# Patient Record
Sex: FEMALE | Race: WHITE | NOT HISPANIC OR LATINO | Employment: OTHER | URBAN - METROPOLITAN AREA
[De-identification: names, ages, dates, MRNs, and addresses within clinical notes are randomized per-mention and may not be internally consistent; named-entity substitution may affect disease eponyms.]

---

## 2017-01-01 ENCOUNTER — HOSPITAL ENCOUNTER (EMERGENCY)
Facility: HOSPITAL | Age: 57
Discharge: HOME | End: 2017-01-01
Attending: EMERGENCY MEDICINE
Payer: MEDICAID

## 2017-01-01 VITALS
DIASTOLIC BLOOD PRESSURE: 88 MMHG | BODY MASS INDEX: 38.32 KG/M2 | HEART RATE: 97 BPM | RESPIRATION RATE: 20 BRPM | HEIGHT: 65 IN | TEMPERATURE: 98 F | WEIGHT: 230 LBS | SYSTOLIC BLOOD PRESSURE: 156 MMHG | OXYGEN SATURATION: 94 %

## 2017-01-01 DIAGNOSIS — F41.9 ANXIETY: Primary | ICD-10-CM

## 2017-01-01 PROCEDURE — 96372 THER/PROPH/DIAG INJ SC/IM: CPT

## 2017-01-01 PROCEDURE — 99283 EMERGENCY DEPT VISIT LOW MDM: CPT | Mod: 25

## 2017-01-01 PROCEDURE — 63600175 PHARM REV CODE 636 W HCPCS: Performed by: EMERGENCY MEDICINE

## 2017-01-01 RX ORDER — LORAZEPAM 2 MG/ML
2 INJECTION INTRAMUSCULAR
Status: COMPLETED | OUTPATIENT
Start: 2017-01-01 | End: 2017-01-01

## 2017-01-01 RX ADMIN — LORAZEPAM 2 MG: 2 INJECTION, SOLUTION INTRAMUSCULAR; INTRAVENOUS at 03:01

## 2017-01-01 NOTE — ED AVS SNAPSHOT
[facility]  [address] [facility] [id number]               Esperanza Aquino   2017  3:00 PM   ED    Description:  Female : 1960   Department:  [facility]            Your Care was Coordinated By:     Provider Role From To    Steve Solis MD Attending Provider 17 [phone] --      Reason for Visit     Anxiety           Diagnoses this Visit        Comments    Anxiety    -  Primary       ED Disposition     ED Disposition Condition Comment    Discharge             To Do List           Follow-up Information     Follow up with PCP In 2 days.    Contact information:    [phone]      [facility]     [facility] -  Assistance  Registered nurses in the [facility] provide clinical advisement, health education, appointment booking, and other advisory services.  Call for this free service at [phone].             Medications           Message regarding Medications     Verify the changes and/or additions to your medication regime listed below are the same as discussed with your clinician today.  If any of these changes or additions are incorrect, please notify your healthcare provider.        These medications were administered today        Dose Freq    lorazepam injection 2 mg 2 mg ED 1 Time    Sig: Inject 1 mL (2 mg total) into the muscle ED 1 Time.    Class: Normal    Route: Intramuscular           Verify that the below list of medications is an accurate representation of the medications you are currently taking.  If none reported, the list may be blank. If incorrect, please contact your healthcare provider. Carry this list with you in case of emergency.           Current Medications     ferrous gluconate (FERGON) 324 MG tablet Take 1 tablet (324 mg total) by mouth 2 (two) times daily with meals.    lorazepam (ATIVAN) 1 MG tablet Take 1 mg by mouth every 6 (six) hours as needed for Anxiety.    lorazepam  "injection 2 mg Inject 1 mL (2 mg total) into the muscle ED 1 Time.    lurasidone (LATUDA) 40 mg Tab tablet Take 80 mg by mouth once daily.    pantoprazole (PROTONIX) 40 MG tablet Take 1 tablet (40 mg total) by mouth 2 (two) times daily. BID for 14 days through June 21, 2015 then once a day.    valproic acid (DEPAKENE) 250 mg capsule Take 250 mg by mouth 4 (four) times daily.           Clinical Reference Information           Your Vitals Were     BP Pulse Temp Resp Height Weight    156/88 (BP Location: Right arm, Patient Position: Sitting) 97 98.2 °F (36.8 °C) (Oral) 20 5' 5" (1.651 m) 104.3 kg (230 lb)    SpO2 BMI             94% 38.27 kg/m2         Allergies as of 1/1/2017        Reactions    Lithobid [Lithium Carbonate] Other (See Comments)    Severe depression    Sudafed [Pseudoephedrine Hcl] Other (See Comments)    Pt states she flat lined    Prednisone Other (See Comments)    Elevated BP    Prozac [Fluoxetine] Swelling    Thorazine [Chlorpromazine] Swelling      Immunizations Administered on Date of Encounter - 1/1/2017     None      ED Micro, Lab, POCT     None      ED Imaging Orders     None        Discharge Instructions         Understanding Anxiety Disorders  Almost everyone gets nervous now and then. Its normal to have knots in your stomach before a test, or for your heart to race on a first date. But an anxiety disorder is much more than a case of nerves. In fact, its symptoms may be overwhelming. But treatment can relieve many of these symptoms. Talking to your doctor is the first step.    What are anxiety disorders?  An anxiety disorder causes intense feelings of panic and fear. These feelings may arise for no apparent reason. And they tend to recur again and again. They may prevent you from coping with life and cause you great distress. As a result, you may avoid anything that triggers your fear. In extreme cases, you may never leave the house. Anxiety disorders may cause other symptoms, such " as:  · Obsessive thoughts you cant control  · Constant nightmares or painful thoughts of the past  · Nausea, sweating, and muscle tension  · Difficulty sleeping or concentrating  What causes anxiety disorders?  Anxiety disorders tend to run in families. For some people, childhood abuse or neglect may play a role. For others, stressful life events or trauma may trigger anxiety disorders. Anxiety can trigger low self-esteem and poor coping skills.  Common anxiety disorders  · Panic disorder: This causes an intense fear of being in danger.  · Phobias: These are extreme fears of certain objects, places, or events.  · Obsessive-compulsive disorder: This causes you to have unwanted thoughts. You also may perform certain actions over and over.  · Posttraumatic stress disorder: This occurs in people who have survived a terrible ordeal. It can cause nightmares and flashbacks about the event.  · Generalized anxiety disorder: This causes constant worry that can greatly disrupt your life.   Getting better  You may believe that nothing can help you. Or, you might fear what others may think. But most anxiety symptoms can be eased. Having an anxiety disorder is nothing to be ashamed of. Most people do best with treatment that combines medication and therapy. Although these arent cures, they can help you live a healthier life.  © 5419-6451 [employer]. [address] [place] [address], [place] [address]. All rights reserved. This information is not intended as a substitute for professional medical care. Always follow your healthcare professional's instructions.          [facility] Sign-Up     Activating your [facility] account is as easy as 1-2-3!     1) Visit [email or web address], select Sign Up Now, enter this activation code and your date of birth, then select Next.  [email or web address]  Expires: 2/15/2017  3:05 PM      2) Create a username and password to use when you visit [facility] in the future and select a security question in  case you lose your password and select Next.    3) Enter your e-mail address and click Sign Up!    Additional Information  If you have questions, please e-mail [email or web address] or call [phone] to talk to our [facility] staff. Remember, [facility] is NOT to be used for urgent needs. For medical emergencies, dial 911.         Smoking Cessation     If you would like to quit smoking:   You may be eligible for free services if you are a [place] resident and started smoking cigarettes before September 1, 1988.  Call the Smoking Cessation Trust (SCT) toll free at (376) 216-0251 or (481) 821-2012.   Call 0-907-QUIT-NOW if you do not meet the above criteria.             [facility] complies with applicable Federal civil rights laws and does not discriminate on the basis of race, color, national origin, age, disability, or sex.        Language Assistance Services     ATTENTION: Language assistance services are available, free of charge. Please call [phone].      ATENCIÓN: Si habla español, tiene a bobo disposición servicios gratuitos de asistencia lingüística. Llame al [phone].     CHÚ Ý: N?u b?n nói Ti?ng Vi?t, có các d?ch v? h? tr? ngôn ng? mi?n phí dành cho b?n. G?i s? [phone].

## 2017-01-01 NOTE — DISCHARGE INSTRUCTIONS
Understanding Anxiety Disorders  Almost everyone gets nervous now and then. Its normal to have knots in your stomach before a test, or for your heart to race on a first date. But an anxiety disorder is much more than a case of nerves. In fact, its symptoms may be overwhelming. But treatment can relieve many of these symptoms. Talking to your doctor is the first step.    What are anxiety disorders?  An anxiety disorder causes intense feelings of panic and fear. These feelings may arise for no apparent reason. And they tend to recur again and again. They may prevent you from coping with life and cause you great distress. As a result, you may avoid anything that triggers your fear. In extreme cases, you may never leave the house. Anxiety disorders may cause other symptoms, such as:  · Obsessive thoughts you cant control  · Constant nightmares or painful thoughts of the past  · Nausea, sweating, and muscle tension  · Difficulty sleeping or concentrating  What causes anxiety disorders?  Anxiety disorders tend to run in families. For some people, childhood abuse or neglect may play a role. For others, stressful life events or trauma may trigger anxiety disorders. Anxiety can trigger low self-esteem and poor coping skills.  Common anxiety disorders  · Panic disorder: This causes an intense fear of being in danger.  · Phobias: These are extreme fears of certain objects, places, or events.  · Obsessive-compulsive disorder: This causes you to have unwanted thoughts. You also may perform certain actions over and over.  · Posttraumatic stress disorder: This occurs in people who have survived a terrible ordeal. It can cause nightmares and flashbacks about the event.  · Generalized anxiety disorder: This causes constant worry that can greatly disrupt your life.   Getting better  You may believe that nothing can help you. Or, you might fear what others may think. But most anxiety symptoms can be eased. Having an anxiety  disorder is nothing to be ashamed of. Most people do best with treatment that combines medication and therapy. Although these arent cures, they can help you live a healthier life.  © 4813-3899 [employer]. [address] [place] [address], [place] [address]. All rights reserved. This information is not intended as a substitute for professional medical care. Always follow your healthcare professional's instructions.

## 2017-01-01 NOTE — ED PROVIDER NOTES
"SCRIBE #1 NOTE: I, Manjula Paul, am scribing for, and in the presence of, Steve Solis MD. I have scribed the entire note.      History      Chief Complaint   Patient presents with    Anxiety     Pt states, "I am having bad anxiety".       Review of patient's allergies indicates:   Allergen Reactions    Lithobid [lithium carbonate] Other (See Comments)     Severe depression    Sudafed [pseudoephedrine hcl] Other (See Comments)     Pt states she flat lined    Prednisone Other (See Comments)     Elevated BP    Prozac [fluoxetine] Swelling    Thorazine [chlorpromazine] Swelling        HPI   HPI    2017, 3:00 PM   History obtained from the patient      History of Present Illness: Esperanza Aquino is a 56 y.o. female patient with PMHx of anxiety who presents to the Emergency Department for anxiety exacerbation which onset gradually 2 days ago. Pt reports that she is prescribed Ativan 1 mg for anxiety but that it is not helping. She reports that she cannot stop crying and talking when she is nervous and requests something else for anxiety. Symptoms are constant and mild in severity. No modifying factors reported. Patient is currently crying. Patient denies SI, HI, self-injury, CP, SOB, HA, and all other sxs at this time. No further complaints or concerns at this time.       Arrival mode: Personal vehicle    PCP: Primary Doctor No       Past Medical History:  Past Medical History   Diagnosis Date    Bipolar 1 disorder      not sure what type    Cancer      Uterine Cancer    COPD (chronic obstructive pulmonary disease)     Depression     Encounter for blood transfusion     Hypertension     Personal history of peptic ulcer disease        Past Surgical History:  Past Surgical History   Procedure Laterality Date    Cholecystectomy      Hernia repair       section      Hysterectomy      Appendectomy           Family History:  Family History   Problem Relation Age of Onset    Lung cancer Father "     Brain cancer Brother        Social History:  Social History     Social History Main Topics    Smoking status: Former Smoker     Packs/day: 1.00     Years: 25.00     Types: Cigarettes     Quit date: 1/7/2015    Smokeless tobacco: Never Used      Comment: Patient now using E-Cigarrettes    Alcohol use No    Drug use: No    Sexual activity: Not Currently       ROS   Review of Systems   Constitutional: Negative for fever.   HENT: Negative for sore throat.    Respiratory: Negative for shortness of breath.    Cardiovascular: Negative for chest pain.   Gastrointestinal: Negative for nausea.   Genitourinary: Negative for dysuria.   Musculoskeletal: Negative for back pain.   Skin: Negative for rash.   Neurological: Negative for weakness and headaches.   Hematological: Does not bruise/bleed easily.   Psychiatric/Behavioral: Negative for self-injury and suicidal ideas. The patient is nervous/anxious.         (-) HI   All other systems reviewed and are negative.      Physical Exam    Initial Vitals   BP Pulse Resp Temp SpO2   01/01/17 1458 01/01/17 1458 01/01/17 1458 01/01/17 1458 01/01/17 1458   156/88 97 20 98.2 °F (36.8 °C) 94 %      Physical Exam  Nursing Notes and Vital Signs Reviewed.  Constitutional: Patient is in no acute distress. Awake and alert. Well-developed and well-nourished.  Head: Atraumatic. Normocephalic.  Eyes: PERRL. EOM intact. Conjunctivae are not pale. No scleral icterus.  ENT: Mucous membranes are moist. Oropharynx is clear and symmetric.    Neck: Supple. Full ROM. .  Cardiovascular: Regular rate. Regular rhythm. No murmurs, rubs, or gallops. Distal pulses are 2+ and symmetric.  Pulmonary/Chest: No respiratory distress. Clear to auscultation bilaterally. No wheezing, rales, or rhonchi.  Abdominal: Soft and non-distended.  There is no tenderness.    Genitourinary: No CVA tenderness  Musculoskeletal: Moves all extremities. No obvious deformities. No edema.   Skin: Warm and dry.  Neurological:   "Alert, awake, and appropriate.  Normal speech.  No acute focal neurological deficits are appreciated.  Psychiatric: Anxious affect with pressure speech. Denies HI and SI. Good eye contact.     ED Course    Procedures  ED Vital Signs:  Vitals:    01/01/17 1458   BP: (!) 156/88   Pulse: 97   Resp: 20   Temp: 98.2 °F (36.8 °C)   TempSrc: Oral   SpO2: (!) 94%   Weight: 104.3 kg (230 lb)   Height: 5' 5" (1.651 m)     The Emergency Provider reviewed the vital signs and test results, which are outlined above.    ED Discussion     3:00 PM: Reassessed pt at this time. Discussed pt dx and plan of tx. Informed pt to follow up with PCP. All questions and concerns were addressed at this time. Pt expresses understanding of information and instructions, and is comfortable with plan to discharge. Pt is stable for discharge.    I discussed with patient that evaluation in the ED does not suggest any emergent or life threatening medical conditions requiring immediate intervention beyond what was provided in the ED, and I believe patient is safe for discharge.  Regardless, an unremarkable evaluation in the ED does not preclude the development or presence of a serious of life threatening condition. As such, patient was instructed to return immediately for any worsening or change in current symptoms.    Pre-hypertension/Hypertension: The pt has been informed that they may have pre-hypertension or hypertension based on a blood pressure reading in the ED. I recommend that the pt call the PCP listed on their discharge instructions or a physician of their choice this week to arrange f/u for further evaluation of possible pre-hypertension or hypertension.     ED Medication(s):  Medications   lorazepam injection 2 mg (2 mg Intramuscular Given 1/1/17 7952)       Discharge Medication List as of 1/1/2017  3:05 PM          Follow-up Information     Follow up with PCP In 2 days.    Contact information:    [phone]           Medical Decision Making  "             Scribe Attestation:   Scribe #1: I performed the above scribed service and the documentation accurately describes the services I performed. I attest to the accuracy of the note.    Attending:   Physician Attestation Statement for Scribe #1: I, Steve Solis MD, personally performed the services described in this documentation, as scribed by Manjula Miller, in my presence, and it is both accurate and complete.          Clinical Impression       ICD-10-CM ICD-9-CM   1. Anxiety F41.9 300.00       Disposition:   Disposition: Discharged  Condition: Stable         Steve Solis MD  01/01/17 153

## 2017-05-30 ENCOUNTER — HOSPITAL ENCOUNTER (EMERGENCY)
Facility: HOSPITAL | Age: 57
Discharge: HOME | End: 2017-05-30
Attending: EMERGENCY MEDICINE
Payer: MEDICAID

## 2017-05-30 VITALS
HEART RATE: 91 BPM | DIASTOLIC BLOOD PRESSURE: 72 MMHG | OXYGEN SATURATION: 94 % | RESPIRATION RATE: 18 BRPM | SYSTOLIC BLOOD PRESSURE: 154 MMHG | BODY MASS INDEX: 39.82 KG/M2 | TEMPERATURE: 98 F | HEIGHT: 65 IN | WEIGHT: 239 LBS

## 2017-05-30 DIAGNOSIS — M47.817 DJD (DEGENERATIVE JOINT DISEASE), LUMBOSACRAL: ICD-10-CM

## 2017-05-30 DIAGNOSIS — S39.012A LUMBAR STRAIN, INITIAL ENCOUNTER: Primary | ICD-10-CM

## 2017-05-30 PROCEDURE — 25000003 PHARM REV CODE 250: Performed by: EMERGENCY MEDICINE

## 2017-05-30 PROCEDURE — 99283 EMERGENCY DEPT VISIT LOW MDM: CPT

## 2017-05-30 RX ORDER — TRAMADOL HYDROCHLORIDE 50 MG/1
50 TABLET ORAL EVERY 6 HOURS PRN
Qty: 15 TABLET | Refills: 0 | Status: SHIPPED | OUTPATIENT
Start: 2017-05-30 | End: 2017-06-09

## 2017-05-30 RX ORDER — CYCLOBENZAPRINE HCL 5 MG
5 TABLET ORAL 3 TIMES DAILY PRN
Qty: 20 TABLET | Refills: 0 | Status: SHIPPED | OUTPATIENT
Start: 2017-05-30 | End: 2017-06-04

## 2017-05-30 RX ORDER — DIAZEPAM 5 MG/1
5 TABLET ORAL
Status: COMPLETED | OUTPATIENT
Start: 2017-05-30 | End: 2017-05-30

## 2017-05-30 RX ORDER — HYDROCODONE BITARTRATE AND ACETAMINOPHEN 5; 325 MG/1; MG/1
1 TABLET ORAL
Status: COMPLETED | OUTPATIENT
Start: 2017-05-30 | End: 2017-05-30

## 2017-05-30 RX ADMIN — DIAZEPAM 5 MG: 5 TABLET ORAL at 06:05

## 2017-05-30 RX ADMIN — HYDROCODONE BITARTRATE AND ACETAMINOPHEN 1 TABLET: 5; 325 TABLET ORAL at 06:05

## 2017-05-30 NOTE — ED PROVIDER NOTES
SCRIBE #1 NOTE: I, Arelydenice Sarabia, am scribing for, and in the presence of, John Dobson MD. I have scribed the entire note.      History      Chief Complaint   Patient presents with    Back Pain     injured back friday at work       Review of patient's allergies indicates:   Allergen Reactions    Lithobid [lithium carbonate] Other (See Comments)     Severe depression    Sudafed [pseudoephedrine hcl] Other (See Comments)     Pt states she flat lined    Prednisone Other (See Comments)     Elevated BP    Prozac [fluoxetine] Swelling    Thorazine [chlorpromazine] Swelling        HPI   HPI    2017, 5:45 PM   History obtained from the patient      History of Present Illness: Esperanza Aquino is a 56 y.o. female patient who presents to the Emergency Department for lower back pain which onset suddenly today after injuring her back at work when she sat on something that hit her back .  Symptoms are constant and moderate in severity. No mitigating or exacerbating factors reported. No associated sxs reported. Patient denies any abd pain, CP, SOB, fever, chills, n/v/d, HA, LOC, head injury/trauma, neck pain, knee pain, hip pain, and all other sxs at this time. Prior Tx includes nothing. No further complaints or concerns at this time.         Arrival mode: Personal vehicle     PCP: Primary Doctor No       Past Medical History:  Past Medical History:   Diagnosis Date    Bipolar 1 disorder     not sure what type    Cancer     Uterine Cancer    COPD (chronic obstructive pulmonary disease)     Depression     Encounter for blood transfusion     Hypertension     Personal history of peptic ulcer disease        Past Surgical History:  Past Surgical History:   Procedure Laterality Date    APPENDECTOMY       SECTION      CHOLECYSTECTOMY      HERNIA REPAIR      HYSTERECTOMY           Family History:  Family History   Problem Relation Age of Onset    Lung cancer Father     Brain cancer Brother        Social  History:  Social History     Social History Main Topics    Smoking status: Former Smoker     Packs/day: 1.00     Years: 25.00     Types: Cigarettes     Quit date: 1/7/2015    Smokeless tobacco: Never Used      Comment: Patient now using E-Cigarrettes    Alcohol use No    Drug use: No    Sexual activity: Not Currently       ROS   Review of Systems   Constitutional: Negative for chills and fever.   HENT: Negative for sore throat.    Respiratory: Negative for shortness of breath.    Cardiovascular: Negative for chest pain.   Gastrointestinal: Negative for diarrhea, nausea and vomiting.   Genitourinary: Negative for dysuria.   Musculoskeletal: Positive for back pain (lower). Negative for neck pain.        (-) hip pain  (-) knee pain     Skin: Negative for rash.   Neurological: Negative for dizziness, syncope, weakness and headaches.   Hematological: Does not bruise/bleed easily.   All other systems reviewed and are negative.      Physical Exam      Initial Vitals [05/30/17 1601]   BP Pulse Resp Temp SpO2   (!) 169/88 96 18 97.5 °F (36.4 °C) (!) 93 %      Physical Exam  Nursing Notes and Vital Signs Reviewed.  Constitutional: Patient is in no apparent distress. Well-developed and well-nourished.  Head: Atraumatic. Normocephalic.  Eyes: PERRL. EOM intact. Conjunctivae are not pale. No scleral icterus.  ENT: Mucous membranes are moist. Oropharynx is clear and symmetric.    Neck: Supple. Full ROM. No lymphadenopathy.  Cardiovascular: Regular rate. Regular rhythm. No murmurs, rubs, or gallops. Distal pulses are 2+ and symmetric.  Pulmonary/Chest: No respiratory distress. Clear to auscultation bilaterally. No wheezing, rales, or rhonchi.  Abdominal: Soft and non-distended.  There is no tenderness.  No rebound, guarding, or rigidity. Good bowel sounds.  Musculoskeletal: Moves all extremities. No obvious deformities. No edema. No calf tenderness.  Neck: Supple.  No cervical midline bony tenderness, deformities, or  "step-offs.   Back: Tenderness to lower back with extrnsion of knee on R and against resistance. Slightly decreased ROM of lumbar spine. Skin appears normal without abrasions or bruising. No erythema, induration, or fluctuance.   Skin: Warm and dry.  Neurological:  Alert, awake, and appropriate.  Normal speech.  No acute focal neurological deficits are appreciated.  Psychiatric: Normal affect. Good eye contact. Appropriate in content.          ED Course    Procedures  ED Vital Signs:  Vitals:    05/30/17 1601   BP: (!) 169/88   Pulse: 96   Resp: 18   Temp: 97.5 °F (36.4 °C)   TempSrc: Oral   SpO2: (!) 93%   Weight: 108.4 kg (239 lb)   Height: 5' 5" (1.651 m)       Abnormal Lab Results:  Labs Reviewed - No data to display     All Lab Results:      Imaging Results:  Imaging Results          X-Ray Lumbar Spine Ap And Lateral (Final result)  Result time 05/30/17 19:18:30    Final result by Jet Solorio MD (05/30/17 19:18:30)                 Impression:         No acute fracture or subluxation.  Multilevel spondylosis.       Electronically signed by: JET SOLORIO MD  Date:     05/30/17  Time:    19:18              Narrative:    Exam: XR LUMBAR SPINE AP AND LATERAL    Clinical History:   Acute low back pain.  Initial encounter.      Findings:      The vertebral body heights and alignment are within normal limits. No acute fracture or subluxation.Moderate degenerative disc disease at L1-L2 and L4-L5. Moderate bilateral facet DJD L4-L5 L5-S1.                                      The Emergency Provider reviewed the vital signs and test results, which are outlined above.    ED Discussion      7:15 PM patient's pain is improved after medications.  X-rays do not reveal any evidence of an acute fracture.  There is moderate degenerative disc disease at L1-L2 and L4-L5.  There is also moderate bilateral facet degenerative joint disease L4-5 L5 S1    ED Medication(s):  Medications   diazePAM tablet 5 mg (5 mg Oral Given 5/30/17 " 1808)   hydrocodone-acetaminophen 5-325mg per tablet 1 tablet (1 tablet Oral Given 5/30/17 1808)       New Prescriptions    CYCLOBENZAPRINE (FLEXERIL) 5 MG TABLET    Take 1 tablet (5 mg total) by mouth 3 (three) times daily as needed for Muscle spasms.    TRAMADOL (ULTRAM) 50 MG TABLET    Take 1 tablet (50 mg total) by mouth every 6 (six) hours as needed for Pain.             Medical Decision Making              Scribe Attestation:   Scribe #1: I performed the above scribed service and the documentation accurately describes the services I performed. I attest to the accuracy of the note.    Attending:   Physician Attestation Statement for Scribe #1: I, John Dobson MD, personally performed the services described in this documentation, as scribed by Arely Sarabia, in my presence, and it is both accurate and complete.          Clinical Impression       ICD-10-CM ICD-9-CM   1. Lumbar strain, initial encounter S39.012A 847.2   2. DJD (degenerative joint disease), lumbosacral M51.37 722.52       Disposition:   Disposition: Discharged  Condition: Stable         John Dobson MD  05/30/17 1934

## 2017-11-13 ENCOUNTER — HOSPITAL ENCOUNTER (EMERGENCY)
Facility: HOSPITAL | Age: 57
Discharge: HOME | End: 2017-11-13
Payer: MEDICAID

## 2017-11-13 VITALS
OXYGEN SATURATION: 95 % | SYSTOLIC BLOOD PRESSURE: 169 MMHG | TEMPERATURE: 98 F | WEIGHT: 243 LBS | HEART RATE: 84 BPM | DIASTOLIC BLOOD PRESSURE: 75 MMHG | HEIGHT: 65 IN | BODY MASS INDEX: 40.48 KG/M2 | RESPIRATION RATE: 20 BRPM

## 2017-11-13 DIAGNOSIS — M25.569 KNEE PAIN: Primary | ICD-10-CM

## 2017-11-13 DIAGNOSIS — G89.29 OTHER CHRONIC PAIN: ICD-10-CM

## 2017-11-13 PROCEDURE — 25000003 PHARM REV CODE 250: Performed by: REGISTERED NURSE

## 2017-11-13 PROCEDURE — 99283 EMERGENCY DEPT VISIT LOW MDM: CPT

## 2017-11-13 PROCEDURE — 29505 APPLICATION LONG LEG SPLINT: CPT | Mod: LT

## 2017-11-13 RX ORDER — OXYCODONE AND ACETAMINOPHEN 10; 325 MG/1; MG/1
1 TABLET ORAL
Status: COMPLETED | OUTPATIENT
Start: 2017-11-13 | End: 2017-11-13

## 2017-11-13 RX ADMIN — OXYCODONE HYDROCHLORIDE AND ACETAMINOPHEN 1 TABLET: 10; 325 TABLET ORAL at 12:11

## 2017-11-13 NOTE — ED PROVIDER NOTES
Encounter Date: 2017       History     Chief Complaint   Patient presents with    Knee Pain     pt states her left knee pain     Pt presents to ED with L knee pain which onset gradually 2-3 days ago after getting out of a chair. Pt has a hx of arthritis and bone spurs to both knees. Pt states that she was getting injections in both knees but hasn't had one since . Pt states that she needs something for the pain. Symptoms are constant and moderate in severity. No mitigating or exacerbating factors reported. Associated sxs include knee pain and difficulty walking. Patient denies any fever, CP or SOB, and all other sxs at this time. Prior Tx includes tylenol 3 with minimal effect. No further complaints or concerns at this time.             Review of patient's allergies indicates:   Allergen Reactions    Lithobid [lithium carbonate] Other (See Comments)     Severe depression    Sudafed [pseudoephedrine hcl] Other (See Comments)     Pt states she flat lined    Prednisone Other (See Comments)     Elevated BP    Prozac [fluoxetine] Swelling    Thorazine [chlorpromazine] Swelling     Past Medical History:   Diagnosis Date    Bipolar 1 disorder     not sure what type    Cancer     Uterine Cancer    COPD (chronic obstructive pulmonary disease)     Depression     Encounter for blood transfusion     Hypertension     Personal history of peptic ulcer disease      Past Surgical History:   Procedure Laterality Date    APPENDECTOMY       SECTION      CHOLECYSTECTOMY      HERNIA REPAIR      HYSTERECTOMY       Family History   Problem Relation Age of Onset    Lung cancer Father     Brain cancer Brother      Social History   Substance Use Topics    Smoking status: Former Smoker     Packs/day: 1.00     Years: 25.00     Types: Cigarettes     Quit date: 2015    Smokeless tobacco: Never Used      Comment: Patient now using E-Cigarrettes    Alcohol use No     Review of Systems   Constitutional:  Negative for fever.   HENT: Negative for sore throat.    Respiratory: Negative for shortness of breath.    Cardiovascular: Negative for chest pain.   Gastrointestinal: Negative for nausea.   Genitourinary: Negative for dysuria.   Musculoskeletal: Positive for arthralgias and gait problem. Negative for back pain.   Skin: Negative for rash.   Neurological: Negative for weakness.   Hematological: Does not bruise/bleed easily.   All other systems reviewed and are negative.      Physical Exam     Initial Vitals [11/13/17 1206]   BP Pulse Resp Temp SpO2   (!) 170/84 98 20 98.6 °F (37 °C) (!) 93 %      MAP       112.67         Physical Exam  Vital signs and nursing notes reviewed.  Constitutional: Patient is in no acute distress. Awake and alert. Well-developed and well-nourished.  Head: Atraumatic. Normocephalic.  Eyes: PERRL. EOM intact. Conjunctivae nl. No scleral icterus.  ENT: Mucous membranes are moist. Oropharynx is clear.  Neck: Supple. Full ROM. No lymphadenopathy.  Cardiovascular: Regular rate and rhythm. No murmurs, rubs, or gallops. Distal pulses are 2+ and symmetric.  Pulmonary/Chest: No respiratory distress. Clear to auscultation bilaterally. No wheezing, rales, or rhonchi.  Abdominal: Soft. Non-distended. No TTP. No rebound, guarding, or rigidity. Good bowel sounds.  Genitourinary: No CVA tenderness  Musculoskeletal: Moves all extremities. No edema. No calf tenderness.  LEFT Knee:  No obvious deformity. There is mild swelling to lateral aspect.  There is point tenderness to lateral and posterior knee.  No increased warmth, erythema, induration or fluctuance.  No ligament laxity. DP and PT pulses are 2+.  Normal capillary refill.  Distal sensation is intact.  Skin: Warm and dry.  Neurological: Awake and alert. No acute focal neurological deficits are appreciated.  Psychiatric: Normal affect. Good eye contact. Appropriate in content.    ED Course   Procedures  Labs Reviewed - No data to display            Imaging Results          X-Ray Knee Complete 4 or more Views Left (Final result)  Result time 11/13/17 12:45:19   Procedure changed from X-Ray Knee 3 View Left     Final result by Bryce Lopez MD (11/13/17 12:45:19)                 Impression:         No acute fracture of the left knee.        Electronically signed by: BRYCE LOPEZ MD  Date:     11/13/17  Time:    12:45              Narrative:    Exam: XR KNEE COMP 4 OR MORE VIEWS LEFT    Clinical History:    Pain.  Left knee injury.    Findings:      Small osteophytes in the medial and lateral compartment.No fracture or dislocation or joint effusion.                                                ED Course    1:20 PM: Reassessed pt at this time.  Pt states her condition has improved at this time. Discussed with pt all pertinent ED information and results. Discussed pt dx of knee pain and plan of tx. Gave pt all f/u and return to the ED instructions. All questions and concerns were addressed at this time. Pt expresses understanding of information and instructions, and is comfortable with plan to discharge. Pt is stable for discharge.      Clinical Impression:   The primary encounter diagnosis was Knee pain. A diagnosis of Other chronic pain was also pertinent to this visit.                           Randy Salmeron Jr., [facility]  11/13/17 1532       Randy Salmeron Jr., [facility]  11/13/17 1532

## 2018-01-24 ENCOUNTER — HOSPITAL ENCOUNTER (INPATIENT)
Facility: HOSPITAL | Age: 58
LOS: 3 days | Discharge: HOME HEALTH | DRG: 871 | End: 2018-01-27
Attending: EMERGENCY MEDICINE | Admitting: INTERNAL MEDICINE
Payer: MEDICAID

## 2018-01-24 DIAGNOSIS — E87.6 HYPOKALEMIA: ICD-10-CM

## 2018-01-24 DIAGNOSIS — J18.9 PNEUMONIA DUE TO INFECTIOUS ORGANISM, UNSPECIFIED LATERALITY, UNSPECIFIED PART OF LUNG: ICD-10-CM

## 2018-01-24 DIAGNOSIS — J18.9 PNEUMONIA OF RIGHT LOWER LOBE DUE TO INFECTIOUS ORGANISM: Primary | ICD-10-CM

## 2018-01-24 DIAGNOSIS — J96.01 ACUTE RESPIRATORY FAILURE WITH HYPOXIA: ICD-10-CM

## 2018-01-24 DIAGNOSIS — R07.9 CHEST PAIN: ICD-10-CM

## 2018-01-24 DIAGNOSIS — J44.0 CHRONIC OBSTRUCTIVE PULMONARY DISEASE WITH ACUTE LOWER RESPIRATORY INFECTION: ICD-10-CM

## 2018-01-24 DIAGNOSIS — J44.1 CHRONIC OBSTRUCTIVE PULMONARY DISEASE WITH ACUTE EXACERBATION: ICD-10-CM

## 2018-01-24 PROBLEM — E78.5 HYPERLIPIDEMIA: Status: ACTIVE | Noted: 2018-01-24

## 2018-01-24 PROBLEM — I10 ESSENTIAL HYPERTENSION: Status: ACTIVE | Noted: 2018-01-24

## 2018-01-24 PROBLEM — F31.61 BIPOLAR DISORDER, CURRENT EPISODE MIXED, MILD: Status: ACTIVE | Noted: 2018-01-24

## 2018-01-24 PROBLEM — E03.9 HYPOTHYROID: Status: ACTIVE | Noted: 2018-01-24

## 2018-01-24 PROBLEM — Z72.0 TOBACCO ABUSE: Status: ACTIVE | Noted: 2018-01-24

## 2018-01-24 PROBLEM — J96.21 ACUTE ON CHRONIC RESPIRATORY FAILURE WITH HYPOXIA: Status: ACTIVE | Noted: 2018-01-24

## 2018-01-24 LAB
ALBUMIN SERPL BCP-MCNC: 3.4 G/DL
ALLENS TEST: ABNORMAL
ALP SERPL-CCNC: 69 U/L
ALT SERPL W/O P-5'-P-CCNC: 9 U/L
ANION GAP SERPL CALC-SCNC: 15 MMOL/L
AST SERPL-CCNC: 12 U/L
BASOPHILS # BLD AUTO: 0.03 K/UL
BASOPHILS NFR BLD: 0.3 %
BILIRUB SERPL-MCNC: 0.3 MG/DL
BNP SERPL-MCNC: 41 PG/ML
BUN SERPL-MCNC: 8 MG/DL
CALCIUM SERPL-MCNC: 9.5 MG/DL
CHLORIDE SERPL-SCNC: 90 MMOL/L
CO2 SERPL-SCNC: 32 MMOL/L
CREAT SERPL-MCNC: 0.8 MG/DL
DELSYS: ABNORMAL
DIFFERENTIAL METHOD: ABNORMAL
EOSINOPHIL # BLD AUTO: 0.1 K/UL
EOSINOPHIL NFR BLD: 1 %
ERYTHROCYTE [DISTWIDTH] IN BLOOD BY AUTOMATED COUNT: 14.2 %
EST. GFR  (AFRICAN AMERICAN): >60 ML/MIN/1.73 M^2
EST. GFR  (NON AFRICAN AMERICAN): >60 ML/MIN/1.73 M^2
GLUCOSE SERPL-MCNC: 162 MG/DL
HCO3 UR-SCNC: 33.8 MMOL/L (ref 24–28)
HCT VFR BLD AUTO: 44.5 %
HGB BLD-MCNC: 14.9 G/DL
LACTATE SERPL-SCNC: 2.9 MMOL/L
LYMPHOCYTES # BLD AUTO: 3.3 K/UL
LYMPHOCYTES NFR BLD: 31.4 %
MAGNESIUM SERPL-MCNC: 1.4 MG/DL
MCH RBC QN AUTO: 30.4 PG
MCHC RBC AUTO-ENTMCNC: 33.5 G/DL
MCV RBC AUTO: 91 FL
MONOCYTES # BLD AUTO: 0.6 K/UL
MONOCYTES NFR BLD: 5.8 %
NEUTROPHILS # BLD AUTO: 6.4 K/UL
NEUTROPHILS NFR BLD: 61.5 %
PCO2 BLDA: 48.5 MMHG (ref 35–45)
PH SMN: 7.45 [PH] (ref 7.35–7.45)
PLATELET # BLD AUTO: 361 K/UL
PMV BLD AUTO: 9 FL
PO2 BLDA: 50 MMHG (ref 80–100)
POC BE: 10 MMOL/L
POC SATURATED O2: 86 % (ref 95–100)
POTASSIUM SERPL-SCNC: 2.7 MMOL/L
PROT SERPL-MCNC: 8 G/DL
RBC # BLD AUTO: 4.9 M/UL
SAMPLE: ABNORMAL
SITE: ABNORMAL
SODIUM SERPL-SCNC: 137 MMOL/L
TROPONIN I SERPL DL<=0.01 NG/ML-MCNC: <0.006 NG/ML
WBC # BLD AUTO: 10.45 K/UL

## 2018-01-24 PROCEDURE — 96375 TX/PRO/DX INJ NEW DRUG ADDON: CPT

## 2018-01-24 PROCEDURE — 82803 BLOOD GASES ANY COMBINATION: CPT

## 2018-01-24 PROCEDURE — 36415 COLL VENOUS BLD VENIPUNCTURE: CPT

## 2018-01-24 PROCEDURE — 99285 EMERGENCY DEPT VISIT HI MDM: CPT | Mod: 25

## 2018-01-24 PROCEDURE — 99900035 HC TECH TIME PER 15 MIN (STAT)

## 2018-01-24 PROCEDURE — 93010 ELECTROCARDIOGRAM REPORT: CPT | Mod: ,,, | Performed by: INTERNAL MEDICINE

## 2018-01-24 PROCEDURE — 83605 ASSAY OF LACTIC ACID: CPT

## 2018-01-24 PROCEDURE — 63600175 PHARM REV CODE 636 W HCPCS: Performed by: EMERGENCY MEDICINE

## 2018-01-24 PROCEDURE — 25000242 PHARM REV CODE 250 ALT 637 W/ HCPCS: Performed by: EMERGENCY MEDICINE

## 2018-01-24 PROCEDURE — 21400001 HC TELEMETRY ROOM

## 2018-01-24 PROCEDURE — 84484 ASSAY OF TROPONIN QUANT: CPT

## 2018-01-24 PROCEDURE — 83735 ASSAY OF MAGNESIUM: CPT

## 2018-01-24 PROCEDURE — 93005 ELECTROCARDIOGRAM TRACING: CPT

## 2018-01-24 PROCEDURE — 94760 N-INVAS EAR/PLS OXIMETRY 1: CPT

## 2018-01-24 PROCEDURE — 83880 ASSAY OF NATRIURETIC PEPTIDE: CPT

## 2018-01-24 PROCEDURE — 85025 COMPLETE CBC W/AUTO DIFF WBC: CPT

## 2018-01-24 PROCEDURE — 94640 AIRWAY INHALATION TREATMENT: CPT

## 2018-01-24 PROCEDURE — 80053 COMPREHEN METABOLIC PANEL: CPT

## 2018-01-24 PROCEDURE — 63600175 PHARM REV CODE 636 W HCPCS: Performed by: NURSE PRACTITIONER

## 2018-01-24 PROCEDURE — 87040 BLOOD CULTURE FOR BACTERIA: CPT

## 2018-01-24 PROCEDURE — 96361 HYDRATE IV INFUSION ADD-ON: CPT

## 2018-01-24 PROCEDURE — 25000003 PHARM REV CODE 250: Performed by: EMERGENCY MEDICINE

## 2018-01-24 PROCEDURE — 25000003 PHARM REV CODE 250: Performed by: NURSE PRACTITIONER

## 2018-01-24 PROCEDURE — 36600 WITHDRAWAL OF ARTERIAL BLOOD: CPT

## 2018-01-24 PROCEDURE — 96365 THER/PROPH/DIAG IV INF INIT: CPT

## 2018-01-24 RX ORDER — GABAPENTIN 300 MG/1
300 CAPSULE ORAL 3 TIMES DAILY
COMMUNITY
End: 2019-04-02

## 2018-01-24 RX ORDER — IPRATROPIUM BROMIDE AND ALBUTEROL SULFATE 2.5; .5 MG/3ML; MG/3ML
3 SOLUTION RESPIRATORY (INHALATION)
Status: COMPLETED | OUTPATIENT
Start: 2018-01-24 | End: 2018-01-24

## 2018-01-24 RX ORDER — FLUTICASONE PROPIONATE 50 MCG
1 SPRAY, SUSPENSION (ML) NASAL DAILY
COMMUNITY
End: 2019-04-02 | Stop reason: SDUPTHER

## 2018-01-24 RX ORDER — IPRATROPIUM BROMIDE AND ALBUTEROL SULFATE 2.5; .5 MG/3ML; MG/3ML
3 SOLUTION RESPIRATORY (INHALATION) EVERY 4 HOURS
Status: DISCONTINUED | OUTPATIENT
Start: 2018-01-25 | End: 2018-01-27 | Stop reason: HOSPADM

## 2018-01-24 RX ORDER — ATORVASTATIN CALCIUM 20 MG/1
20 TABLET, FILM COATED ORAL DAILY
COMMUNITY
End: 2019-04-02 | Stop reason: SDUPTHER

## 2018-01-24 RX ORDER — LEVOTHYROXINE SODIUM 112 UG/1
112 TABLET ORAL DAILY
Status: DISCONTINUED | OUTPATIENT
Start: 2018-01-25 | End: 2018-01-27 | Stop reason: HOSPADM

## 2018-01-24 RX ORDER — LEVOTHYROXINE SODIUM 112 UG/1
112 TABLET ORAL DAILY
COMMUNITY
End: 2019-04-02 | Stop reason: SDUPTHER

## 2018-01-24 RX ORDER — BUPROPION HYDROCHLORIDE 150 MG/1
300 TABLET ORAL DAILY
Status: DISCONTINUED | OUTPATIENT
Start: 2018-01-25 | End: 2018-01-27 | Stop reason: HOSPADM

## 2018-01-24 RX ORDER — POLYETHYLENE GLYCOL 3350 17 G/17G
17 POWDER, FOR SOLUTION ORAL DAILY
Status: DISCONTINUED | OUTPATIENT
Start: 2018-01-25 | End: 2018-01-27 | Stop reason: HOSPADM

## 2018-01-24 RX ORDER — LORAZEPAM 1 MG/1
1 TABLET ORAL EVERY 6 HOURS PRN
Status: DISCONTINUED | OUTPATIENT
Start: 2018-01-24 | End: 2018-01-27 | Stop reason: HOSPADM

## 2018-01-24 RX ORDER — PROMETHAZINE HYDROCHLORIDE 25 MG/1
25 TABLET ORAL EVERY 6 HOURS PRN
Status: DISCONTINUED | OUTPATIENT
Start: 2018-01-24 | End: 2018-01-27 | Stop reason: HOSPADM

## 2018-01-24 RX ORDER — VALPROIC ACID 250 MG/1
500 CAPSULE, LIQUID FILLED ORAL 2 TIMES DAILY
Status: DISCONTINUED | OUTPATIENT
Start: 2018-01-24 | End: 2018-01-27 | Stop reason: HOSPADM

## 2018-01-24 RX ORDER — PANTOPRAZOLE SODIUM 40 MG/1
40 TABLET, DELAYED RELEASE ORAL DAILY
Status: DISCONTINUED | OUTPATIENT
Start: 2018-01-25 | End: 2018-01-27 | Stop reason: HOSPADM

## 2018-01-24 RX ORDER — MAGNESIUM SULFATE HEPTAHYDRATE 40 MG/ML
2 INJECTION, SOLUTION INTRAVENOUS
Status: COMPLETED | OUTPATIENT
Start: 2018-01-24 | End: 2018-01-25

## 2018-01-24 RX ORDER — HYDROXYZINE HYDROCHLORIDE 25 MG/1
25 TABLET, FILM COATED ORAL 4 TIMES DAILY
COMMUNITY
End: 2019-04-02 | Stop reason: SDUPTHER

## 2018-01-24 RX ORDER — PROMETHAZINE HYDROCHLORIDE 25 MG/1
25 TABLET ORAL EVERY 4 HOURS
COMMUNITY
End: 2019-04-02 | Stop reason: ALTCHOICE

## 2018-01-24 RX ORDER — POTASSIUM CHLORIDE 20 MEQ/1
40 TABLET, EXTENDED RELEASE ORAL
Status: COMPLETED | OUTPATIENT
Start: 2018-01-24 | End: 2018-01-24

## 2018-01-24 RX ORDER — CYCLOBENZAPRINE HCL 10 MG
10 TABLET ORAL 2 TIMES DAILY PRN
Status: DISCONTINUED | OUTPATIENT
Start: 2018-01-24 | End: 2018-01-27 | Stop reason: HOSPADM

## 2018-01-24 RX ORDER — OLANZAPINE 5 MG/1
20 TABLET ORAL NIGHTLY
Status: DISCONTINUED | OUTPATIENT
Start: 2018-01-24 | End: 2018-01-27 | Stop reason: HOSPADM

## 2018-01-24 RX ORDER — CYCLOBENZAPRINE HCL 10 MG
10 TABLET ORAL 2 TIMES DAILY PRN
COMMUNITY
End: 2019-04-02 | Stop reason: SDUPTHER

## 2018-01-24 RX ORDER — METOPROLOL TARTRATE 50 MG/1
50 TABLET ORAL 2 TIMES DAILY
Status: DISCONTINUED | OUTPATIENT
Start: 2018-01-24 | End: 2018-01-27 | Stop reason: HOSPADM

## 2018-01-24 RX ORDER — FLUTICASONE PROPIONATE 50 MCG
1 SPRAY, SUSPENSION (ML) NASAL DAILY
Status: DISCONTINUED | OUTPATIENT
Start: 2018-01-25 | End: 2018-01-27 | Stop reason: HOSPADM

## 2018-01-24 RX ORDER — OXYBUTYNIN CHLORIDE 5 MG/1
5 TABLET ORAL 2 TIMES DAILY
COMMUNITY
End: 2019-04-02 | Stop reason: SDUPTHER

## 2018-01-24 RX ORDER — METOPROLOL TARTRATE 50 MG/1
50 TABLET ORAL 2 TIMES DAILY
COMMUNITY
End: 2019-04-02 | Stop reason: SDUPTHER

## 2018-01-24 RX ORDER — SODIUM CHLORIDE 9 MG/ML
1000 INJECTION, SOLUTION INTRAVENOUS
Status: COMPLETED | OUTPATIENT
Start: 2018-01-24 | End: 2018-01-24

## 2018-01-24 RX ORDER — CEFTRIAXONE 1 G/1
1 INJECTION, POWDER, FOR SOLUTION INTRAMUSCULAR; INTRAVENOUS
Status: DISCONTINUED | OUTPATIENT
Start: 2018-01-25 | End: 2018-01-24

## 2018-01-24 RX ORDER — ATORVASTATIN CALCIUM 10 MG/1
20 TABLET, FILM COATED ORAL DAILY
Status: DISCONTINUED | OUTPATIENT
Start: 2018-01-25 | End: 2018-01-27 | Stop reason: HOSPADM

## 2018-01-24 RX ORDER — MOXIFLOXACIN HYDROCHLORIDE 400 MG/1
400 TABLET ORAL DAILY
Status: DISCONTINUED | OUTPATIENT
Start: 2018-01-24 | End: 2018-01-24

## 2018-01-24 RX ORDER — OMEPRAZOLE 40 MG/1
40 CAPSULE, DELAYED RELEASE ORAL DAILY
COMMUNITY
End: 2019-04-02 | Stop reason: SDUPTHER

## 2018-01-24 RX ORDER — FLUTICASONE FUROATE AND VILANTEROL 200; 25 UG/1; UG/1
1 POWDER RESPIRATORY (INHALATION) DAILY
COMMUNITY
End: 2019-04-02 | Stop reason: SDUPTHER

## 2018-01-24 RX ORDER — BUPROPION HYDROCHLORIDE 300 MG/1
300 TABLET ORAL DAILY
COMMUNITY
End: 2019-04-02 | Stop reason: SDUPTHER

## 2018-01-24 RX ORDER — METHYLPREDNISOLONE SOD SUCC 125 MG
125 VIAL (EA) INJECTION
Status: COMPLETED | OUTPATIENT
Start: 2018-01-24 | End: 2018-01-24

## 2018-01-24 RX ORDER — HYDROCHLOROTHIAZIDE 25 MG/1
25 TABLET ORAL DAILY
Status: DISCONTINUED | OUTPATIENT
Start: 2018-01-25 | End: 2018-01-27 | Stop reason: HOSPADM

## 2018-01-24 RX ORDER — ENOXAPARIN SODIUM 100 MG/ML
40 INJECTION SUBCUTANEOUS EVERY 24 HOURS
Status: DISCONTINUED | OUTPATIENT
Start: 2018-01-24 | End: 2018-01-27 | Stop reason: HOSPADM

## 2018-01-24 RX ORDER — CEFTRIAXONE 1 G/1
1 INJECTION, POWDER, FOR SOLUTION INTRAMUSCULAR; INTRAVENOUS
Status: COMPLETED | OUTPATIENT
Start: 2018-01-24 | End: 2018-01-24

## 2018-01-24 RX ORDER — MONTELUKAST SODIUM 10 MG/1
10 TABLET ORAL NIGHTLY
Status: DISCONTINUED | OUTPATIENT
Start: 2018-01-24 | End: 2018-01-27 | Stop reason: HOSPADM

## 2018-01-24 RX ORDER — GABAPENTIN 300 MG/1
300 CAPSULE ORAL 3 TIMES DAILY
Status: DISCONTINUED | OUTPATIENT
Start: 2018-01-24 | End: 2018-01-27 | Stop reason: HOSPADM

## 2018-01-24 RX ORDER — OXYBUTYNIN CHLORIDE 5 MG/1
5 TABLET ORAL 2 TIMES DAILY
Status: DISCONTINUED | OUTPATIENT
Start: 2018-01-24 | End: 2018-01-27 | Stop reason: HOSPADM

## 2018-01-24 RX ORDER — HYDROCHLOROTHIAZIDE 25 MG/1
25 TABLET ORAL DAILY
COMMUNITY
End: 2019-04-02 | Stop reason: SDUPTHER

## 2018-01-24 RX ORDER — HYDROXYZINE HYDROCHLORIDE 25 MG/1
25 TABLET, FILM COATED ORAL 4 TIMES DAILY
Status: DISCONTINUED | OUTPATIENT
Start: 2018-01-25 | End: 2018-01-25

## 2018-01-24 RX ORDER — SODIUM CHLORIDE 0.9 % (FLUSH) 0.9 %
3 SYRINGE (ML) INJECTION
Status: DISCONTINUED | OUTPATIENT
Start: 2018-01-24 | End: 2018-01-27 | Stop reason: HOSPADM

## 2018-01-24 RX ORDER — OLANZAPINE 20 MG/1
20 TABLET ORAL NIGHTLY
COMMUNITY
End: 2019-04-02 | Stop reason: SDUPTHER

## 2018-01-24 RX ORDER — MONTELUKAST SODIUM 10 MG/1
10 TABLET ORAL DAILY
COMMUNITY
End: 2018-02-01

## 2018-01-24 RX ORDER — ARFORMOTEROL TARTRATE 15 UG/2ML
15 SOLUTION RESPIRATORY (INHALATION) 2 TIMES DAILY
Status: DISCONTINUED | OUTPATIENT
Start: 2018-01-24 | End: 2018-01-27 | Stop reason: HOSPADM

## 2018-01-24 RX ADMIN — IPRATROPIUM BROMIDE AND ALBUTEROL SULFATE 3 ML: .5; 3 SOLUTION RESPIRATORY (INHALATION) at 04:01

## 2018-01-24 RX ADMIN — MAGNESIUM SULFATE IN WATER 2 G: 40 INJECTION, SOLUTION INTRAVENOUS at 10:01

## 2018-01-24 RX ADMIN — IPRATROPIUM BROMIDE AND ALBUTEROL SULFATE 3 ML: .5; 3 SOLUTION RESPIRATORY (INHALATION) at 05:01

## 2018-01-24 RX ADMIN — SODIUM CHLORIDE 1000 ML: 0.9 INJECTION, SOLUTION INTRAVENOUS at 06:01

## 2018-01-24 RX ADMIN — METHYLPREDNISOLONE SODIUM SUCCINATE 125 MG: 125 INJECTION, POWDER, FOR SOLUTION INTRAMUSCULAR; INTRAVENOUS at 06:01

## 2018-01-24 RX ADMIN — OXYBUTYNIN CHLORIDE 5 MG: 5 TABLET ORAL at 10:01

## 2018-01-24 RX ADMIN — OLANZAPINE 20 MG: 5 TABLET, FILM COATED ORAL at 10:01

## 2018-01-24 RX ADMIN — ENOXAPARIN SODIUM 40 MG: 100 INJECTION SUBCUTANEOUS at 10:01

## 2018-01-24 RX ADMIN — AZITHROMYCIN MONOHYDRATE 500 MG: 500 INJECTION, POWDER, LYOPHILIZED, FOR SOLUTION INTRAVENOUS at 08:01

## 2018-01-24 RX ADMIN — GABAPENTIN 300 MG: 300 CAPSULE ORAL at 10:01

## 2018-01-24 RX ADMIN — VALPROIC ACID 500 MG: 250 CAPSULE, LIQUID FILLED ORAL at 10:01

## 2018-01-24 RX ADMIN — METOPROLOL TARTRATE 50 MG: 50 TABLET ORAL at 10:01

## 2018-01-24 RX ADMIN — CEFTRIAXONE SODIUM 1 G: 1 INJECTION, POWDER, FOR SOLUTION INTRAMUSCULAR; INTRAVENOUS at 06:01

## 2018-01-24 RX ADMIN — MONTELUKAST SODIUM 10 MG: 10 TABLET, COATED ORAL at 10:01

## 2018-01-24 RX ADMIN — POTASSIUM CHLORIDE 40 MEQ: 1500 TABLET, EXTENDED RELEASE ORAL at 06:01

## 2018-01-24 NOTE — ED PROVIDER NOTES
SCRIBE #1 NOTE: I, Jana Puri, am scribing for, and in the presence of, Angela Lopez Do, MD. I have scribed the entire note.      History      Chief Complaint   Patient presents with    Shortness of Breath     pt reports some SOB and cough that has been gradually increasing over the past few weeks, pt reports pain on Lt side       Review of patient's allergies indicates:   Allergen Reactions    Lithobid [lithium carbonate] Other (See Comments)     Severe depression    Sudafed [pseudoephedrine hcl] Other (See Comments)     Pt states she flat lined    Prednisone Other (See Comments)     Elevated BP    Prozac [fluoxetine] Swelling    Thorazine [chlorpromazine] Swelling        HPI   HPI    2018, 4:58 PM   History obtained from the patient      History of Present Illness: Esperanza Aquino is a 57 y.o. female patient who presents to the Emergency Department for increasing SOB which onset gradually over the last few days. Symptoms are intermittent and moderate in severity. Pt reports that her sxs have been worsening. She is not on oxygen at home and but she does smoke. No mitigating or exacerbating factors reported. Associated sxs reported are generalized L sided pain. Patient denies any cough, wheezing, CP, leg pain/swelling, abd pain, HA, numbness/tingling, HA, dizziness, and all other sxs at this time. No further complaints or concerns at this time.         Arrival mode: Personal vehicle    PCP: JOVANNI Figueroa MD       Past Medical History:  Past Medical History:   Diagnosis Date    Bipolar 1 disorder     not sure what type    Cancer     Uterine Cancer    COPD (chronic obstructive pulmonary disease)     Depression     Encounter for blood transfusion     Hypertension     Personal history of peptic ulcer disease        Past Surgical History:  Past Surgical History:   Procedure Laterality Date    APPENDECTOMY       SECTION      CHOLECYSTECTOMY      HERNIA REPAIR      HYSTERECTOMY            Family History:  Family History   Problem Relation Age of Onset    Lung cancer Father     Cancer Father     Brain cancer Brother     Cancer Brother        Social History:  Social History     Social History Main Topics    Smoking status: Current Some Day Smoker     Packs/day: 1.00     Years: 25.00     Types: Cigarettes     Last attempt to quit: 1/7/2015    Smokeless tobacco: Never Used      Comment: Patient now using E-Cigarrettes    Alcohol use No    Drug use: No    Sexual activity: Yes     Partners: Male       ROS   Review of Systems   Constitutional: Negative for fever.   HENT: Negative for sore throat.    Respiratory: Positive for shortness of breath. Negative for cough and wheezing.    Cardiovascular: Negative for chest pain and leg swelling.   Gastrointestinal: Negative for nausea.   Genitourinary: Negative for dysuria.   Musculoskeletal: Negative for back pain.        +  L sided pain   Skin: Negative for rash.   Neurological: Negative for dizziness, weakness, numbness and headaches.   Hematological: Does not bruise/bleed easily.   All other systems reviewed and are negative.    Physical Exam      Initial Vitals [01/24/18 1617]   BP Pulse Resp Temp SpO2   (!) 160/96 100 20 98 °F (36.7 °C) (!) 88 %      MAP       117.33          Physical Exam  Nursing Notes and Vital Signs Reviewed.  Constitutional: Patient is in no apparent distress. Well-developed and well-nourished.  Head: Atraumatic. Normocephalic.  Eyes: PERRL. EOM intact. Conjunctivae are not pale. No scleral icterus.  ENT: Mucous membranes are moist. Oropharynx is clear and symmetric.    Neck: Supple. Full ROM. No lymphadenopathy.  Cardiovascular: Regular rate. Regular rhythm. No murmurs, rubs, or gallops. Distal pulses are 2+ and symmetric.  Pulmonary/Chest: Pt's lungs are tight, but she can talk in full sentences. No respiratory distress. Clear to auscultation bilaterally. No wheezing or rales.  Abdominal: Soft and non-distended.  There is  no tenderness.  No rebound, guarding, or rigidity. Good bowel sounds.  Genitourinary: No CVA tenderness  Musculoskeletal: Moves all extremities. No obvious deformities. No edema. No calf tenderness.  Skin: Warm and dry.  Neurological:  Alert, awake, and appropriate.  Normal speech.  No acute focal neurological deficits are appreciated.  Psychiatric: Normal affect. Good eye contact. Appropriate in content.    ED Course    Critical Care  Date/Time: 1/24/2018 6:40 PM  Performed by: KWAN MUNOZ  Authorized by: KWAN MUNOZ   Direct patient critical care time: 10 minutes  Additional history critical care time: 5 minutes  Ordering / reviewing critical care time: 5 minutes  Documentation critical care time: 5 minutes  Consulting other physicians critical care time: 5 minutes  Consult with family critical care time: 5 minutes  Total critical care time (exclusive of procedural time) : 35 minutes  Critical care was necessary to treat or prevent imminent or life-threatening deterioration of the following conditions: respiratory failure.  Critical care was time spent personally by me on the following activities: blood draw for specimens, development of treatment plan with patient or surrogate, discussions with consultants, interpretation of cardiac output measurements, evaluation of patient's response to treatment, examination of patient, obtaining history from patient or surrogate, ordering and performing treatments and interventions, ordering and review of laboratory studies, ordering and review of radiographic studies, re-evaluation of patient's condition, review of old charts and pulse oximetry.  Comments: Pt is 86-88 percent on two liters. Pt was rechecked 35 minutes later. She is still wheezing.        ED Vital Signs:  Vitals:    01/24/18 1617 01/24/18 1653 01/24/18 1657 01/24/18 1701   BP: (!) 160/96      Pulse: 100 98 95 96   Resp: 20 (!) 24 (!) 22 20   Temp: 98 °F (36.7 °C)      TempSrc: Oral      SpO2:  "(!) 88% (!) 92%     Weight: 109.4 kg (241 lb 1.2 oz)      Height: 5' 5" (1.651 m)       01/24/18 1715 01/24/18 1730 01/24/18 1745 01/24/18 1800   BP:  (!) 144/69 (!) 157/73 (!) 150/63   Pulse: 97 100 98 97   Resp:  (!) 22 19 (!) 26   Temp:       TempSrc:       SpO2: (!) 87% (!) 85% (!) 86% (!) 84%   Weight:       Height:        01/24/18 1815 01/24/18 1830 01/24/18 1900 01/24/18 2005   BP: (!) 148/62 (!) 153/80 (!) 169/78 129/62   Pulse: 94 96 94 93   Resp: (!) 33 (!) 43 (!) 26    Temp:       TempSrc:       SpO2: (!) 84% (!) 89% (!) 93% (!) 92%   Weight:       Height:        01/24/18 2030   BP: (!) 136/96   Pulse: 91   Resp: 20   Temp: 98 °F (36.7 °C)   TempSrc: Oral   SpO2: (!) 89%   Weight: 110.5 kg (243 lb 9.7 oz)   Height: 5' 5" (1.651 m)       Abnormal Lab Results:  Labs Reviewed   CBC W/ AUTO DIFFERENTIAL - Abnormal; Notable for the following:        Result Value    Platelets 361 (*)     MPV 9.0 (*)     All other components within normal limits   COMPREHENSIVE METABOLIC PANEL - Abnormal; Notable for the following:     Potassium 2.7 (*)     Chloride 90 (*)     CO2 32 (*)     Glucose 162 (*)     Albumin 3.4 (*)     ALT 9 (*)     All other components within normal limits    Narrative:     K critical result(s) called and verbal readback obtained from   DOMINGA Edward, 01/24/2018 17:52   LACTIC ACID, PLASMA - Abnormal; Notable for the following:     Lactate (Lactic Acid) 2.9 (*)     All other components within normal limits   MAGNESIUM - Abnormal; Notable for the following:     Magnesium 1.4 (*)     All other components within normal limits   ISTAT PROCEDURE - Abnormal; Notable for the following:     POC PCO2 48.5 (*)     POC PO2 50 (*)     POC HCO3 33.8 (*)     POC SATURATED O2 86 (*)     All other components within normal limits   CULTURE, BLOOD   CULTURE, BLOOD   TROPONIN I   B-TYPE NATRIURETIC PEPTIDE   MAGNESIUM   B-TYPE NATRIURETIC PEPTIDE        All Lab Results:  Results for orders placed or performed during the " hospital encounter of 01/24/18   CBC auto differential   Result Value Ref Range    WBC 10.45 3.90 - 12.70 K/uL    RBC 4.90 4.00 - 5.40 M/uL    Hemoglobin 14.9 12.0 - 16.0 g/dL    Hematocrit 44.5 37.0 - 48.5 %    MCV 91 82 - 98 fL    MCH 30.4 27.0 - 31.0 pg    MCHC 33.5 32.0 - 36.0 g/dL    RDW 14.2 11.5 - 14.5 %    Platelets 361 (H) 150 - 350 K/uL    MPV 9.0 (L) 9.2 - 12.9 fL    Gran # 6.4 1.8 - 7.7 K/uL    Lymph # 3.3 1.0 - 4.8 K/uL    Mono # 0.6 0.3 - 1.0 K/uL    Eos # 0.1 0.0 - 0.5 K/uL    Baso # 0.03 0.00 - 0.20 K/uL    Gran% 61.5 38.0 - 73.0 %    Lymph% 31.4 18.0 - 48.0 %    Mono% 5.8 4.0 - 15.0 %    Eosinophil% 1.0 0.0 - 8.0 %    Basophil% 0.3 0.0 - 1.9 %    Differential Method Automated    Comprehensive metabolic panel   Result Value Ref Range    Sodium 137 136 - 145 mmol/L    Potassium 2.7 (LL) 3.5 - 5.1 mmol/L    Chloride 90 (L) 95 - 110 mmol/L    CO2 32 (H) 23 - 29 mmol/L    Glucose 162 (H) 70 - 110 mg/dL    BUN, Bld 8 6 - 20 mg/dL    Creatinine 0.8 0.5 - 1.4 mg/dL    Calcium 9.5 8.7 - 10.5 mg/dL    Total Protein 8.0 6.0 - 8.4 g/dL    Albumin 3.4 (L) 3.5 - 5.2 g/dL    Total Bilirubin 0.3 0.1 - 1.0 mg/dL    Alkaline Phosphatase 69 55 - 135 U/L    AST 12 10 - 40 U/L    ALT 9 (L) 10 - 44 U/L    Anion Gap 15 8 - 16 mmol/L    eGFR if African American >60 >60 mL/min/1.73 m^2    eGFR if non African American >60 >60 mL/min/1.73 m^2   Troponin I #1   Result Value Ref Range    Troponin I <0.006 0.000 - 0.026 ng/mL   Lactic acid, plasma   Result Value Ref Range    Lactate (Lactic Acid) 2.9 (H) 0.5 - 2.2 mmol/L   Brain natriuretic peptide   Result Value Ref Range    BNP 41 0 - 99 pg/mL   Magnesium   Result Value Ref Range    Magnesium 1.4 (L) 1.6 - 2.6 mg/dL   ISTAT PROCEDURE   Result Value Ref Range    POC PH 7.450 7.35 - 7.45    POC PCO2 48.5 (H) 35 - 45 mmHg    POC PO2 50 (LL) 80 - 100 mmHg    POC HCO3 33.8 (H) 24 - 28 mmol/L    POC BE 10 -2 to 2 mmol/L    POC SATURATED O2 86 (L) 95 - 100 %    Sample ARTERIAL      Site LR     Allens Test Pass     DelSys Room Air          Imaging Results:  Imaging Results          X-Ray Chest PA And Lateral (Final result)  Result time 01/24/18 17:44:56    Final result by Robert Flanagan Jr., MD (01/24/18 17:44:56)                 Impression:          Vascular congestion.  Left lower lobe infiltrate      Electronically signed by: ROBERT FLANAGAN MD  Date:     01/24/18  Time:    17:44              Narrative:    EXAM:   XR CHEST PA AND LATERAL    CLINICAL HISTORY:  Chest Pain      COMPARISON:  08/02/2015    FINDINGS:   Heart size is normal.  Central pulmonary vascular congestion.  Hiatal hernia.. No large effusion.  Possible developing left lower lobe/retrocardiac alveolar density/infiltrate..                             The EKG was ordered, reviewed, and independently interpreted by the ED provider.  Interpretation time: 1727  Rate: 102 BPM  Rhythm: sinus tachycardia  Interpretation: Cannot rule out anterior infarct. Abonrmal ECG. No STEMI.           The Emergency Provider reviewed the vital signs and test results, which are outlined above.    ED Discussion   7:07 PM: Discussed case with Dr. Santos ([facility]). Dr. Santos agrees with current care and management of pt and accepts admission.   Admitting Service: [facility] medicine   Admitting Physician: Dr. Santos  Admit to: Tele    7:07 PM: Re-evaluated pt. Pt will be admitted for IV abx, fluids, and a breathing tx. I have discussed test results, shared treatment plan, and the need for admission with patient and family at bedside. Pt and family express understanding at this time and agree with all information. All questions answered. Pt and family have no further questions or concerns at this time. Pt is ready for admit.      ED Medication(s):  Medications   magnesium sulfate 2g in water 50mL IVPB (premix) (2 g Intravenous New Bag 1/24/18 2200)   atorvastatin tablet 20 mg (not administered)   buPROPion TB24 tablet 300 mg (not  administered)   cyclobenzaprine tablet 10 mg (not administered)   fluticasone 50 mcg/actuation nasal spray 50 mcg (not administered)   gabapentin capsule 300 mg (300 mg Oral Given 1/24/18 2215)   hydroCHLOROthiazide tablet 25 mg (not administered)   levothyroxine tablet 112 mcg (not administered)   metoprolol tartrate (LOPRESSOR) tablet 50 mg (50 mg Oral Given 1/24/18 2216)   montelukast tablet 10 mg (10 mg Oral Given 1/24/18 2216)   OLANZapine tablet 20 mg (20 mg Oral Given 1/24/18 2215)   pantoprazole EC tablet 40 mg (not administered)   oxybutynin tablet 5 mg (5 mg Oral Given 1/24/18 2216)   LORazepam tablet 1 mg (not administered)   valproic acid capsule 500 mg (500 mg Oral Given 1/24/18 2224)   hydrOXYzine HCl tablet 25 mg (not administered)   promethazine tablet 25 mg (not administered)   sodium chloride 0.9% flush 3 mL (not administered)   enoxaparin injection 40 mg (40 mg Subcutaneous Given 1/24/18 2217)   polyethylene glycol packet 17 g (not administered)   albuterol-ipratropium 2.5mg-0.5mg/3mL nebulizer solution 3 mL (not administered)   arformoterol nebulizer solution 15 mcg (not administered)   azithromycin (ZITHROMAX) 500 mg in sodium chloride 0.9% 250 mL IVPB (not administered)   cefTRIAXone (ROCEPHIN) 1 g in dextrose 5 % 50 mL IVPB (not administered)   albuterol-ipratropium 2.5mg-0.5mg/3mL nebulizer solution 3 mL (3 mLs Nebulization Given 1/24/18 1701)   methylPREDNISolone sodium succinate injection 125 mg (125 mg Intravenous Given 1/24/18 1836)   potassium chloride SA CR tablet 40 mEq (40 mEq Oral Given 1/24/18 1840)   cefTRIAXone injection 1 g (1 g Intravenous Given 1/24/18 1842)   azithromycin (ZITHROMAX) 500 mg in sodium chloride 0.9% 250 mL IVPB (500 mg Intravenous New Bag 1/24/18 2014)   sodium chloride 0.9% bolus 1,000 mL (0 mLs Intravenous Stopped 1/24/18 2010)   0.9%  NaCl infusion (1,000 mLs Intravenous New Bag 1/24/18 8055)       Current Discharge Medication List                Medical  Decision Making    Medical Decision Making:   Clinical Tests:   Lab Tests: Reviewed and Ordered  Radiological Study: Reviewed and Ordered  Medical Tests: Reviewed and Ordered           Scribe Attestation:   Scribe #1: I performed the above scribed service and the documentation accurately describes the services I performed. I attest to the accuracy of the note.    Attending:   Physician Attestation Statement for Scribe #1: I, Angela Lopez Do, MD, personally performed the services described in this documentation, as scribed by Jana Puri, in my presence, and it is both accurate and complete.          Clinical Impression       ICD-10-CM ICD-9-CM   1. Pneumonia of right lower lobe due to infectious organism J18.1 486   2. Chest pain R07.9 786.50   3. Acute respiratory failure with hypoxia J96.01 518.81   4. Hypokalemia E87.6 276.8       Disposition:   Disposition: Admitted  Condition: Fair         Angela Lopez Do, MD  01/24/18 0238

## 2018-01-25 PROBLEM — R65.20 SEVERE SEPSIS: Status: ACTIVE | Noted: 2018-01-25

## 2018-01-25 PROBLEM — A41.9 SEPSIS: Status: ACTIVE | Noted: 2018-01-25

## 2018-01-25 PROBLEM — A41.9 SEVERE SEPSIS: Status: ACTIVE | Noted: 2018-01-25

## 2018-01-25 PROBLEM — A41.9 SEVERE SEPSIS: Status: RESOLVED | Noted: 2018-01-25 | Resolved: 2018-01-25

## 2018-01-25 PROBLEM — R65.20 SEVERE SEPSIS: Status: RESOLVED | Noted: 2018-01-25 | Resolved: 2018-01-25

## 2018-01-25 LAB
ANION GAP SERPL CALC-SCNC: 13 MMOL/L
BASOPHILS # BLD AUTO: 0.01 K/UL
BASOPHILS NFR BLD: 0.1 %
BUN SERPL-MCNC: 11 MG/DL
CALCIUM SERPL-MCNC: 9.1 MG/DL
CHLORIDE SERPL-SCNC: 97 MMOL/L
CO2 SERPL-SCNC: 25 MMOL/L
CREAT SERPL-MCNC: 0.8 MG/DL
DIFFERENTIAL METHOD: ABNORMAL
EOSINOPHIL # BLD AUTO: 0 K/UL
EOSINOPHIL NFR BLD: 0 %
ERYTHROCYTE [DISTWIDTH] IN BLOOD BY AUTOMATED COUNT: 14.3 %
EST. GFR  (AFRICAN AMERICAN): >60 ML/MIN/1.73 M^2
EST. GFR  (NON AFRICAN AMERICAN): >60 ML/MIN/1.73 M^2
GLUCOSE SERPL-MCNC: 230 MG/DL
HCT VFR BLD AUTO: 41 %
HGB BLD-MCNC: 13.3 G/DL
LACTATE SERPL-SCNC: 2.4 MMOL/L
LACTATE SERPL-SCNC: 2.6 MMOL/L
LACTATE SERPL-SCNC: 4 MMOL/L
LACTATE SERPL-SCNC: 4.9 MMOL/L
LYMPHOCYTES # BLD AUTO: 1.4 K/UL
LYMPHOCYTES NFR BLD: 13.4 %
MCH RBC QN AUTO: 29.6 PG
MCHC RBC AUTO-ENTMCNC: 32.4 G/DL
MCV RBC AUTO: 91 FL
MONOCYTES # BLD AUTO: 0.2 K/UL
MONOCYTES NFR BLD: 2 %
NEUTROPHILS # BLD AUTO: 9 K/UL
NEUTROPHILS NFR BLD: 84.5 %
PLATELET # BLD AUTO: 337 K/UL
PMV BLD AUTO: 9.1 FL
POTASSIUM SERPL-SCNC: 3.5 MMOL/L
RBC # BLD AUTO: 4.5 M/UL
SODIUM SERPL-SCNC: 135 MMOL/L
WBC # BLD AUTO: 10.61 K/UL

## 2018-01-25 PROCEDURE — 83605 ASSAY OF LACTIC ACID: CPT | Mod: 91

## 2018-01-25 PROCEDURE — 63600175 PHARM REV CODE 636 W HCPCS: Performed by: NURSE PRACTITIONER

## 2018-01-25 PROCEDURE — 63600175 PHARM REV CODE 636 W HCPCS: Performed by: INTERNAL MEDICINE

## 2018-01-25 PROCEDURE — 94640 AIRWAY INHALATION TREATMENT: CPT

## 2018-01-25 PROCEDURE — 36415 COLL VENOUS BLD VENIPUNCTURE: CPT

## 2018-01-25 PROCEDURE — 21400001 HC TELEMETRY ROOM

## 2018-01-25 PROCEDURE — 25000003 PHARM REV CODE 250: Performed by: NURSE PRACTITIONER

## 2018-01-25 PROCEDURE — 25000242 PHARM REV CODE 250 ALT 637 W/ HCPCS: Performed by: NURSE PRACTITIONER

## 2018-01-25 PROCEDURE — 87205 SMEAR GRAM STAIN: CPT

## 2018-01-25 PROCEDURE — 85025 COMPLETE CBC W/AUTO DIFF WBC: CPT

## 2018-01-25 PROCEDURE — 94761 N-INVAS EAR/PLS OXIMETRY MLT: CPT

## 2018-01-25 PROCEDURE — 80048 BASIC METABOLIC PNL TOTAL CA: CPT

## 2018-01-25 PROCEDURE — 27000221 HC OXYGEN, UP TO 24 HOURS

## 2018-01-25 PROCEDURE — 94799 UNLISTED PULMONARY SVC/PX: CPT

## 2018-01-25 PROCEDURE — 87070 CULTURE OTHR SPECIMN AEROBIC: CPT

## 2018-01-25 PROCEDURE — 25000003 PHARM REV CODE 250: Performed by: INTERNAL MEDICINE

## 2018-01-25 RX ORDER — SODIUM CHLORIDE 9 MG/ML
INJECTION, SOLUTION INTRAVENOUS CONTINUOUS
Status: DISCONTINUED | OUTPATIENT
Start: 2018-01-25 | End: 2018-01-27 | Stop reason: HOSPADM

## 2018-01-25 RX ORDER — HYDROXYZINE HYDROCHLORIDE 25 MG/1
25 TABLET, FILM COATED ORAL 4 TIMES DAILY
Status: DISCONTINUED | OUTPATIENT
Start: 2018-01-25 | End: 2018-01-27 | Stop reason: HOSPADM

## 2018-01-25 RX ADMIN — IPRATROPIUM BROMIDE AND ALBUTEROL SULFATE 3 ML: .5; 3 SOLUTION RESPIRATORY (INHALATION) at 01:01

## 2018-01-25 RX ADMIN — PANTOPRAZOLE SODIUM 40 MG: 40 TABLET, DELAYED RELEASE ORAL at 09:01

## 2018-01-25 RX ADMIN — HYDROCHLOROTHIAZIDE 25 MG: 25 TABLET ORAL at 09:01

## 2018-01-25 RX ADMIN — LORAZEPAM 1 MG: 1 TABLET ORAL at 05:01

## 2018-01-25 RX ADMIN — IPRATROPIUM BROMIDE AND ALBUTEROL SULFATE 3 ML: .5; 3 SOLUTION RESPIRATORY (INHALATION) at 12:01

## 2018-01-25 RX ADMIN — HYDROXYZINE HYDROCHLORIDE 25 MG: 25 TABLET, FILM COATED ORAL at 12:01

## 2018-01-25 RX ADMIN — GABAPENTIN 300 MG: 300 CAPSULE ORAL at 02:01

## 2018-01-25 RX ADMIN — LEVOTHYROXINE SODIUM 112 MCG: 112 TABLET ORAL at 05:01

## 2018-01-25 RX ADMIN — GABAPENTIN 300 MG: 300 CAPSULE ORAL at 10:01

## 2018-01-25 RX ADMIN — ARFORMOTEROL TARTRATE 15 MCG: 15 SOLUTION RESPIRATORY (INHALATION) at 01:01

## 2018-01-25 RX ADMIN — OXYBUTYNIN CHLORIDE 5 MG: 5 TABLET ORAL at 08:01

## 2018-01-25 RX ADMIN — VALPROIC ACID 500 MG: 250 CAPSULE, LIQUID FILLED ORAL at 09:01

## 2018-01-25 RX ADMIN — AZITHROMYCIN MONOHYDRATE 500 MG: 500 INJECTION, POWDER, LYOPHILIZED, FOR SOLUTION INTRAVENOUS at 08:01

## 2018-01-25 RX ADMIN — VALPROIC ACID 500 MG: 250 CAPSULE, LIQUID FILLED ORAL at 08:01

## 2018-01-25 RX ADMIN — GABAPENTIN 300 MG: 300 CAPSULE ORAL at 05:01

## 2018-01-25 RX ADMIN — ARFORMOTEROL TARTRATE 15 MCG: 15 SOLUTION RESPIRATORY (INHALATION) at 09:01

## 2018-01-25 RX ADMIN — MONTELUKAST SODIUM 10 MG: 10 TABLET, COATED ORAL at 08:01

## 2018-01-25 RX ADMIN — HYDROXYZINE HYDROCHLORIDE 25 MG: 25 TABLET, FILM COATED ORAL at 05:01

## 2018-01-25 RX ADMIN — IPRATROPIUM BROMIDE AND ALBUTEROL SULFATE 3 ML: .5; 3 SOLUTION RESPIRATORY (INHALATION) at 03:01

## 2018-01-25 RX ADMIN — ATORVASTATIN CALCIUM 20 MG: 10 TABLET, FILM COATED ORAL at 09:01

## 2018-01-25 RX ADMIN — BUPROPION HYDROCHLORIDE 300 MG: 150 TABLET, FILM COATED, EXTENDED RELEASE ORAL at 09:01

## 2018-01-25 RX ADMIN — IPRATROPIUM BROMIDE AND ALBUTEROL SULFATE 3 ML: .5; 3 SOLUTION RESPIRATORY (INHALATION) at 07:01

## 2018-01-25 RX ADMIN — SODIUM CHLORIDE 1000 ML: 0.9 INJECTION, SOLUTION INTRAVENOUS at 09:01

## 2018-01-25 RX ADMIN — FLUTICASONE PROPIONATE 50 MCG: 50 SPRAY, METERED NASAL at 09:01

## 2018-01-25 RX ADMIN — HYDROXYZINE HYDROCHLORIDE 25 MG: 25 TABLET, FILM COATED ORAL at 08:01

## 2018-01-25 RX ADMIN — POLYETHYLENE GLYCOL 3350 17 G: 17 POWDER, FOR SOLUTION ORAL at 09:01

## 2018-01-25 RX ADMIN — METOPROLOL TARTRATE 50 MG: 50 TABLET ORAL at 08:01

## 2018-01-25 RX ADMIN — OLANZAPINE 20 MG: 5 TABLET, FILM COATED ORAL at 08:01

## 2018-01-25 RX ADMIN — IPRATROPIUM BROMIDE AND ALBUTEROL SULFATE 3 ML: .5; 3 SOLUTION RESPIRATORY (INHALATION) at 05:01

## 2018-01-25 RX ADMIN — CEFTRIAXONE 1 G: 1 INJECTION, SOLUTION INTRAVENOUS at 07:01

## 2018-01-25 RX ADMIN — SODIUM CHLORIDE: 0.9 INJECTION, SOLUTION INTRAVENOUS at 09:01

## 2018-01-25 RX ADMIN — ENOXAPARIN SODIUM 40 MG: 100 INJECTION SUBCUTANEOUS at 05:01

## 2018-01-25 RX ADMIN — METOPROLOL TARTRATE 50 MG: 50 TABLET ORAL at 09:01

## 2018-01-25 RX ADMIN — HYDROXYZINE HYDROCHLORIDE 25 MG: 25 TABLET, FILM COATED ORAL at 02:01

## 2018-01-25 RX ADMIN — ARFORMOTEROL TARTRATE 15 MCG: 15 SOLUTION RESPIRATORY (INHALATION) at 08:01

## 2018-01-25 RX ADMIN — IPRATROPIUM BROMIDE AND ALBUTEROL SULFATE 3 ML: .5; 3 SOLUTION RESPIRATORY (INHALATION) at 09:01

## 2018-01-25 RX ADMIN — OXYBUTYNIN CHLORIDE 5 MG: 5 TABLET ORAL at 09:01

## 2018-01-25 NOTE — PLAN OF CARE
Problem: Patient Care Overview  Goal: Plan of Care Review  Outcome: Ongoing (interventions implemented as appropriate)  No problems or issues this shift. NSR noted on tele monitor. IVF infusing and medications given as ordered. No falls or injury. Bed low, friend present at bedside. Report given to oncoming nurse.

## 2018-01-25 NOTE — PLAN OF CARE
Problem: Fall Risk (Adult)  Goal: Absence of Falls  Patient will demonstrate the desired outcomes by discharge/transition of care.   Outcome: Ongoing (interventions implemented as appropriate)  To call for assistance when needed.

## 2018-01-25 NOTE — H&P
[facility] Hospital Medicine  History & Physical    Patient Name: Esperanza Aquino  MRN: [id number]  Admission Date: 2018  Attending Physician: Ignacio Santos MD   Primary Care Provider: JOVANNI Figueroa MD         Patient information was obtained from patient, past medical records and ER records.     Subjective:     Principal Problem:Pneumonia due to infectious organism    Chief Complaint:   Chief Complaint   Patient presents with    Shortness of Breath     pt reports some SOB and cough that has been gradually increasing over the past few weeks, pt reports pain on Lt side        HPI: 56 y/o female presented to ED with c/o SOB that onset gradually 2-3 weeks ago. Associated symptoms include productive cough, left side pain, and chills. No mitigating or relieving factors. Pt denies fever, Cp, wheezes, ankle edema, n/v, and all other symptoms at present. RR 56, PO2 50 on Ra, PCO2 48.5. Pt with prod cough with thick, green sputum. Lactate 2.9. CXR revealed LLL infiltrate. Pt will be admitted to telemetry for Pneumonia and respiratory failure with hypoxia under the care of Hospital Medicine.     Past Medical History:   Diagnosis Date    Bipolar 1 disorder     not sure what type    Cancer     Uterine Cancer    COPD (chronic obstructive pulmonary disease)     Depression     Encounter for blood transfusion     Hypertension     Personal history of peptic ulcer disease        Past Surgical History:   Procedure Laterality Date    APPENDECTOMY       SECTION      CHOLECYSTECTOMY      HERNIA REPAIR      HYSTERECTOMY         Review of patient's allergies indicates:   Allergen Reactions    Lithobid [lithium carbonate] Other (See Comments)     Severe depression    Sudafed [pseudoephedrine hcl] Other (See Comments)     Pt states she flat lined    Prednisone Other (See Comments)     Elevated BP    Prozac [fluoxetine] Swelling    Thorazine [chlorpromazine] Swelling       No current  facility-administered medications on file prior to encounter.      Current Outpatient Prescriptions on File Prior to Encounter   Medication Sig    ferrous gluconate (FERGON) 324 MG tablet Take 1 tablet (324 mg total) by mouth 2 (two) times daily with meals.    lorazepam (ATIVAN) 1 MG tablet Take 1 mg by mouth every 6 (six) hours as needed for Anxiety.    lurasidone (LATUDA) 40 mg Tab tablet Take 80 mg by mouth once daily.    valproic acid (DEPAKENE) 250 mg capsule Take 500 mg by mouth 2 (two) times daily.     pantoprazole (PROTONIX) 40 MG tablet Take 1 tablet (40 mg total) by mouth 2 (two) times daily. BID for 14 days through June 21, 2015 then once a day.     Family History     Problem Relation (Age of Onset)    Brain cancer Brother    Cancer Father, Brother    Lung cancer Father        Social History Main Topics    Smoking status: Current Some Day Smoker     Packs/day: 1.00     Years: 25.00     Types: Cigarettes     Last attempt to quit: 1/7/2015    Smokeless tobacco: Never Used      Comment: Patient now using E-Cigarrettes    Alcohol use No    Drug use: No    Sexual activity: Not Currently     Review of Systems   Constitutional: Positive for chills and fatigue. Negative for activity change, appetite change and fever.   HENT: Positive for congestion, postnasal drip, sinus pain and sinus pressure.    Respiratory: Positive for cough and shortness of breath. Negative for wheezing.    Cardiovascular: Negative for chest pain, palpitations and leg swelling.   Gastrointestinal: Positive for constipation, nausea and vomiting. Negative for abdominal distention, abdominal pain and diarrhea.   Endocrine: Negative for polydipsia, polyphagia and polyuria.   Genitourinary: Negative for dysuria, hematuria and urgency.   Musculoskeletal: Positive for arthralgias (to left side that is generalized). Negative for joint swelling.   Skin: Negative for color change and wound.   Allergic/Immunologic: Positive for environmental  allergies.   Neurological: Positive for dizziness and weakness. Negative for speech difficulty.   Psychiatric/Behavioral: Negative for agitation, behavioral problems and confusion.     Objective:     Vital Signs (Most Recent):  Temp: 98 °F (36.7 °C) (01/24/18 1617)  Pulse: 96 (01/24/18 1830)  Resp: (!) 43 (01/24/18 1830)  BP: (!) 153/80 (01/24/18 1830)  SpO2: (!) 93 % (01/24/18 1900) Vital Signs (24h Range):  Temp:  [98 °F (36.7 °C)] 98 °F (36.7 °C)  Pulse:  [] 96  Resp:  [19-43] 43  SpO2:  [84 %-93 %] 93 %  BP: (144-160)/(62-96) 153/80     Weight: 109.4 kg (241 lb 1.2 oz)  Body mass index is 40.12 kg/m².    Physical Exam   Constitutional: She is oriented to person, place, and time. She appears well-developed and well-nourished.   HENT:   Head: Normocephalic and atraumatic.   Mouth/Throat: Oropharynx is clear and moist.   Eyes: EOM are normal. Pupils are equal, round, and reactive to light.   Neck: Normal range of motion. Neck supple. No JVD present.   Cardiovascular: Normal rate, regular rhythm, normal heart sounds and intact distal pulses.  Exam reveals no gallop and no friction rub.    No murmur heard.  Pulmonary/Chest: Breath sounds normal. She is in respiratory distress. She has no wheezes.   SOB with talking   Abdominal: Soft. Bowel sounds are normal. She exhibits no distension. There is no tenderness.   Genitourinary:   Genitourinary Comments: deferred   Musculoskeletal: Normal range of motion. She exhibits no edema or tenderness.   Neurological: She is alert and oriented to person, place, and time. No cranial nerve deficit.   Skin: Skin is warm and dry. Capillary refill takes 2 to 3 seconds. No erythema.   Psychiatric: She has a normal mood and affect. Her behavior is normal. Judgment and thought content normal.   Nursing note and vitals reviewed.        CRANIAL NERVES     CN III, IV, VI   Pupils are equal, round, and reactive to light.  Extraocular motions are normal.     Assistance with smoking  cessation was offered, including:  [x]  Medications  [x]  Counseling  [x]  Printed Information on Smoking Cessation  []  Referral to a Smoking Cessation Program    Patient was counseled regarding smoking for >10 minutes.       Significant Labs:   Recent Lab Results       01/24/18  1857 01/24/18  1825 01/24/18  1711      Albumin   3.4(L)     Alkaline Phosphatase   69     Allens Test Pass       ALT   9(L)     Anion Gap   15     AST   12     Baso #   0.03     Basophil%   0.3     Total Bilirubin   0.3  Comment:  For infants and newborns, interpretation of results should be based  on gestational age, weight and in agreement with clinical  observations.  Premature Infant recommended reference ranges:  Up to 24 hours.............<8.0 mg/dL  Up to 48 hours............<12.0 mg/dL  3-5 days..................<15.0 mg/dL  6-29 days.................<15.0 mg/dL       BNP   41  Comment:  Values of less than 100 pg/ml are consistent with non-CHF populations.     Site LR       BUN, Bld   8     Calcium   9.5     Chloride   90(L)     CO2   32(H)     Creatinine   0.8     DelSys Room Air       Differential Method   Automated     eGFR if    >60     eGFR if non    >60  Comment:  Calculation used to obtain the estimated glomerular filtration  rate (eGFR) is the CKD-EPI equation.        Eos #   0.1     Eosinophil%   1.0     Glucose   162(H)     Gran #   6.4     Gran%   61.5     Hematocrit   44.5     Hemoglobin   14.9     Lactate, Cesar  2.9  Comment:  Falsely low lactic acid results can be found in samples   containing >=13.0 mg/dL total bilirubin and/or >=3.5 mg/dL   direct bilirubin.  (H)      Lymph #   3.3     Lymph%   31.4     Magnesium   1.4(L)     MCH   30.4     MCHC   33.5     MCV   91     Mono #   0.6     Mono%   5.8     MPV   9.0(L)     Platelets   361(H)     POC BE 10       POC HCO3 33.8(H)       POC PCO2 48.5(H)       POC PH 7.450       POC PO2 50(LL)       POC SATURATED O2 86(L)       Potassium    2.7  Comment:  K critical result(s) called and verbal readback obtained from   DOMINGA Edward, 01/24/2018 17:52  (LL)     Total Protein   8.0     RBC   4.90     RDW   14.2     Sample ARTERIAL       Sodium   137     Troponin I   <0.006  Comment:  The reference interval for Troponin I represents the 99th percentile   cutoff   for our facility and is consistent with 3rd generation assay   performance.       WBC   10.45         All pertinent labs within the past 24 hours have been reviewed.    Significant Imaging: I have reviewed all pertinent imaging results/findings within the past 24 hours.     Imaging Results          X-Ray Chest PA And Lateral (Final result)  Result time 01/24/18 17:44:56    Final result by Ann Flanagan Jr., MD (01/24/18 17:44:56)                 Impression:          Vascular congestion.  Left lower lobe infiltrate      Electronically signed by: ANN FLANAGAN MD  Date:     01/24/18  Time:    17:44              Narrative:    EXAM:   XR CHEST PA AND LATERAL    CLINICAL HISTORY:  Chest Pain      COMPARISON:  08/02/2015    FINDINGS:   Heart size is normal.  Central pulmonary vascular congestion.  Hiatal hernia.. No large effusion.  Possible developing left lower lobe/retrocardiac alveolar density/infiltrate..                                Assessment/Plan:     * Pneumonia due to infectious organism    --CXR shows Vascular congestion.  Left lower lobe infiltrate  --trend out lactate  --BC pending  --Sputum culture ordered  --rocephin and azithromycin given in ED  --IV rocephin and IV azithromycin  --lelandonebs and brovana  --supplemental oxygen to maintain sats  --incentive spirometry                Acute on chronic respiratory failure with hypoxia    --RR >40  --PO2 50 on RA  --supplemental oxygen to maintain sats  --duo helga and brovana            Chronic obstructive pulmonary disease with acute lower respiratory infection    --IV steroids given in ED  --duonebs & brovana  --supplemental oxygen to  maintain sats  --IV rocephin and IV azithromycin            Bipolar disorder, current episode mixed, mild    --continue home bupropion, hydroxyzine, Zyprexa, valproic acid, and lorazempam.  --pt reports she does not take latuda          Essential hypertension    --continue home Hctz & lopressor  --cardiac diet          Hyperlipidemia    --continue home atorvastatin  --cardiac diet          Hypothyroid    --continue home levothyroxine        Tobacco abuse    --cessation counseling  --refuses nicotine patch            VTE Risk Mitigation         Ordered     Medium Risk of VTE  Once      01/24/18 1908     Place sequential compression device  Until discontinued    enoxaparin injection 40 mg daily    01/24/18 1908             ROSS Trejo-JOVANNI  Department of Hospital Medicine   [facility]

## 2018-01-25 NOTE — CONSULTS
Patient's nebulizer approved to be delivered through the [facility], approved by Michelle

## 2018-01-25 NOTE — ASSESSMENT & PLAN NOTE
--continue home bupropion, hydroxyzine, Zyprexa, valproic acid, and lorazempam.  --pt reports she does not take latuda

## 2018-01-25 NOTE — SUBJECTIVE & OBJECTIVE
Past Medical History:   Diagnosis Date    Bipolar 1 disorder     not sure what type    Cancer     Uterine Cancer    COPD (chronic obstructive pulmonary disease)     Depression     Encounter for blood transfusion     Hypertension     Personal history of peptic ulcer disease        Past Surgical History:   Procedure Laterality Date    APPENDECTOMY       SECTION      CHOLECYSTECTOMY      HERNIA REPAIR      HYSTERECTOMY         Review of patient's allergies indicates:   Allergen Reactions    Lithobid [lithium carbonate] Other (See Comments)     Severe depression    Sudafed [pseudoephedrine hcl] Other (See Comments)     Pt states she flat lined    Prednisone Other (See Comments)     Elevated BP    Prozac [fluoxetine] Swelling    Thorazine [chlorpromazine] Swelling       No current facility-administered medications on file prior to encounter.      Current Outpatient Prescriptions on File Prior to Encounter   Medication Sig    ferrous gluconate (FERGON) 324 MG tablet Take 1 tablet (324 mg total) by mouth 2 (two) times daily with meals.    lorazepam (ATIVAN) 1 MG tablet Take 1 mg by mouth every 6 (six) hours as needed for Anxiety.    lurasidone (LATUDA) 40 mg Tab tablet Take 80 mg by mouth once daily.    valproic acid (DEPAKENE) 250 mg capsule Take 500 mg by mouth 2 (two) times daily.     pantoprazole (PROTONIX) 40 MG tablet Take 1 tablet (40 mg total) by mouth 2 (two) times daily. BID for 14 days through 2015 then once a day.     Family History     Problem Relation (Age of Onset)    Brain cancer Brother    Cancer Father, Brother    Lung cancer Father        Social History Main Topics    Smoking status: Current Some Day Smoker     Packs/day: 1.00     Years: 25.00     Types: Cigarettes     Last attempt to quit: 2015    Smokeless tobacco: Never Used      Comment: Patient now using E-Cigarrettes    Alcohol use No    Drug use: No    Sexual activity: Not Currently     Review of  Systems   Constitutional: Positive for chills and fatigue. Negative for activity change, appetite change and fever.   HENT: Positive for congestion, postnasal drip, sinus pain and sinus pressure.    Respiratory: Positive for cough and shortness of breath. Negative for wheezing.    Cardiovascular: Negative for chest pain, palpitations and leg swelling.   Gastrointestinal: Positive for constipation, nausea and vomiting. Negative for abdominal distention, abdominal pain and diarrhea.   Endocrine: Negative for polydipsia, polyphagia and polyuria.   Genitourinary: Negative for dysuria, hematuria and urgency.   Musculoskeletal: Positive for arthralgias (to left side that is generalized). Negative for joint swelling.   Skin: Negative for color change and wound.   Allergic/Immunologic: Positive for environmental allergies.   Neurological: Positive for dizziness and weakness. Negative for speech difficulty.   Psychiatric/Behavioral: Negative for agitation, behavioral problems and confusion.     Objective:     Vital Signs (Most Recent):  Temp: 98 °F (36.7 °C) (01/24/18 1617)  Pulse: 96 (01/24/18 1830)  Resp: (!) 43 (01/24/18 1830)  BP: (!) 153/80 (01/24/18 1830)  SpO2: (!) 93 % (01/24/18 1900) Vital Signs (24h Range):  Temp:  [98 °F (36.7 °C)] 98 °F (36.7 °C)  Pulse:  [] 96  Resp:  [19-43] 43  SpO2:  [84 %-93 %] 93 %  BP: (144-160)/(62-96) 153/80     Weight: 109.4 kg (241 lb 1.2 oz)  Body mass index is 40.12 kg/m².    Physical Exam   Constitutional: She is oriented to person, place, and time. She appears well-developed and well-nourished.   HENT:   Head: Normocephalic and atraumatic.   Mouth/Throat: Oropharynx is clear and moist.   Eyes: EOM are normal. Pupils are equal, round, and reactive to light.   Neck: Normal range of motion. Neck supple. No JVD present.   Cardiovascular: Normal rate, regular rhythm, normal heart sounds and intact distal pulses.  Exam reveals no gallop and no friction rub.    No murmur  heard.  Pulmonary/Chest: Breath sounds normal. She is in respiratory distress. She has no wheezes.   SOB with talking   Abdominal: Soft. Bowel sounds are normal. She exhibits no distension. There is no tenderness.   Genitourinary:   Genitourinary Comments: deferred   Musculoskeletal: Normal range of motion. She exhibits no edema or tenderness.   Neurological: She is alert and oriented to person, place, and time. No cranial nerve deficit.   Skin: Skin is warm and dry. Capillary refill takes 2 to 3 seconds. No erythema.   Psychiatric: She has a normal mood and affect. Her behavior is normal. Judgment and thought content normal.   Nursing note and vitals reviewed.        CRANIAL NERVES     CN III, IV, VI   Pupils are equal, round, and reactive to light.  Extraocular motions are normal.     Assistance with smoking cessation was offered, including:  [x]  Medications  [x]  Counseling  [x]  Printed Information on Smoking Cessation  []  Referral to a Smoking Cessation Program    Patient was counseled regarding smoking for >10 minutes.       Significant Labs:   Recent Lab Results       01/24/18  1857 01/24/18  1825 01/24/18  1711      Albumin   3.4(L)     Alkaline Phosphatase   69     Allens Test Pass       ALT   9(L)     Anion Gap   15     AST   12     Baso #   0.03     Basophil%   0.3     Total Bilirubin   0.3  Comment:  For infants and newborns, interpretation of results should be based  on gestational age, weight and in agreement with clinical  observations.  Premature Infant recommended reference ranges:  Up to 24 hours.............<8.0 mg/dL  Up to 48 hours............<12.0 mg/dL  3-5 days..................<15.0 mg/dL  6-29 days.................<15.0 mg/dL       BNP   41  Comment:  Values of less than 100 pg/ml are consistent with non-CHF populations.     Site LR       BUN, Bld   8     Calcium   9.5     Chloride   90(L)     CO2   32(H)     Creatinine   0.8     [employer] Room Air       Differential Method   Automated      eGFR if    >60     eGFR if non    >60  Comment:  Calculation used to obtain the estimated glomerular filtration  rate (eGFR) is the CKD-EPI equation.        Eos #   0.1     Eosinophil%   1.0     Glucose   162(H)     Gran #   6.4     Gran%   61.5     Hematocrit   44.5     Hemoglobin   14.9     Lactate, Cesar  2.9  Comment:  Falsely low lactic acid results can be found in samples   containing >=13.0 mg/dL total bilirubin and/or >=3.5 mg/dL   direct bilirubin.  (H)      Lymph #   3.3     Lymph%   31.4     Magnesium   1.4(L)     MCH   30.4     MCHC   33.5     MCV   91     Mono #   0.6     Mono%   5.8     MPV   9.0(L)     Platelets   361(H)     POC BE 10       POC HCO3 33.8(H)       POC PCO2 48.5(H)       POC PH 7.450       POC PO2 50(LL)       POC SATURATED O2 86(L)       Potassium   2.7  Comment:  K critical result(s) called and verbal readback obtained from   DOMINGA Edward, 01/24/2018 17:52  (LL)     Total Protein   8.0     RBC   4.90     RDW   14.2     Sample ARTERIAL       Sodium   137     Troponin I   <0.006  Comment:  The reference interval for Troponin I represents the 99th percentile   cutoff   for our facility and is consistent with 3rd generation assay   performance.       WBC   10.45         All pertinent labs within the past 24 hours have been reviewed.    Significant Imaging: I have reviewed all pertinent imaging results/findings within the past 24 hours.     Imaging Results          X-Ray Chest PA And Lateral (Final result)  Result time 01/24/18 17:44:56    Final result by Robert Flanagan Jr., MD (01/24/18 17:44:56)                 Impression:          Vascular congestion.  Left lower lobe infiltrate      Electronically signed by: ROBERT FLANAGAN MD  Date:     01/24/18  Time:    17:44              Narrative:    EXAM:   XR CHEST PA AND LATERAL    CLINICAL HISTORY:  Chest Pain      COMPARISON:  08/02/2015    FINDINGS:   Heart size is normal.  Central pulmonary vascular  congestion.  Hiatal hernia.. No large effusion.  Possible developing left lower lobe/retrocardiac alveolar density/infiltrate..

## 2018-01-25 NOTE — HPI
58 y/o female presented to ED with c/o SOB that onset gradually 2-3 weeks ago. Associated symptoms include productive cough, left side pain, and chills. No mitigating or relieving factors. Pt denies fever, Cp, wheezes, ankle edema, n/v, and all other symptoms at present. RR 56, PO2 50 on Ra, PCO2 48.5. Pt with prod cough with thick, green sputum. Lactate 2.9. CXR revealed LLL infiltrate. Pt will be admitted to telemetry for Pneumonia and respiratory failure with hypoxia under the care of Hospital Medicine.

## 2018-01-25 NOTE — PROGRESS NOTES
[facility] Hospital Medicine  Progress Note    Patient Name: Esperanza Aquino  MRN: [id number]  Patient Class: IP- Inpatient   Admission Date: 1/24/2018  Length of Stay: 1 days  Attending Physician: Kip Olsne MD  Primary Care Provider: JOVANNI Figueroa MD        Subjective:     Principal Problem:Pneumonia due to infectious organism    HPI:  56 y/o female presented to ED with c/o SOB that onset gradually 2-3 weeks ago. Associated symptoms include productive cough, left side pain, and chills. No mitigating or relieving factors. Pt denies fever, Cp, wheezes, ankle edema, n/v, and all other symptoms at present. RR 56, PO2 50 on Ra, PCO2 48.5. Pt with prod cough with thick, green sputum. Lactate 2.9. CXR revealed LLL infiltrate. Pt will be admitted to telemetry for Pneumonia and respiratory failure with hypoxia under the care of [facility].     Hospital Course:  1/25/18 The patient reports that symptoms have somewhat improved overnight. Her lactic acid increased to 4.9. Aggressive fluid resuscitation started. Lactic acid trended down. Continue current management.     Interval History: The patient reports that symptoms have somewhat improved overnight. Her lactic acid increased to 4.9. Aggressive fluid resuscitation started. Lactic acid trended down. Continue current management.       Review of Systems   Constitutional: Positive for chills and fatigue. Negative for activity change, appetite change, diaphoresis, fever and unexpected weight change.   HENT: Positive for congestion, postnasal drip, sinus pain and sinus pressure. Negative for drooling, facial swelling, rhinorrhea, sneezing and sore throat.    Eyes: Negative for discharge, redness, itching and visual disturbance.   Respiratory: Positive for cough and shortness of breath. Negative for apnea, choking, chest tightness, wheezing and stridor.    Cardiovascular: Negative for chest pain, palpitations and leg swelling.   Gastrointestinal:  Positive for constipation, nausea and vomiting. Negative for abdominal distention, abdominal pain, anal bleeding, blood in stool and diarrhea.   Endocrine: Negative for polydipsia, polyphagia and polyuria.   Genitourinary: Negative for decreased urine volume, difficulty urinating, dysuria, frequency, hematuria, pelvic pain, urgency, vaginal bleeding and vaginal discharge.   Musculoskeletal: Positive for arthralgias (to left side that is generalized). Negative for back pain, gait problem, joint swelling, myalgias, neck pain and neck stiffness.   Skin: Negative for color change, pallor, rash and wound.   Allergic/Immunologic: Positive for environmental allergies.   Neurological: Positive for dizziness and weakness. Negative for seizures, facial asymmetry, speech difficulty, light-headedness, numbness and headaches.   Psychiatric/Behavioral: Negative for agitation, behavioral problems, confusion, hallucinations and suicidal ideas.   All other systems reviewed and are negative.    Objective:     Vital Signs (Most Recent):  Temp: 97.6 °F (36.4 °C) (01/25/18 1612)  Pulse: 82 (01/25/18 1612)  Resp: 18 (01/25/18 1612)  BP: 108/65 (01/25/18 1612)  SpO2: (!) 94 % (01/25/18 1612) Vital Signs (24h Range):  Temp:  [97.3 °F (36.3 °C)-98.5 °F (36.9 °C)] 97.6 °F (36.4 °C)  Pulse:  [] 82  Resp:  [14-43] 18  SpO2:  [84 %-94 %] 94 %  BP: ()/(55-96) 108/65     Weight: 112 kg (246 lb 14.6 oz)  Body mass index is 41.09 kg/m².    Intake/Output Summary (Last 24 hours) at 01/25/18 1702  Last data filed at 01/25/18 0945   Gross per 24 hour   Intake             2290 ml   Output                0 ml   Net             2290 ml      Physical Exam   Constitutional: She is oriented to person, place, and time. She appears well-developed and well-nourished.   HENT:   Head: Normocephalic and atraumatic.   Mouth/Throat: Oropharynx is clear and moist.   Eyes: Conjunctivae and EOM are normal. Pupils are equal, round, and reactive to light.    Neck: Normal range of motion. Neck supple. No JVD present.   Cardiovascular: Normal rate, regular rhythm, normal heart sounds and intact distal pulses.  Exam reveals no gallop and no friction rub.    No murmur heard.  Pulmonary/Chest: Breath sounds normal. She is in respiratory distress. She has no wheezes.   SOB with talking   Abdominal: Soft. Bowel sounds are normal. She exhibits no distension. There is no tenderness.   Genitourinary:   Genitourinary Comments: deferred   Musculoskeletal: Normal range of motion. She exhibits no edema, tenderness or deformity.   Neurological: She is alert and oriented to person, place, and time. She has normal reflexes. No cranial nerve deficit.   Skin: Skin is warm and dry. Capillary refill takes 2 to 3 seconds. No erythema.   Psychiatric: She has a normal mood and affect. Her behavior is normal. Judgment and thought content normal.   Nursing note and vitals reviewed.      Significant Labs: All pertinent labs within the past 24 hours have been reviewed.    Significant Imaging:   Imaging Results          X-Ray Chest PA And Lateral (Final result)  Result time 01/24/18 17:44:56    Final result by Robert Flanagan Jr., MD (01/24/18 17:44:56)                 Impression:          Vascular congestion.  Left lower lobe infiltrate      Electronically signed by: ROBERT FLANAGAN MD  Date:     01/24/18  Time:    17:44              Narrative:    EXAM:   XR CHEST PA AND LATERAL    CLINICAL HISTORY:  Chest Pain      COMPARISON:  08/02/2015    FINDINGS:   Heart size is normal.  Central pulmonary vascular congestion.  Hiatal hernia.. No large effusion.  Possible developing left lower lobe/retrocardiac alveolar density/infiltrate..                                 Assessment/Plan:      * Pneumonia due to infectious organism    --CXR shows Vascular congestion.  Left lower lobe infiltrate  --trend out lactate  --BC pending  --Sputum culture ordered  --rocephin and azithromycin given in ED  --IV  rocephin and IV azithromycin  --duonebs and brovana  --supplemental oxygen to maintain sats  --incentive spirometry                Sepsis    Blood cx pending   Continue IV ABX  Trend lactic acid   Fluid resuscitation           Tobacco abuse    --cessation counseling  --refuses nicotine patch          Essential hypertension    --continue home Hctz & lopressor  --cardiac diet          Hyperlipidemia    --continue home atorvastatin  --cardiac diet          Bipolar disorder, current episode mixed, mild    --continue home bupropion, hydroxyzine, Zyprexa, valproic acid, and lorazempam.  --pt reports she does not take latuda          Hypothyroid    --continue home levothyroxine        Chronic obstructive pulmonary disease with acute lower respiratory infection    --IV steroids given in ED  --durob & brovana  --supplemental oxygen to maintain sats  --IV rocephin and IV azithromycin            Acute on chronic respiratory failure with hypoxia    --RR >40  --PO2 50 on RA  --supplemental oxygen to maintain sats  --duo nebs and brovana              VTE Risk Mitigation         Ordered     enoxaparin injection 40 mg  Daily     Route:  Subcutaneous        01/24/18 2118     Medium Risk of VTE  Once      01/24/18 2118     Place sequential compression device  Until discontinued      01/24/18 1908              Chicho Etienne NP  Department of Hospital Medicine   [facility]

## 2018-01-25 NOTE — SUBJECTIVE & OBJECTIVE
Interval History: The patient reports that symptoms have somewhat improved overnight. Her lactic acid increased to 4.9. Aggressive fluid resuscitation started. Lactic acid trended down. Continue current management.       Review of Systems   Constitutional: Positive for chills and fatigue. Negative for activity change, appetite change, diaphoresis, fever and unexpected weight change.   HENT: Positive for congestion, postnasal drip, sinus pain and sinus pressure. Negative for drooling, facial swelling, rhinorrhea, sneezing and sore throat.    Eyes: Negative for discharge, redness, itching and visual disturbance.   Respiratory: Positive for cough and shortness of breath. Negative for apnea, choking, chest tightness, wheezing and stridor.    Cardiovascular: Negative for chest pain, palpitations and leg swelling.   Gastrointestinal: Positive for constipation, nausea and vomiting. Negative for abdominal distention, abdominal pain, anal bleeding, blood in stool and diarrhea.   Endocrine: Negative for polydipsia, polyphagia and polyuria.   Genitourinary: Negative for decreased urine volume, difficulty urinating, dysuria, frequency, hematuria, pelvic pain, urgency, vaginal bleeding and vaginal discharge.   Musculoskeletal: Positive for arthralgias (to left side that is generalized). Negative for back pain, gait problem, joint swelling, myalgias, neck pain and neck stiffness.   Skin: Negative for color change, pallor, rash and wound.   Allergic/Immunologic: Positive for environmental allergies.   Neurological: Positive for dizziness and weakness. Negative for seizures, facial asymmetry, speech difficulty, light-headedness, numbness and headaches.   Psychiatric/Behavioral: Negative for agitation, behavioral problems, confusion, hallucinations and suicidal ideas.   All other systems reviewed and are negative.    Objective:     Vital Signs (Most Recent):  Temp: 97.6 °F (36.4 °C) (01/25/18 1612)  Pulse: 82 (01/25/18 1612)  Resp:  18 (01/25/18 1612)  BP: 108/65 (01/25/18 1612)  SpO2: (!) 94 % (01/25/18 1612) Vital Signs (24h Range):  Temp:  [97.3 °F (36.3 °C)-98.5 °F (36.9 °C)] 97.6 °F (36.4 °C)  Pulse:  [] 82  Resp:  [14-43] 18  SpO2:  [84 %-94 %] 94 %  BP: ()/(55-96) 108/65     Weight: 112 kg (246 lb 14.6 oz)  Body mass index is 41.09 kg/m².    Intake/Output Summary (Last 24 hours) at 01/25/18 1702  Last data filed at 01/25/18 0945   Gross per 24 hour   Intake             2290 ml   Output                0 ml   Net             2290 ml      Physical Exam   Constitutional: She is oriented to person, place, and time. She appears well-developed and well-nourished.   HENT:   Head: Normocephalic and atraumatic.   Mouth/Throat: Oropharynx is clear and moist.   Eyes: Conjunctivae and EOM are normal. Pupils are equal, round, and reactive to light.   Neck: Normal range of motion. Neck supple. No JVD present.   Cardiovascular: Normal rate, regular rhythm, normal heart sounds and intact distal pulses.  Exam reveals no gallop and no friction rub.    No murmur heard.  Pulmonary/Chest: Breath sounds normal. She is in respiratory distress. She has no wheezes.   SOB with talking   Abdominal: Soft. Bowel sounds are normal. She exhibits no distension. There is no tenderness.   Genitourinary:   Genitourinary Comments: deferred   Musculoskeletal: Normal range of motion. She exhibits no edema, tenderness or deformity.   Neurological: She is alert and oriented to person, place, and time. She has normal reflexes. No cranial nerve deficit.   Skin: Skin is warm and dry. Capillary refill takes 2 to 3 seconds. No erythema.   Psychiatric: She has a normal mood and affect. Her behavior is normal. Judgment and thought content normal.   Nursing note and vitals reviewed.      Significant Labs: All pertinent labs within the past 24 hours have been reviewed.    Significant Imaging:   Imaging Results          X-Ray Chest PA And Lateral (Final result)  Result time  01/24/18 17:44:56    Final result by Robert Flanagan Jr., MD (01/24/18 17:44:56)                 Impression:          Vascular congestion.  Left lower lobe infiltrate      Electronically signed by: ROBERT FLANAGAN MD  Date:     01/24/18  Time:    17:44              Narrative:    EXAM:   XR CHEST PA AND LATERAL    CLINICAL HISTORY:  Chest Pain      COMPARISON:  08/02/2015    FINDINGS:   Heart size is normal.  Central pulmonary vascular congestion.  Hiatal hernia.. No large effusion.  Possible developing left lower lobe/retrocardiac alveolar density/infiltrate..

## 2018-01-25 NOTE — HOSPITAL COURSE
57 year old female admitted for acute respiratory failure with hypoxia and pneumonia. Patient being treated with MARYAM,LABA, IV Rocephin and po Azithromycin. Her lactic acid increased to 4.9. Aggressive fluid resuscitation started. Continue current medical management. Lactic acid has normalized. Blood cultures show NGTD. Patient qualifies for home oxygen, 87% at rest on RA. Social work consult for DME (nebulizer and oxygen). Symptoms improved. Case discussed with Dr. Salazar. Home meds reconciled. Home oxygen and neb machine at bedside. New prescriptions for duonebs and Levaquin given. Patient to follow-up with PCP in 3-5 days for hospital follow-up. Patient seen and examined on the date of discharge and found to be suitable for discharge.

## 2018-01-25 NOTE — ASSESSMENT & PLAN NOTE
--CXR shows Vascular congestion.  Left lower lobe infiltrate  --trend out lactate  --BC pending  --Sputum culture ordered  --rocephin and azithromycin given in ED  --IV rocephin and IV azithromycin  --duonebs and brovana  --supplemental oxygen to maintain sats  --incentive spirometry

## 2018-01-25 NOTE — ASSESSMENT & PLAN NOTE
--IV steroids given in ED  --ruel & brovana  --supplemental oxygen to maintain sats  --IV rocephin and IV azithromycin

## 2018-01-25 NOTE — PLAN OF CARE
Met with patient. Discussed need for a nebulizer. She stated that she has a used nebulizer provided through an MD office in 2015. Noted that she can get a nebulizer through her insurance and will request an order for a nebulizer.      01/25/18 1134   Discharge Assessment   Assessment Type Discharge Planning Assessment   Confirmed/corrected address and phone number on facesheet? Yes   Assessment information obtained from? Patient;Medical Record   Expected Length of Stay (days) (TBD)   Communicated expected length of stay with patient/caregiver yes   Prior to hospitilization cognitive status: Alert/Oriented   Prior to hospitalization functional status: Needs Assistance   Current cognitive status: Alert/Oriented   Current Functional Status: Needs Assistance   Facility Arrived From: (home)   Lives With other (see comments)  (Lives in rooming house, denied that it is a group home)   Able to Return to Prior Arrangements yes   Is patient able to care for self after discharge? Yes   Who are your caregiver(s) and their phone number(s)? (Received home visits through [facility] for counseling, she stated that they do not provide her medicine. She stated that she uses [facility] for her prescriptions. )   Patient's perception of discharge disposition home or selfcare   Readmission Within The Last 30 Days no previous admission in last 30 days   Equipment Currently Used at Home none   Do you have any problems affording any of your prescribed medications? No   Does the patient have transportation home? Yes   Transportation Available Medicaid transportation;family or friend will provide   Discharge Plan A Boarding home   Patient/Family In Agreement With Plan yes

## 2018-01-25 NOTE — ED NOTES
Patient c/o SOB and cough producing thick green sputum at this time. Patient reports chills, but denies fever over the past few days.    Patient moved to ED room 10, patient assisted onto stretcher and verbalized that she did not want to change into a gown. Patient placed on cardiac monitor, continuous pulse oximetry and automatic blood pressure cuff. Bed placed in low locked position, side rails up x 2, call light is within reach of patient or family, orientation to room and explanation of wait provided to family and patient, alarms set and turned on for monitor and pulse ox, awaiting MD evaluation and orders, will continue to monitor.    Patient identifies self as Esperanza Miles.      LOC: The patient is awake, alert and aware of environment with an appropriate affect, the patient is oriented x 3 and speaking appropriately.  APPEARANCE: Patient resting comfortably and in slight distress, patient is clean and well groomed, patient's clothing is properly fastened.  SKIN: The skin is warm and dry, color consistent with ethnicity, patient has normal skin turgor and moist mucus membranes, skin intact, no breakdown or bruising noted.  MUSCULOSKELETAL: Patient moving all extremities well, no obvious swelling or deformities noted.  RESPIRATORY: Airway is open and patent, respirations are spontaneous, patient has an increased effort and rate, no accessory muscle use noted. Breath sounds are diminished with expiratory wheezing noted to bilateral lung fields.  CARDIAC: Patient has a normal rate and rhythm, no periphreal edema noted, capillary refill < 3 seconds.  ABDOMEN: Soft and non tender to palpation, no distention noted.  NEUROLOGIC: PERRL, 3 mm bilaterally, eyes open spontaneously, behavior appropriate to situation, follows commands, facial expression symmetrical, bilateral hand grasp equal and even, purposeful motor response noted, normal sensation in all extremities when touched with a finger.

## 2018-01-26 LAB
ANION GAP SERPL CALC-SCNC: 9 MMOL/L
BASOPHILS # BLD AUTO: 0.03 K/UL
BASOPHILS NFR BLD: 0.3 %
BUN SERPL-MCNC: 6 MG/DL
CALCIUM SERPL-MCNC: 8.3 MG/DL
CHLORIDE SERPL-SCNC: 102 MMOL/L
CO2 SERPL-SCNC: 31 MMOL/L
CREAT SERPL-MCNC: 0.7 MG/DL
DIFFERENTIAL METHOD: ABNORMAL
EOSINOPHIL # BLD AUTO: 0 K/UL
EOSINOPHIL NFR BLD: 0.4 %
ERYTHROCYTE [DISTWIDTH] IN BLOOD BY AUTOMATED COUNT: 14.6 %
EST. GFR  (AFRICAN AMERICAN): >60 ML/MIN/1.73 M^2
EST. GFR  (NON AFRICAN AMERICAN): >60 ML/MIN/1.73 M^2
GLUCOSE SERPL-MCNC: 135 MG/DL
HCT VFR BLD AUTO: 38.2 %
HGB BLD-MCNC: 12 G/DL
LACTATE SERPL-SCNC: 2.2 MMOL/L
LYMPHOCYTES # BLD AUTO: 3.8 K/UL
LYMPHOCYTES NFR BLD: 33.4 %
MCH RBC QN AUTO: 29.3 PG
MCHC RBC AUTO-ENTMCNC: 31.4 G/DL
MCV RBC AUTO: 93 FL
MONOCYTES # BLD AUTO: 0.4 K/UL
MONOCYTES NFR BLD: 3.9 %
NEUTROPHILS # BLD AUTO: 7 K/UL
NEUTROPHILS NFR BLD: 62 %
PLATELET # BLD AUTO: 294 K/UL
PMV BLD AUTO: 8.7 FL
POTASSIUM SERPL-SCNC: 3.1 MMOL/L
RBC # BLD AUTO: 4.1 M/UL
SODIUM SERPL-SCNC: 142 MMOL/L
WBC # BLD AUTO: 11.22 K/UL

## 2018-01-26 PROCEDURE — 94761 N-INVAS EAR/PLS OXIMETRY MLT: CPT

## 2018-01-26 PROCEDURE — 25000242 PHARM REV CODE 250 ALT 637 W/ HCPCS: Performed by: NURSE PRACTITIONER

## 2018-01-26 PROCEDURE — 27000221 HC OXYGEN, UP TO 24 HOURS

## 2018-01-26 PROCEDURE — 94640 AIRWAY INHALATION TREATMENT: CPT

## 2018-01-26 PROCEDURE — 63600175 PHARM REV CODE 636 W HCPCS: Performed by: INTERNAL MEDICINE

## 2018-01-26 PROCEDURE — 99900035 HC TECH TIME PER 15 MIN (STAT)

## 2018-01-26 PROCEDURE — 85025 COMPLETE CBC W/AUTO DIFF WBC: CPT

## 2018-01-26 PROCEDURE — 25000003 PHARM REV CODE 250: Performed by: INTERNAL MEDICINE

## 2018-01-26 PROCEDURE — 83605 ASSAY OF LACTIC ACID: CPT

## 2018-01-26 PROCEDURE — 36415 COLL VENOUS BLD VENIPUNCTURE: CPT

## 2018-01-26 PROCEDURE — 94799 UNLISTED PULMONARY SVC/PX: CPT

## 2018-01-26 PROCEDURE — 80048 BASIC METABOLIC PNL TOTAL CA: CPT

## 2018-01-26 PROCEDURE — 63600175 PHARM REV CODE 636 W HCPCS: Performed by: NURSE PRACTITIONER

## 2018-01-26 PROCEDURE — 25000003 PHARM REV CODE 250: Performed by: NURSE PRACTITIONER

## 2018-01-26 PROCEDURE — 21400001 HC TELEMETRY ROOM

## 2018-01-26 PROCEDURE — 25000003 PHARM REV CODE 250

## 2018-01-26 PROCEDURE — 63600175 PHARM REV CODE 636 W HCPCS

## 2018-01-26 RX ORDER — POTASSIUM CHLORIDE 20 MEQ/1
40 TABLET, EXTENDED RELEASE ORAL ONCE
Status: COMPLETED | OUTPATIENT
Start: 2018-01-26 | End: 2018-01-26

## 2018-01-26 RX ADMIN — LORAZEPAM 1 MG: 1 TABLET ORAL at 09:01

## 2018-01-26 RX ADMIN — OLANZAPINE 20 MG: 5 TABLET, FILM COATED ORAL at 08:01

## 2018-01-26 RX ADMIN — ATORVASTATIN CALCIUM 20 MG: 10 TABLET, FILM COATED ORAL at 09:01

## 2018-01-26 RX ADMIN — ARFORMOTEROL TARTRATE 15 MCG: 15 SOLUTION RESPIRATORY (INHALATION) at 08:01

## 2018-01-26 RX ADMIN — HYDROXYZINE HYDROCHLORIDE 25 MG: 25 TABLET, FILM COATED ORAL at 03:01

## 2018-01-26 RX ADMIN — CYCLOBENZAPRINE HYDROCHLORIDE 10 MG: 10 TABLET, FILM COATED ORAL at 10:01

## 2018-01-26 RX ADMIN — IPRATROPIUM BROMIDE AND ALBUTEROL SULFATE 3 ML: .5; 3 SOLUTION RESPIRATORY (INHALATION) at 03:01

## 2018-01-26 RX ADMIN — AZITHROMYCIN MONOHYDRATE 500 MG: 500 INJECTION, POWDER, LYOPHILIZED, FOR SOLUTION INTRAVENOUS at 08:01

## 2018-01-26 RX ADMIN — SODIUM CHLORIDE: 0.9 INJECTION, SOLUTION INTRAVENOUS at 05:01

## 2018-01-26 RX ADMIN — HYDROXYZINE HYDROCHLORIDE 25 MG: 25 TABLET, FILM COATED ORAL at 10:01

## 2018-01-26 RX ADMIN — METOPROLOL TARTRATE 50 MG: 50 TABLET ORAL at 09:01

## 2018-01-26 RX ADMIN — MONTELUKAST SODIUM 10 MG: 10 TABLET, COATED ORAL at 08:01

## 2018-01-26 RX ADMIN — GABAPENTIN 300 MG: 300 CAPSULE ORAL at 09:01

## 2018-01-26 RX ADMIN — METOPROLOL TARTRATE 50 MG: 50 TABLET ORAL at 08:01

## 2018-01-26 RX ADMIN — ENOXAPARIN SODIUM 40 MG: 100 INJECTION SUBCUTANEOUS at 06:01

## 2018-01-26 RX ADMIN — SODIUM CHLORIDE: 0.9 INJECTION, SOLUTION INTRAVENOUS at 01:01

## 2018-01-26 RX ADMIN — LEVOTHYROXINE SODIUM 112 MCG: 112 TABLET ORAL at 06:01

## 2018-01-26 RX ADMIN — ARFORMOTEROL TARTRATE 15 MCG: 15 SOLUTION RESPIRATORY (INHALATION) at 07:01

## 2018-01-26 RX ADMIN — FLUTICASONE PROPIONATE 50 MCG: 50 SPRAY, METERED NASAL at 09:01

## 2018-01-26 RX ADMIN — GABAPENTIN 300 MG: 300 CAPSULE ORAL at 01:01

## 2018-01-26 RX ADMIN — IPRATROPIUM BROMIDE AND ALBUTEROL SULFATE 3 ML: .5; 3 SOLUTION RESPIRATORY (INHALATION) at 11:01

## 2018-01-26 RX ADMIN — HYDROXYZINE HYDROCHLORIDE 25 MG: 25 TABLET, FILM COATED ORAL at 06:01

## 2018-01-26 RX ADMIN — IPRATROPIUM BROMIDE AND ALBUTEROL SULFATE 3 ML: .5; 3 SOLUTION RESPIRATORY (INHALATION) at 08:01

## 2018-01-26 RX ADMIN — HYDROCHLOROTHIAZIDE 25 MG: 25 TABLET ORAL at 09:01

## 2018-01-26 RX ADMIN — BUPROPION HYDROCHLORIDE 300 MG: 150 TABLET, FILM COATED, EXTENDED RELEASE ORAL at 09:01

## 2018-01-26 RX ADMIN — PANTOPRAZOLE SODIUM 40 MG: 40 TABLET, DELAYED RELEASE ORAL at 10:01

## 2018-01-26 RX ADMIN — GABAPENTIN 300 MG: 300 CAPSULE ORAL at 06:01

## 2018-01-26 RX ADMIN — IPRATROPIUM BROMIDE AND ALBUTEROL SULFATE 3 ML: .5; 3 SOLUTION RESPIRATORY (INHALATION) at 12:01

## 2018-01-26 RX ADMIN — VALPROIC ACID 500 MG: 250 CAPSULE, LIQUID FILLED ORAL at 09:01

## 2018-01-26 RX ADMIN — IPRATROPIUM BROMIDE AND ALBUTEROL SULFATE 3 ML: .5; 3 SOLUTION RESPIRATORY (INHALATION) at 07:01

## 2018-01-26 RX ADMIN — VALPROIC ACID 500 MG: 250 CAPSULE, LIQUID FILLED ORAL at 08:01

## 2018-01-26 RX ADMIN — POTASSIUM CHLORIDE 40 MEQ: 1500 TABLET, EXTENDED RELEASE ORAL at 01:01

## 2018-01-26 RX ADMIN — OXYBUTYNIN CHLORIDE 5 MG: 5 TABLET ORAL at 08:01

## 2018-01-26 RX ADMIN — CEFTRIAXONE 1 G: 1 INJECTION, SOLUTION INTRAVENOUS at 06:01

## 2018-01-26 RX ADMIN — OXYBUTYNIN CHLORIDE 5 MG: 5 TABLET ORAL at 09:01

## 2018-01-26 NOTE — ASSESSMENT & PLAN NOTE
--CXR shows Vascular congestion.  Left lower lobe infiltrate  --trend out lactate  --BC pending  --Sputum culture ordered  --rocephin and azithromycin given in ED  --IV rocephin and IV azithromycin  --duonebs and brovana  --supplemental oxygen to maintain sats  --incentive spirometry  - Improving, patient con likely discharge home tomorrow if she continunes to improve

## 2018-01-26 NOTE — PLAN OF CARE
Problem: Patient Care Overview  Goal: Plan of Care Review  Outcome: Ongoing (interventions implemented as appropriate)  Shift assessment completed. Patient updated on POC for the day, denies pain, sob. IVF infusing: NS at 125ml/hr. Neuro: alert & oriented x4. CVR: Continuous telemetry monitoring maintained, patient NSR, HR= 81-90. O2 @ 3L, Sats=93%. Reviewed fall precautions with patient and bed alarm on. Bed is low & locked. Call light is within reach. Will continue to monitor.

## 2018-01-26 NOTE — SUBJECTIVE & OBJECTIVE
Interval History: The patient reports that she is feeling better today. Lactic acid has trended down. K+ 3.1 this morning, will replete. The patient looks better today, may be able to discharge tomorrow if she continues to improve.       Review of Systems   Constitutional: Positive for chills and fatigue. Negative for activity change, appetite change, diaphoresis, fever and unexpected weight change.   HENT: Positive for congestion, postnasal drip, sinus pain and sinus pressure. Negative for drooling, facial swelling, rhinorrhea, sneezing and sore throat.    Eyes: Negative for discharge, redness, itching and visual disturbance.   Respiratory: Positive for cough and shortness of breath. Negative for apnea, choking, chest tightness, wheezing and stridor.    Cardiovascular: Negative for chest pain, palpitations and leg swelling.   Gastrointestinal: Positive for constipation, nausea and vomiting. Negative for abdominal distention, abdominal pain, anal bleeding, blood in stool and diarrhea.   Endocrine: Negative for polydipsia, polyphagia and polyuria.   Genitourinary: Negative for decreased urine volume, difficulty urinating, dysuria, frequency, hematuria, pelvic pain, urgency, vaginal bleeding and vaginal discharge.   Musculoskeletal: Positive for arthralgias (to left side that is generalized). Negative for back pain, gait problem, joint swelling, myalgias, neck pain and neck stiffness.   Skin: Negative for color change, pallor, rash and wound.   Allergic/Immunologic: Positive for environmental allergies.   Neurological: Positive for dizziness and weakness. Negative for seizures, facial asymmetry, speech difficulty, light-headedness, numbness and headaches.   Psychiatric/Behavioral: Negative for agitation, behavioral problems, confusion, hallucinations and suicidal ideas.   All other systems reviewed and are negative.    Objective:     Vital Signs (Most Recent):  Temp: 98.2 °F (36.8 °C) (01/26/18 1547)  Pulse: 73  (01/26/18 1547)  Resp: 20 (01/26/18 1547)  BP: (!) 163/66 (01/26/18 1547)  SpO2: (!) 91 % (01/26/18 1547) Vital Signs (24h Range):  Temp:  [97.4 °F (36.3 °C)-98.9 °F (37.2 °C)] 98.2 °F (36.8 °C)  Pulse:  [66-90] 73  Resp:  [16-20] 20  SpO2:  [91 %-96 %] 91 %  BP: (119-163)/(60-80) 163/66     Weight: 117 kg (257 lb 15 oz)  Body mass index is 42.92 kg/m².    Intake/Output Summary (Last 24 hours) at 01/26/18 1741  Last data filed at 01/26/18 0045   Gross per 24 hour   Intake             1000 ml   Output                0 ml   Net             1000 ml      Physical Exam   Constitutional: She is oriented to person, place, and time. She appears well-developed and well-nourished.   HENT:   Head: Normocephalic and atraumatic.   Mouth/Throat: Oropharynx is clear and moist.   Eyes: Conjunctivae and EOM are normal. Pupils are equal, round, and reactive to light.   Neck: Normal range of motion. Neck supple. No JVD present.   Cardiovascular: Normal rate, regular rhythm, normal heart sounds and intact distal pulses.  Exam reveals no gallop and no friction rub.    No murmur heard.  Pulmonary/Chest: Breath sounds normal. She has no wheezes.   SOB with talking   Abdominal: Soft. Bowel sounds are normal. She exhibits no distension. There is no tenderness.   Genitourinary:   Genitourinary Comments: deferred   Musculoskeletal: Normal range of motion. She exhibits no edema, tenderness or deformity.   Neurological: She is alert and oriented to person, place, and time. She has normal reflexes. No cranial nerve deficit.   Skin: Skin is warm and dry. Capillary refill takes 2 to 3 seconds. No erythema.   Psychiatric: She has a normal mood and affect. Her behavior is normal. Judgment and thought content normal.   Nursing note and vitals reviewed.      Significant Labs: All pertinent labs within the past 24 hours have been reviewed.    Significant Imaging:   Imaging Results          X-Ray Chest PA And Lateral (Final result)  Result time 01/24/18  17:44:56    Final result by Robert Flanagan Jr., MD (01/24/18 17:44:56)                 Impression:          Vascular congestion.  Left lower lobe infiltrate      Electronically signed by: ROBERT FLANAGAN MD  Date:     01/24/18  Time:    17:44              Narrative:    EXAM:   XR CHEST PA AND LATERAL    CLINICAL HISTORY:  Chest Pain      COMPARISON:  08/02/2015    FINDINGS:   Heart size is normal.  Central pulmonary vascular congestion.  Hiatal hernia.. No large effusion.  Possible developing left lower lobe/retrocardiac alveolar density/infiltrate..

## 2018-01-26 NOTE — ASSESSMENT & PLAN NOTE
--IV steroids given in ED  --duonebs & brovana  --supplemental oxygen to maintain sats  --IV rocephin and IV azithromycin  - Home nebulizer ordered

## 2018-01-26 NOTE — PROGRESS NOTES
[facility] Hospital Medicine  Progress Note    Patient Name: Esperanza Aquino  MRN: [id number]  Patient Class: IP- Inpatient   Admission Date: 1/24/2018  Length of Stay: 2 days  Attending Physician: Kip Olsen MD  Primary Care Provider: JOVANNI Figueroa MD        Subjective:     Principal Problem:Pneumonia due to infectious organism    HPI:  58 y/o female presented to ED with c/o SOB that onset gradually 2-3 weeks ago. Associated symptoms include productive cough, left side pain, and chills. No mitigating or relieving factors. Pt denies fever, Cp, wheezes, ankle edema, n/v, and all other symptoms at present. RR 56, PO2 50 on Ra, PCO2 48.5. Pt with prod cough with thick, green sputum. Lactate 2.9. CXR revealed LLL infiltrate. Pt will be admitted to telemetry for Pneumonia and respiratory failure with hypoxia under the care of [facility] Medicine.     Hospital Course:  1/25/18 The patient reports that symptoms have somewhat improved overnight. Her lactic acid increased to 4.9. Aggressive fluid resuscitation started. Lactic acid trended down. Continue current management.   1/26/18 The patient reports that she is feeling better today. Lactic acid has trended down. K+ 3.1 this morning, will replete. The patient looks better today, may be able to discharge tomorrow if she continues to improve.     Interval History: The patient reports that she is feeling better today. Lactic acid has trended down. K+ 3.1 this morning, will replete. The patient looks better today, may be able to discharge tomorrow if she continues to improve.       Review of Systems   Constitutional: Positive for chills and fatigue. Negative for activity change, appetite change, diaphoresis, fever and unexpected weight change.   HENT: Positive for congestion, postnasal drip, sinus pain and sinus pressure. Negative for drooling, facial swelling, rhinorrhea, sneezing and sore throat.    Eyes: Negative for discharge, redness, itching and  visual disturbance.   Respiratory: Positive for cough and shortness of breath. Negative for apnea, choking, chest tightness, wheezing and stridor.    Cardiovascular: Negative for chest pain, palpitations and leg swelling.   Gastrointestinal: Positive for constipation, nausea and vomiting. Negative for abdominal distention, abdominal pain, anal bleeding, blood in stool and diarrhea.   Endocrine: Negative for polydipsia, polyphagia and polyuria.   Genitourinary: Negative for decreased urine volume, difficulty urinating, dysuria, frequency, hematuria, pelvic pain, urgency, vaginal bleeding and vaginal discharge.   Musculoskeletal: Positive for arthralgias (to left side that is generalized). Negative for back pain, gait problem, joint swelling, myalgias, neck pain and neck stiffness.   Skin: Negative for color change, pallor, rash and wound.   Allergic/Immunologic: Positive for environmental allergies.   Neurological: Positive for dizziness and weakness. Negative for seizures, facial asymmetry, speech difficulty, light-headedness, numbness and headaches.   Psychiatric/Behavioral: Negative for agitation, behavioral problems, confusion, hallucinations and suicidal ideas.   All other systems reviewed and are negative.    Objective:     Vital Signs (Most Recent):  Temp: 98.2 °F (36.8 °C) (01/26/18 1547)  Pulse: 73 (01/26/18 1547)  Resp: 20 (01/26/18 1547)  BP: (!) 163/66 (01/26/18 1547)  SpO2: (!) 91 % (01/26/18 1547) Vital Signs (24h Range):  Temp:  [97.4 °F (36.3 °C)-98.9 °F (37.2 °C)] 98.2 °F (36.8 °C)  Pulse:  [66-90] 73  Resp:  [16-20] 20  SpO2:  [91 %-96 %] 91 %  BP: (119-163)/(60-80) 163/66     Weight: 117 kg (257 lb 15 oz)  Body mass index is 42.92 kg/m².    Intake/Output Summary (Last 24 hours) at 01/26/18 1741  Last data filed at 01/26/18 0045   Gross per 24 hour   Intake             1000 ml   Output                0 ml   Net             1000 ml      Physical Exam   Constitutional: She is oriented to person,  place, and time. She appears well-developed and well-nourished.   HENT:   Head: Normocephalic and atraumatic.   Mouth/Throat: Oropharynx is clear and moist.   Eyes: Conjunctivae and EOM are normal. Pupils are equal, round, and reactive to light.   Neck: Normal range of motion. Neck supple. No JVD present.   Cardiovascular: Normal rate, regular rhythm, normal heart sounds and intact distal pulses.  Exam reveals no gallop and no friction rub.    No murmur heard.  Pulmonary/Chest: Breath sounds normal. She has no wheezes.   SOB with talking   Abdominal: Soft. Bowel sounds are normal. She exhibits no distension. There is no tenderness.   Genitourinary:   Genitourinary Comments: deferred   Musculoskeletal: Normal range of motion. She exhibits no edema, tenderness or deformity.   Neurological: She is alert and oriented to person, place, and time. She has normal reflexes. No cranial nerve deficit.   Skin: Skin is warm and dry. Capillary refill takes 2 to 3 seconds. No erythema.   Psychiatric: She has a normal mood and affect. Her behavior is normal. Judgment and thought content normal.   Nursing note and vitals reviewed.      Significant Labs: All pertinent labs within the past 24 hours have been reviewed.    Significant Imaging:   Imaging Results          X-Ray Chest PA And Lateral (Final result)  Result time 01/24/18 17:44:56    Final result by Robert Flanagan Jr., MD (01/24/18 17:44:56)                 Impression:          Vascular congestion.  Left lower lobe infiltrate      Electronically signed by: ROBERT FLANAGAN MD  Date:     01/24/18  Time:    17:44              Narrative:    EXAM:   XR CHEST PA AND LATERAL    CLINICAL HISTORY:  Chest Pain      COMPARISON:  08/02/2015    FINDINGS:   Heart size is normal.  Central pulmonary vascular congestion.  Hiatal hernia.. No large effusion.  Possible developing left lower lobe/retrocardiac alveolar density/infiltrate..                                 Assessment/Plan:      *  Pneumonia due to infectious organism    --CXR shows Vascular congestion.  Left lower lobe infiltrate  --trend out lactate  --BC pending  --Sputum culture ordered  --rocephin and azithromycin given in ED  --IV rocephin and IV azithromycin  --ruel and brovana  --supplemental oxygen to maintain sats  --incentive spirometry  - Improving, patient con likely discharge home tomorrow if she continunes to improve                 Sepsis    Blood cx pending   Continue IV ABX  Trend lactic acid   Fluid resuscitation           Tobacco abuse    --cessation counseling  --refuses nicotine patch          Essential hypertension    --continue home Hctz & lopressor  --cardiac diet          Hyperlipidemia    --continue home atorvastatin  --cardiac diet          Bipolar disorder, current episode mixed, mild    --continue home bupropion, hydroxyzine, Zyprexa, valproic acid, and lorazempam.  --pt reports she does not take latuda          Hypothyroid    --continue home levothyroxine        Chronic obstructive pulmonary disease with acute lower respiratory infection    --IV steroids given in ED  --ruel Tracy brovana  --supplemental oxygen to maintain sats  --IV rocephin and IV azithromycin  - Home nebulizer ordered          Acute on chronic respiratory failure with hypoxia    --RR >40  --PO2 50 on RA  --supplemental oxygen to maintain sats  --duo helga and brovana              VTE Risk Mitigation         Ordered     enoxaparin injection 40 mg  Daily     Route:  Subcutaneous        01/24/18 2118     Medium Risk of VTE  Once      01/24/18 2118     Place sequential compression device  Until discontinued      01/24/18 1908              Chicho Etienne NP  Department of Hospital Medicine   [facility]

## 2018-01-27 VITALS
HEIGHT: 65 IN | OXYGEN SATURATION: 95 % | BODY MASS INDEX: 43.41 KG/M2 | SYSTOLIC BLOOD PRESSURE: 143 MMHG | HEART RATE: 78 BPM | DIASTOLIC BLOOD PRESSURE: 70 MMHG | RESPIRATION RATE: 20 BRPM | WEIGHT: 260.56 LBS | TEMPERATURE: 99 F

## 2018-01-27 LAB
ANION GAP SERPL CALC-SCNC: 13 MMOL/L
BASOPHILS # BLD AUTO: 0.04 K/UL
BASOPHILS NFR BLD: 0.4 %
BUN SERPL-MCNC: 7 MG/DL
CALCIUM SERPL-MCNC: 8.6 MG/DL
CHLORIDE SERPL-SCNC: 102 MMOL/L
CO2 SERPL-SCNC: 26 MMOL/L
CREAT SERPL-MCNC: 0.7 MG/DL
DIFFERENTIAL METHOD: ABNORMAL
EOSINOPHIL # BLD AUTO: 0.1 K/UL
EOSINOPHIL NFR BLD: 1.4 %
ERYTHROCYTE [DISTWIDTH] IN BLOOD BY AUTOMATED COUNT: 14.5 %
EST. GFR  (AFRICAN AMERICAN): >60 ML/MIN/1.73 M^2
EST. GFR  (NON AFRICAN AMERICAN): >60 ML/MIN/1.73 M^2
GLUCOSE SERPL-MCNC: 88 MG/DL
HCT VFR BLD AUTO: 39.8 %
HGB BLD-MCNC: 12.3 G/DL
LYMPHOCYTES # BLD AUTO: 3.3 K/UL
LYMPHOCYTES NFR BLD: 35.4 %
MCH RBC QN AUTO: 29.1 PG
MCHC RBC AUTO-ENTMCNC: 30.9 G/DL
MCV RBC AUTO: 94 FL
MONOCYTES # BLD AUTO: 0.5 K/UL
MONOCYTES NFR BLD: 5.5 %
NEUTROPHILS # BLD AUTO: 5.3 K/UL
NEUTROPHILS NFR BLD: 57.3 %
PLATELET # BLD AUTO: 253 K/UL
PMV BLD AUTO: 9.9 FL
POTASSIUM SERPL-SCNC: 4 MMOL/L
RBC # BLD AUTO: 4.23 M/UL
SODIUM SERPL-SCNC: 141 MMOL/L
WBC # BLD AUTO: 9.31 K/UL

## 2018-01-27 PROCEDURE — 36415 COLL VENOUS BLD VENIPUNCTURE: CPT

## 2018-01-27 PROCEDURE — 94640 AIRWAY INHALATION TREATMENT: CPT

## 2018-01-27 PROCEDURE — 27000221 HC OXYGEN, UP TO 24 HOURS

## 2018-01-27 PROCEDURE — 85025 COMPLETE CBC W/AUTO DIFF WBC: CPT

## 2018-01-27 PROCEDURE — 25000003 PHARM REV CODE 250: Performed by: NURSE PRACTITIONER

## 2018-01-27 PROCEDURE — 94761 N-INVAS EAR/PLS OXIMETRY MLT: CPT

## 2018-01-27 PROCEDURE — 99900035 HC TECH TIME PER 15 MIN (STAT)

## 2018-01-27 PROCEDURE — 94799 UNLISTED PULMONARY SVC/PX: CPT

## 2018-01-27 PROCEDURE — 25000003 PHARM REV CODE 250: Performed by: INTERNAL MEDICINE

## 2018-01-27 PROCEDURE — 80048 BASIC METABOLIC PNL TOTAL CA: CPT

## 2018-01-27 PROCEDURE — 25000242 PHARM REV CODE 250 ALT 637 W/ HCPCS: Performed by: NURSE PRACTITIONER

## 2018-01-27 RX ORDER — IPRATROPIUM BROMIDE AND ALBUTEROL SULFATE 2.5; .5 MG/3ML; MG/3ML
3 SOLUTION RESPIRATORY (INHALATION) EVERY 4 HOURS PRN
Qty: 1 BOX | Refills: 0 | Status: ON HOLD | OUTPATIENT
Start: 2018-01-27 | End: 2019-04-05 | Stop reason: SDUPTHER

## 2018-01-27 RX ORDER — AZITHROMYCIN 250 MG/1
500 TABLET, FILM COATED ORAL DAILY
Status: DISCONTINUED | OUTPATIENT
Start: 2018-01-27 | End: 2018-01-27 | Stop reason: HOSPADM

## 2018-01-27 RX ORDER — LEVOFLOXACIN 500 MG/1
500 TABLET, FILM COATED ORAL DAILY
Qty: 7 TABLET | Refills: 0 | Status: SHIPPED | OUTPATIENT
Start: 2018-01-27 | End: 2018-01-27

## 2018-01-27 RX ORDER — IPRATROPIUM BROMIDE AND ALBUTEROL SULFATE 2.5; .5 MG/3ML; MG/3ML
3 SOLUTION RESPIRATORY (INHALATION) EVERY 4 HOURS PRN
Qty: 1 BOX | Refills: 0 | Status: SHIPPED | OUTPATIENT
Start: 2018-01-27 | End: 2018-01-27

## 2018-01-27 RX ORDER — LEVOFLOXACIN 500 MG/1
500 TABLET, FILM COATED ORAL DAILY
Qty: 7 TABLET | Refills: 0 | Status: SHIPPED | OUTPATIENT
Start: 2018-01-27 | End: 2018-02-03

## 2018-01-27 RX ADMIN — CYCLOBENZAPRINE HYDROCHLORIDE 10 MG: 10 TABLET, FILM COATED ORAL at 09:01

## 2018-01-27 RX ADMIN — IPRATROPIUM BROMIDE AND ALBUTEROL SULFATE 3 ML: .5; 3 SOLUTION RESPIRATORY (INHALATION) at 04:01

## 2018-01-27 RX ADMIN — HYDROXYZINE HYDROCHLORIDE 25 MG: 25 TABLET, FILM COATED ORAL at 03:01

## 2018-01-27 RX ADMIN — LEVOTHYROXINE SODIUM 112 MCG: 112 TABLET ORAL at 05:01

## 2018-01-27 RX ADMIN — IPRATROPIUM BROMIDE AND ALBUTEROL SULFATE 3 ML: .5; 3 SOLUTION RESPIRATORY (INHALATION) at 12:01

## 2018-01-27 RX ADMIN — FLUTICASONE PROPIONATE 50 MCG: 50 SPRAY, METERED NASAL at 09:01

## 2018-01-27 RX ADMIN — IPRATROPIUM BROMIDE AND ALBUTEROL SULFATE 3 ML: .5; 3 SOLUTION RESPIRATORY (INHALATION) at 08:01

## 2018-01-27 RX ADMIN — METOPROLOL TARTRATE 50 MG: 50 TABLET ORAL at 09:01

## 2018-01-27 RX ADMIN — BUPROPION HYDROCHLORIDE 300 MG: 150 TABLET, FILM COATED, EXTENDED RELEASE ORAL at 09:01

## 2018-01-27 RX ADMIN — GABAPENTIN 300 MG: 300 CAPSULE ORAL at 05:01

## 2018-01-27 RX ADMIN — ATORVASTATIN CALCIUM 20 MG: 10 TABLET, FILM COATED ORAL at 09:01

## 2018-01-27 RX ADMIN — SODIUM CHLORIDE: 0.9 INJECTION, SOLUTION INTRAVENOUS at 05:01

## 2018-01-27 RX ADMIN — ARFORMOTEROL TARTRATE 15 MCG: 15 SOLUTION RESPIRATORY (INHALATION) at 08:01

## 2018-01-27 RX ADMIN — OXYBUTYNIN CHLORIDE 5 MG: 5 TABLET ORAL at 09:01

## 2018-01-27 RX ADMIN — HYDROCHLOROTHIAZIDE 25 MG: 25 TABLET ORAL at 09:01

## 2018-01-27 RX ADMIN — LORAZEPAM 1 MG: 1 TABLET ORAL at 09:01

## 2018-01-27 RX ADMIN — GABAPENTIN 300 MG: 300 CAPSULE ORAL at 03:01

## 2018-01-27 RX ADMIN — VALPROIC ACID 500 MG: 250 CAPSULE, LIQUID FILLED ORAL at 09:01

## 2018-01-27 RX ADMIN — HYDROXYZINE HYDROCHLORIDE 25 MG: 25 TABLET, FILM COATED ORAL at 09:01

## 2018-01-27 RX ADMIN — PANTOPRAZOLE SODIUM 40 MG: 40 TABLET, DELAYED RELEASE ORAL at 09:01

## 2018-01-27 NOTE — PROGRESS NOTES
Pharmacist Intervention IV to PO Note    Esperanza Aquino is a 57 y.o. female being treated with IV medication, Azithromycin 500 mg every 24 hours    Patient Data:    Vital Signs (Most Recent):  Temp: 98.4 °F (36.9 °C) (01/27/18 0343)  Pulse: 77 (01/27/18 0417)  Resp: 18 (01/27/18 0417)  BP: 136/84 (01/27/18 0343)  SpO2: (!) 94 % (01/27/18 0417)   Vital Signs (72h Range):  Temp:  [97.3 °F (36.3 °C)-98.9 °F (37.2 °C)]   Pulse:  []   Resp:  [14-43]   BP: ()/(55-96)   SpO2:  [84 %-96 %]      CBC:    Recent Labs     Lab 01/25/18  0619 01/26/18  0516 01/27/18  0509   WBC 10.61 11.22 9.31   RBC 4.50 4.10 4.23   HGB 13.3 12.0 12.3   HCT 41.0 38.2 39.8    294 253   MCV 91 93 94   MCH 29.6 29.3 29.1   MCHC 32.4 31.4* 30.9*     CMP:     Recent Labs     Lab 01/24/18  1711 01/25/18  0619 01/26/18  0516 01/27/18  0509   * 230* 135* 88   CALCIUM 9.5 9.1 8.3* 8.6*   ALBUMIN 3.4* --  --  --    PROT 8.0 --  --  --     135* 142 141   K 2.7* 3.5 3.1* 4.0   CO2 32* 25 31* 26   CL 90* 97 102 102   BUN 8 11 6 7   CREATININE 0.8 0.8 0.7 0.7   ALKPHOS 69 --  --  --    ALT 9* --  --  --    AST 12 --  --  --    BILITOT 0.3 --  --  --        Dietary Orders:  Diet Orders            Diet Cardiac: Cardiac starting at 01/24 2119            Based on the following criteria, this patient qualifies for intravenous to oral conversion:  [x] The patients gastrointestinal tract is functioning (tolerating medications via oral or enteral route for 24 hours and tolerating food or enteral feeds for 24 hours).  [x] The patient is hemodynamically stable for 24 hours (heart rate <100 beats per minute, systolic blood pressure >99 mm Hg, and respiratory rate <20 breaths per minute).  [x] The patient shows clinical improvement (afebrile for at least 24 hours and white blood cell count downtrending or normalized). Additionally, the patient must be non-neutropenic (absolute neutrophil count >500 cells/mm3).  [x] For antimicrobials, the  patient has received IV therapy for at least 24 hours.    IV medication Azithromycin 500 mg IVPB will be changed to oral medication Azithromycin 500 mg tablets    Pharmacist's Name: Cristo Foote  Pharmacist's Extension: [phone]

## 2018-01-27 NOTE — CONSULTS
Nebulizer and O2 delivered to patient's bedside.  Appropriate paperwork signed by patient.  Patient advised that [facility] technician would deliver a concentrator to her home.  Permission to pull DME from [facility] obtained from [facility] on-call technician, Santiago. Orders for both O2 and Nebulizer faxed to Santiago at [phone].  Heather Amaya LMSW, ELSIE-Tere, CCM  1/27/2018

## 2018-01-27 NOTE — PROGRESS NOTES
Discharge instructions given to pt. Verbalized understanding. Iv removed. Cath tip intact. Tolerated. Tele removed. Home o2 tank in room. Pt put on that. instructedd to call o2 company to notify of going home so they can bring condenser. Pt down via wheelchair to private vehicle. Home with friend.

## 2018-01-27 NOTE — PROGRESS NOTES
Nebulizer and O2 delivered to patient's bedside.  Appropriate paperwork signed by patient.  Patient advised that [facility] technician would deliver a concentrator to her home.  Permission to pull DME from [facility] obtained from [facility] on-call technician, Santiago. Orders for both O2 and Nebulizer faxed to Santiago at [phone].  Heather Amaya LMSW, ELSIE-Tere, CCM  1/27/2018

## 2018-01-27 NOTE — PLAN OF CARE
Problem: Patient Care Overview  Goal: Plan of Care Review  Outcome: Ongoing (interventions implemented as appropriate)  Pt tolerating o2 and tx's well. No distress noted

## 2018-01-27 NOTE — PLAN OF CARE
Problem: Patient Care Overview  Goal: Plan of Care Review  Outcome: Ongoing (interventions implemented as appropriate)  NSR in the 80's during shift. Productive cough. White, thick sputum. VSS.   Fall precautions in place. Side rails up. Bed locked and low in position. Call light and personal items within reach. 24 hour order chart check completed. Pt educated on medication's side effects. Pt verbalized understanding.   Will continue to monitor.

## 2018-01-27 NOTE — PROGRESS NOTES
Home Oxygen Evaluation    Date Performed: 1/27/2018    1) Patient's Home O2 Sat on room air, while at rest: 87        If O2 sats on room air at rest are 88% or below, patient qualifies. No additional testing needed. Document N/A in steps 2 and 3. If 89% or above, complete steps 2.      2) Patient's O2 Sat on room air while exercising: NA        If O2 sats on room air while exercising remain 89% or above patient does not qualify, no further testing needed Document N/A in step 3. If O2 sats on room air while exercising are 88% or below, continue to step 3.      3) Patient's O2 Sat while exercising on O2:  at  LPM         (Must show improvement from #2 for patients to qualify)    If O2 sats improve on oxygen, patient qualifies for portable oxygen. If not, the patient does not qualify.

## 2018-01-27 NOTE — DISCHARGE SUMMARY
[facility] Hospital Medicine  Discharge Summary      Patient Name: Esperanza Aquino  MRN: [id number]  Admission Date: 1/24/2018  Hospital Length of Stay: 3 days  Discharge Date and Time:  01/27/2018 2:09 PM  Attending Physician: Noah Salazar MD   Discharging Provider: Veronica Hendrix NP  Primary Care Provider: JOVANNI Figueroa MD      HPI:   56 y/o female presented to ED with c/o SOB that onset gradually 2-3 weeks ago. Associated symptoms include productive cough, left side pain, and chills. No mitigating or relieving factors. Pt denies fever, Cp, wheezes, ankle edema, n/v, and all other symptoms at present. RR 56, PO2 50 on Ra, PCO2 48.5. Pt with prod cough with thick, green sputum. Lactate 2.9. CXR revealed LLL infiltrate. Pt will be admitted to telemetry for Pneumonia and respiratory failure with hypoxia under the care of Hospital Medicine.     * No surgery found *      Hospital Course:   57 year old female admitted for acute respiratory failure with hypoxia and pneumonia. Patient being treated with MARYAM,LABA, IV Rocephin and po Azithromycin. Her lactic acid increased to 4.9. Aggressive fluid resuscitation started. Continue current medical management. Lactic acid has normalized. Blood cultures show NGTD. Patient qualifies for home oxygen, 87% at rest on RA. Social work consult for DME (nebulizer and oxygen). Symptoms improved. Case discussed with Dr. Salazar. Home meds reconciled. Home oxygen and neb machine at bedside. New prescriptions for duonebs and Levaquin given. Patient to follow-up with PCP in 3-5 days for hospital follow-up. Patient seen and examined on the date of discharge and found to be suitable for discharge.      Consults:   Consults         Status Ordering Provider     Inpatient consult to Social Work  Once     Provider:  (Not yet assigned)    Completed GEORGIA HARGROVE     Inpatient consult to Social Work  Once     Provider:  (Not yet assigned)    Completed JUNE  "VANNA Varela new Assessment & Plan notes have been filed under this hospital service since the last note was generated.  Service: Hospital Medicine    Final Active Diagnoses:    Diagnosis Date Noted POA    PRINCIPAL PROBLEM:  Pneumonia due to infectious organism [J18.9] 01/24/2018 Yes    Sepsis [A41.9] 01/25/2018 Unknown    Acute on chronic respiratory failure with hypoxia [J96.21] 01/24/2018 Yes    Chronic obstructive pulmonary disease with acute lower respiratory infection [J44.0] 01/24/2018 Yes    Hypothyroid [E03.9] 01/24/2018 Yes    Bipolar disorder, current episode mixed, mild [F31.61] 01/24/2018 Yes    Hyperlipidemia [E78.5] 01/24/2018 Yes    Essential hypertension [I10] 01/24/2018 Yes    Tobacco abuse [Z72.0] 01/24/2018 Yes      Problems Resolved During this Admission:    Diagnosis Date Noted Date Resolved POA    Severe sepsis [A41.9, R65.20] 01/25/2018 01/25/2018 Unknown       Discharged Condition: stable    Disposition:     Follow Up:  Follow-up Information     [facility].    Specialties:  Wound Care, Home Health Services  Why:  Home Health           C Inocencia Figueroa MD In 3 days.    Specialty:  Internal Medicine  Why:  hospital follow-up in 3 -5 days                Patient Instructions:     NEBULIZER FOR HOME USE   Order Specific Question Answer Comments   Height: 5' 5" (1.651 m)    Weight: 112 kg (246 lb 14.6 oz)    Does patient have medical equipment at home? none    Length of need (1-99 months): 99      NEBULIZER FOR HOME USE   Order Specific Question Answer Comments   Height: 5' 5" (1.651 m)    Weight: 118.2 kg (260 lb 9.3 oz)    Does patient have medical equipment at home? none    Length of need (1-99 months): 99      OXYGEN FOR HOME USE   Order Specific Question Answer Comments   Liter Flow 2    Duration Continuous    Qualifying SpO2: 87    Testing done at: Rest    Route nasal cannula    Portable mode: pulse dose acceptable    Device home concentrator with " "portable unit    Patient condition with qualifying saturation COPD    Height: 5' 5" (1.651 m)    Weight: 118.2 kg (260 lb 9.3 oz)    Does patient have medical equipment at home? none    Alternative treatment measures have been tried or considered and deemed clinically ineffective. Yes          Significant Diagnostic Studies: Labs:   BMP:   Recent Labs  Lab 01/26/18  0516 01/27/18  0509   * 88    141   K 3.1* 4.0    102   CO2 31* 26   BUN 6 7   CREATININE 0.7 0.7   CALCIUM 8.3* 8.6*   , CMP   Recent Labs  Lab 01/26/18  0516 01/27/18  0509    141   K 3.1* 4.0    102   CO2 31* 26   * 88   BUN 6 7   CREATININE 0.7 0.7   CALCIUM 8.3* 8.6*   ANIONGAP 9 13   ESTGFRAFRICA >60 >60   EGFRNONAA >60 >60   , CBC   Recent Labs  Lab 01/26/18  0516 01/27/18  0509   WBC 11.22 9.31   HGB 12.0 12.3   HCT 38.2 39.8    253    and All labs within the past 24 hours have been reviewed    Pending Diagnostic Studies:     None         Medications:  Reconciled Home Medications:   Current Discharge Medication List      START taking these medications    Details   albuterol-ipratropium 2.5mg-0.5mg/3mL (DUO-NEB) 0.5 mg-3 mg(2.5 mg base)/3 mL nebulizer solution Take 3 mLs by nebulization every 4 (four) hours as needed for Wheezing. Rescue  Qty: 1 Box, Refills: 0      levoFLOXacin (LEVAQUIN) 500 MG tablet Take 1 tablet (500 mg total) by mouth once daily.  Qty: 7 tablet, Refills: 0         CONTINUE these medications which have NOT CHANGED    Details   ALBUTEROL SULFATE (VENTOLIN HFA INHL) Inhale into the lungs.      atorvastatin (LIPITOR) 20 MG tablet Take 20 mg by mouth once daily.      buPROPion (WELLBUTRIN XL) 300 MG 24 hr tablet Take 300 mg by mouth once daily.      cyclobenzaprine (FLEXERIL) 10 MG tablet Take 10 mg by mouth 2 (two) times daily as needed for Muscle spasms.      ferrous gluconate (FERGON) 324 MG tablet Take 1 tablet (324 mg total) by mouth 2 (two) times daily with meals.    "   fluticasone (FLONASE) 50 mcg/actuation nasal spray 1 spray by Each Nare route once daily.      fluticasone-vilanterol (BREO ELLIPTA) 200-25 mcg/dose DsDv diskus inhaler Inhale 1 puff into the lungs once daily. Controller      gabapentin (NEURONTIN) 300 MG capsule Take 300 mg by mouth 3 (three) times daily.      hydroCHLOROthiazide (HYDRODIURIL) 25 MG tablet Take 25 mg by mouth once daily.      hydrOXYzine HCl (ATARAX) 25 MG tablet Take 25 mg by mouth 4 (four) times daily.      levothyroxine (SYNTHROID) 112 MCG tablet Take 112 mcg by mouth once daily.      lorazepam (ATIVAN) 1 MG tablet Take 1 mg by mouth every 6 (six) hours as needed for Anxiety.      lurasidone (LATUDA) 40 mg Tab tablet Take 80 mg by mouth once daily.      metoprolol tartrate (LOPRESSOR) 50 MG tablet Take 50 mg by mouth 2 (two) times daily.      montelukast (SINGULAIR) 10 mg tablet Take 10 mg by mouth once daily.      OLANZapine (ZYPREXA) 20 MG tablet Take 20 mg by mouth every evening.      omeprazole (PRILOSEC) 40 MG capsule Take 40 mg by mouth once daily.      oxybutynin (DITROPAN) 5 MG Tab Take 5 mg by mouth 2 (two) times daily.      promethazine (PHENERGAN) 25 MG tablet Take 25 mg by mouth every 4 (four) hours.      valproic acid (DEPAKENE) 250 mg capsule Take 500 mg by mouth 2 (two) times daily.          STOP taking these medications       pantoprazole (PROTONIX) 40 MG tablet Comments:   Reason for Stopping:               Indwelling Lines/Drains at time of discharge:   Lines/Drains/Airways          No matching active lines, drains, or airways          Time spent on the discharge of patient: 50 minutes  Patient was seen and examined on the date of discharge and determined to be suitable for discharge.         Veronica Hendrix NP  Department of Hospital Medicine  [facility]

## 2018-01-28 NOTE — PLAN OF CARE
01/27/18 1842   Final Note   Assessment Type Final Discharge Note   Discharge Disposition Home   Right Care Referral Info   Post Acute Recommendation No Care

## 2018-01-29 LAB
BACTERIA SPEC AEROBE CULT: NORMAL
GRAM STN SPEC: NORMAL

## 2018-01-30 LAB
BACTERIA BLD CULT: NORMAL
BACTERIA BLD CULT: NORMAL

## 2018-02-01 ENCOUNTER — HOSPITAL ENCOUNTER (EMERGENCY)
Facility: HOSPITAL | Age: 58
Discharge: HOME | End: 2018-02-01
Attending: EMERGENCY MEDICINE
Payer: MEDICAID

## 2018-02-01 VITALS
OXYGEN SATURATION: 94 % | DIASTOLIC BLOOD PRESSURE: 65 MMHG | TEMPERATURE: 99 F | SYSTOLIC BLOOD PRESSURE: 121 MMHG | RESPIRATION RATE: 18 BRPM | HEIGHT: 65 IN | HEART RATE: 86 BPM

## 2018-02-01 DIAGNOSIS — G89.29 CHRONIC PAIN OF BOTH KNEES: Primary | ICD-10-CM

## 2018-02-01 DIAGNOSIS — F31.61 BIPOLAR DISORDER, CURRENT EPISODE MIXED, MILD: ICD-10-CM

## 2018-02-01 DIAGNOSIS — M25.561 CHRONIC PAIN OF BOTH KNEES: Primary | ICD-10-CM

## 2018-02-01 DIAGNOSIS — M25.562 CHRONIC PAIN OF BOTH KNEES: Primary | ICD-10-CM

## 2018-02-01 PROCEDURE — 25000003 PHARM REV CODE 250: Performed by: EMERGENCY MEDICINE

## 2018-02-01 PROCEDURE — 99283 EMERGENCY DEPT VISIT LOW MDM: CPT

## 2018-02-01 RX ORDER — OXYCODONE AND ACETAMINOPHEN 5; 325 MG/1; MG/1
1 TABLET ORAL ONCE
Status: COMPLETED | OUTPATIENT
Start: 2018-02-01 | End: 2018-02-01

## 2018-02-01 RX ORDER — TRAMADOL HYDROCHLORIDE AND ACETAMINOPHEN 37.5; 325 MG/1; MG/1
1 TABLET, FILM COATED ORAL EVERY 4 HOURS PRN
Qty: 18 TABLET | Refills: 0 | Status: SHIPPED | OUTPATIENT
Start: 2018-02-01 | End: 2018-02-11

## 2018-02-01 RX ADMIN — OXYCODONE HYDROCHLORIDE AND ACETAMINOPHEN 1 TABLET: 5; 325 TABLET ORAL at 09:02

## 2018-02-02 NOTE — ED PROVIDER NOTES
"SCRIBE #1 NOTE: I, Corinne Mack, am scribing for, and in the presence of, John Dobson MD. I have scribed the entire note.      History      Chief Complaint   Patient presents with    Leg Pain     kenan leg pain and swelling onset x few days. recently d/c'd for PNE and resp failure       Review of patient's allergies indicates:   Allergen Reactions    Lithobid [lithium carbonate] Other (See Comments)     Severe depression    Sudafed [pseudoephedrine hcl] Other (See Comments)     Pt states she flat lined    Prednisone Other (See Comments)     Elevated BP    Prozac [fluoxetine] Swelling    Thorazine [chlorpromazine] Swelling        HPI   HPI    2/1/2018, 8:20 PM   History obtained from the patient      History of Present Illness: Espreanza Aquino is a 57 y.o. female patient who presents to the Emergency Department for BLE pain which onset gradually a few days ago. Pt states she "needs surgery" for her knees, but she is afraid to have it. Pt also states that she used to be on norco for her pain, but her PCP can no longer prescribe it. Pt has been "taking codeine", but states that "it doesn't work for her". Symptoms are constant and moderate in severity. Walking exacerbates the pt's sxs. No mitigating factors reported. Associated sxs include bilateral knee pain and bilateral knee swelling. Patient denies any fever, chills, CP, SOB, N/V/D, neck pain, HA, dizziness, and all other sxs at this time. No prior Tx reported outside of her daily medications. Pt has Hx of COPD, HTN, bipolar disorder, chronic back pain, and arthritis. Pt was d/c from this facility on 01/24/18 after tx of pneumonia and respiratory failure. No further complaints or concerns at this time.         Arrival mode: Personal vehicle      PCP: JOVANNI Figueroa MD       Past Medical History:  Past Medical History:   Diagnosis Date    Bipolar 1 disorder     not sure what type    Cancer     Uterine Cancer    COPD (chronic obstructive pulmonary " disease)     Depression     Encounter for blood transfusion     Hypertension     Personal history of peptic ulcer disease     Pneumonia        Past Surgical History:  Past Surgical History:   Procedure Laterality Date    APPENDECTOMY       SECTION      CHOLECYSTECTOMY      HERNIA REPAIR      HYSTERECTOMY           Family History:  Family History   Problem Relation Age of Onset    Lung cancer Father     Cancer Father     Brain cancer Brother     Cancer Brother        Social History:  Social History     Social History Main Topics    Smoking status: Current Every Day Smoker     Packs/day: 1.00     Years: 25.00     Types: Cigarettes     Last attempt to quit: 2015    Smokeless tobacco: Never Used      Comment: Patient now using E-Cigarrettes    Alcohol use No    Drug use: No    Sexual activity: Yes     Partners: Male       ROS   Review of Systems   Constitutional: Negative for chills and fever.   Respiratory: Negative for cough and shortness of breath.    Cardiovascular: Negative for chest pain and leg swelling.   Gastrointestinal: Negative for abdominal pain, diarrhea, nausea and vomiting.   Musculoskeletal: Positive for arthralgias (bilateral knees), back pain (chronic), joint swelling (bilateral knees) and myalgias (BLE). Negative for neck pain and neck stiffness.   Skin: Negative for rash and wound.   Neurological: Negative for dizziness, light-headedness, numbness and headaches.   All other systems reviewed and are negative.    Physical Exam      Initial Vitals [18]   BP Pulse Resp Temp SpO2   125/67 85 20 98.2 °F (36.8 °C) (!) 94 %      MAP       86.33          Physical Exam  Nursing Notes and Vital Signs Reviewed.  Constitutional: Patient is in no apparent distress. Well-developed and well-nourished.  Head: Atraumatic. Normocephalic.  Eyes: PERRL. EOM intact. Conjunctivae are not pale. No scleral icterus.  ENT: Mucous membranes are moist. Oropharynx is clear and symmetric.  "   Neck: Supple. Full ROM. No lymphadenopathy.  Cardiovascular: Regular rate. Regular rhythm. No murmurs, rubs, or gallops. Distal pulses are 2+ and symmetric.  Pulmonary/Chest: No respiratory distress. Clear to auscultation bilaterally. No wheezing or rales.  Abdominal: Soft and non-distended.  There is no tenderness.  No rebound, guarding, or rigidity.  Musculoskeletal: Moves all extremities. No obvious deformities. No edema.   Bilateral Knee:  No obvious deformity. There is no swelling.  There is mild tenderness.  No increased warmth, erythema, induration or fluctuance.  No ligament laxity.  DP and PT pulses are 2+.  Normal capillary refill.  Distal sensation is intact.  Skin: Warm and dry.  Neurological:  Alert, awake, and appropriate.  Normal speech.  No acute focal neurological deficits are appreciated.  Psychiatric: Normal affect. Good eye contact. Appropriate in content.    ED Course    Procedures  ED Vital Signs:  Vitals:    02/01/18 1918 02/01/18 2041   BP: 125/67 119/63   Pulse: 85    Resp: 20    Temp: 98.2 °F (36.8 °C)    TempSrc: Oral    SpO2: (!) 94%    Height: 5' 5" (1.651 m)        Abnormal Lab Results:  Labs Reviewed - No data to display     All Lab Results:  None    Imaging Results:  Imaging Results    None                 The Emergency Provider reviewed the vital signs and test results, which are outlined above.    ED Discussion     9:24 PM: Pt is awake, alert, and in no distress. Discussed pt dx and plan of tx. Gave pt all f/u and return to the ED instructions. All questions and concerns were addressed at this time. Pt expresses understanding of information and instructions, and is comfortable with plan to discharge. Pt is stable for discharge.    I discussed with patient and/or family/caretaker that evaluation in the ED does not suggest any emergent or life threatening medical conditions requiring immediate intervention beyond what was provided in the ED, and I believe patient is safe for " discharge.  Regardless, an unremarkable evaluation in the ED does not preclude the development or presence of a serious of life threatening condition. As such, patient was instructed to return immediately for any worsening or change in current symptoms.      ED Medication(s):  Medications   oxyCODONE-acetaminophen 5-325 mg per tablet 1 tablet (1 tablet Oral Given 2/1/18 2108)       New Prescriptions    TRAMADOL-ACETAMINOPHEN 37.5-325 MG (ULTRACET) 37.5-325 MG TAB    Take 1 tablet by mouth every 4 (four) hours as needed for Pain.     I have advised the patient that she will need to secure an appointment with her pcp or alternatively, pain management to manage her chronic knee pain.  Follow-up Information     C Inocencia Figueroa MD On 2/5/2018.    Specialty:  Internal Medicine                   Medical Decision Making              Scribe Attestation:   Scribe #1: I performed the above scribed service and the documentation accurately describes the services I performed. I attest to the accuracy of the note.    Attending:   Physician Attestation Statement for Scribe #1: I, John Dobson MD, personally performed the services described in this documentation, as scribed by Corinne Mack, in my presence, and it is both accurate and complete.          Clinical Impression       ICD-10-CM ICD-9-CM   1. Chronic pain of both knees M25.561 719.46    M25.562 338.29    G89.29    2. Bipolar disorder, current episode mixed, mild F31.61 296.61       Disposition:   Disposition: Discharged  Condition: Stable         John Dobson MD  02/01/18 4434

## 2018-03-13 ENCOUNTER — HOSPITAL ENCOUNTER (OUTPATIENT)
Facility: HOSPITAL | Age: 58
Discharge: HOME | End: 2018-03-14
Attending: EMERGENCY MEDICINE | Admitting: HOSPITALIST
Payer: MEDICAID

## 2018-03-13 DIAGNOSIS — I50.9 CONGESTIVE HEART FAILURE, UNSPECIFIED CONGESTIVE HEART FAILURE CHRONICITY, UNSPECIFIED CONGESTIVE HEART FAILURE TYPE: ICD-10-CM

## 2018-03-13 DIAGNOSIS — I50.9 CHF (CONGESTIVE HEART FAILURE): ICD-10-CM

## 2018-03-13 DIAGNOSIS — J96.22 ACUTE ON CHRONIC RESPIRATORY FAILURE WITH HYPOXIA AND HYPERCAPNIA: Primary | ICD-10-CM

## 2018-03-13 DIAGNOSIS — Z72.0 TOBACCO ABUSE: ICD-10-CM

## 2018-03-13 DIAGNOSIS — J44.9 CHRONIC OBSTRUCTIVE PULMONARY DISEASE, UNSPECIFIED COPD TYPE: ICD-10-CM

## 2018-03-13 DIAGNOSIS — J96.21 ACUTE ON CHRONIC RESPIRATORY FAILURE WITH HYPOXIA AND HYPERCAPNIA: Primary | ICD-10-CM

## 2018-03-13 DIAGNOSIS — Z99.81 OXYGEN DEPENDENT: ICD-10-CM

## 2018-03-13 PROBLEM — R60.0 PEDAL EDEMA: Status: ACTIVE | Noted: 2018-03-13

## 2018-03-13 LAB
ALBUMIN SERPL BCP-MCNC: 3.2 G/DL
ALLENS TEST: ABNORMAL
ALP SERPL-CCNC: 68 U/L
ALT SERPL W/O P-5'-P-CCNC: 9 U/L
ANION GAP SERPL CALC-SCNC: 13 MMOL/L
AST SERPL-CCNC: 11 U/L
BASOPHILS # BLD AUTO: 0.02 K/UL
BASOPHILS NFR BLD: 0.2 %
BILIRUB SERPL-MCNC: 0.3 MG/DL
BNP SERPL-MCNC: 108 PG/ML
BUN SERPL-MCNC: 6 MG/DL
CALCIUM SERPL-MCNC: 8.7 MG/DL
CHLORIDE SERPL-SCNC: 89 MMOL/L
CO2 SERPL-SCNC: 33 MMOL/L
CREAT SERPL-MCNC: 0.8 MG/DL
DELSYS: ABNORMAL
DIFFERENTIAL METHOD: NORMAL
EOSINOPHIL # BLD AUTO: 0.1 K/UL
EOSINOPHIL NFR BLD: 0.6 %
ERYTHROCYTE [DISTWIDTH] IN BLOOD BY AUTOMATED COUNT: 14.5 %
EST. GFR  (AFRICAN AMERICAN): >60 ML/MIN/1.73 M^2
EST. GFR  (NON AFRICAN AMERICAN): >60 ML/MIN/1.73 M^2
FIO2: 28
FIO2: 32
FLOW: 2
FLOW: 3
GLUCOSE SERPL-MCNC: 87 MG/DL
HCO3 UR-SCNC: 33.7 MMOL/L (ref 24–28)
HCO3 UR-SCNC: 36.7 MMOL/L (ref 24–28)
HCO3 UR-SCNC: 37.6 MMOL/L (ref 24–28)
HCT VFR BLD AUTO: 40.5 %
HGB BLD-MCNC: 13.4 G/DL
LYMPHOCYTES # BLD AUTO: 3.3 K/UL
LYMPHOCYTES NFR BLD: 34.5 %
MCH RBC QN AUTO: 29.7 PG
MCHC RBC AUTO-ENTMCNC: 33.1 G/DL
MCV RBC AUTO: 90 FL
MODE: ABNORMAL
MONOCYTES # BLD AUTO: 0.7 K/UL
MONOCYTES NFR BLD: 7.7 %
NEUTROPHILS # BLD AUTO: 5.4 K/UL
NEUTROPHILS NFR BLD: 57 %
PCO2 BLDA: 51.1 MMHG (ref 35–45)
PCO2 BLDA: 55 MMHG (ref 35–45)
PCO2 BLDA: 56.6 MMHG (ref 35–45)
PH SMN: 7.43 [PH] (ref 7.35–7.45)
PLATELET # BLD AUTO: 303 K/UL
PMV BLD AUTO: 9.4 FL
PO2 BLDA: 38 MMHG (ref 80–100)
PO2 BLDA: 47 MMHG (ref 80–100)
PO2 BLDA: 63 MMHG (ref 80–100)
POC BE: 12 MMOL/L
POC BE: 13 MMOL/L
POC BE: 9 MMOL/L
POC SATURATED O2: 72 % (ref 95–100)
POC SATURATED O2: 83 % (ref 95–100)
POC SATURATED O2: 92 % (ref 95–100)
POTASSIUM SERPL-SCNC: 3.2 MMOL/L
PROT SERPL-MCNC: 7.4 G/DL
RBC # BLD AUTO: 4.51 M/UL
SAMPLE: ABNORMAL
SITE: ABNORMAL
SODIUM SERPL-SCNC: 135 MMOL/L
TROPONIN I SERPL DL<=0.01 NG/ML-MCNC: <0.006 NG/ML
WBC # BLD AUTO: 9.53 K/UL

## 2018-03-13 PROCEDURE — 83880 ASSAY OF NATRIURETIC PEPTIDE: CPT

## 2018-03-13 PROCEDURE — 82803 BLOOD GASES ANY COMBINATION: CPT

## 2018-03-13 PROCEDURE — 94640 AIRWAY INHALATION TREATMENT: CPT | Mod: 76

## 2018-03-13 PROCEDURE — 84484 ASSAY OF TROPONIN QUANT: CPT

## 2018-03-13 PROCEDURE — 96376 TX/PRO/DX INJ SAME DRUG ADON: CPT

## 2018-03-13 PROCEDURE — 99285 EMERGENCY DEPT VISIT HI MDM: CPT | Mod: 25

## 2018-03-13 PROCEDURE — 25000242 PHARM REV CODE 250 ALT 637 W/ HCPCS: Performed by: EMERGENCY MEDICINE

## 2018-03-13 PROCEDURE — 93010 ELECTROCARDIOGRAM REPORT: CPT | Mod: ,,, | Performed by: INTERNAL MEDICINE

## 2018-03-13 PROCEDURE — 27000221 HC OXYGEN, UP TO 24 HOURS

## 2018-03-13 PROCEDURE — 85025 COMPLETE CBC W/AUTO DIFF WBC: CPT

## 2018-03-13 PROCEDURE — 99900035 HC TECH TIME PER 15 MIN (STAT)

## 2018-03-13 PROCEDURE — 80053 COMPREHEN METABOLIC PANEL: CPT

## 2018-03-13 PROCEDURE — 63600175 PHARM REV CODE 636 W HCPCS: Performed by: EMERGENCY MEDICINE

## 2018-03-13 PROCEDURE — G0378 HOSPITAL OBSERVATION PER HR: HCPCS

## 2018-03-13 PROCEDURE — 96374 THER/PROPH/DIAG INJ IV PUSH: CPT

## 2018-03-13 PROCEDURE — 93005 ELECTROCARDIOGRAM TRACING: CPT

## 2018-03-13 PROCEDURE — 36600 WITHDRAWAL OF ARTERIAL BLOOD: CPT

## 2018-03-13 RX ORDER — VALPROIC ACID 250 MG/1
500 CAPSULE, LIQUID FILLED ORAL 2 TIMES DAILY
Status: DISCONTINUED | OUTPATIENT
Start: 2018-03-13 | End: 2018-03-14 | Stop reason: HOSPADM

## 2018-03-13 RX ORDER — OLANZAPINE 5 MG/1
20 TABLET ORAL NIGHTLY
Status: DISCONTINUED | OUTPATIENT
Start: 2018-03-13 | End: 2018-03-14 | Stop reason: HOSPADM

## 2018-03-13 RX ORDER — GABAPENTIN 300 MG/1
300 CAPSULE ORAL 3 TIMES DAILY
Status: DISCONTINUED | OUTPATIENT
Start: 2018-03-14 | End: 2018-03-14 | Stop reason: HOSPADM

## 2018-03-13 RX ORDER — METOPROLOL TARTRATE 50 MG/1
50 TABLET ORAL 2 TIMES DAILY
Status: DISCONTINUED | OUTPATIENT
Start: 2018-03-13 | End: 2018-03-14 | Stop reason: HOSPADM

## 2018-03-13 RX ORDER — IPRATROPIUM BROMIDE AND ALBUTEROL SULFATE 2.5; .5 MG/3ML; MG/3ML
3 SOLUTION RESPIRATORY (INHALATION) EVERY 4 HOURS PRN
Status: DISCONTINUED | OUTPATIENT
Start: 2018-03-13 | End: 2018-03-14 | Stop reason: HOSPADM

## 2018-03-13 RX ORDER — IPRATROPIUM BROMIDE AND ALBUTEROL SULFATE 2.5; .5 MG/3ML; MG/3ML
3 SOLUTION RESPIRATORY (INHALATION)
Status: COMPLETED | OUTPATIENT
Start: 2018-03-13 | End: 2018-03-13

## 2018-03-13 RX ORDER — FUROSEMIDE 10 MG/ML
40 INJECTION INTRAMUSCULAR; INTRAVENOUS 2 TIMES DAILY
Status: DISCONTINUED | OUTPATIENT
Start: 2018-03-14 | End: 2018-03-14 | Stop reason: HOSPADM

## 2018-03-13 RX ORDER — HYDROXYZINE HYDROCHLORIDE 25 MG/1
25 TABLET, FILM COATED ORAL 4 TIMES DAILY
Status: DISCONTINUED | OUTPATIENT
Start: 2018-03-14 | End: 2018-03-14 | Stop reason: HOSPADM

## 2018-03-13 RX ORDER — IPRATROPIUM BROMIDE AND ALBUTEROL SULFATE 2.5; .5 MG/3ML; MG/3ML
3 SOLUTION RESPIRATORY (INHALATION) EVERY 6 HOURS
Status: DISCONTINUED | OUTPATIENT
Start: 2018-03-14 | End: 2018-03-14 | Stop reason: HOSPADM

## 2018-03-13 RX ORDER — LORAZEPAM 1 MG/1
1 TABLET ORAL EVERY 6 HOURS PRN
Status: DISCONTINUED | OUTPATIENT
Start: 2018-03-13 | End: 2018-03-14 | Stop reason: HOSPADM

## 2018-03-13 RX ORDER — ARFORMOTEROL TARTRATE 15 UG/2ML
15 SOLUTION RESPIRATORY (INHALATION) 2 TIMES DAILY
Status: DISCONTINUED | OUTPATIENT
Start: 2018-03-14 | End: 2018-03-14 | Stop reason: HOSPADM

## 2018-03-13 RX ORDER — ENOXAPARIN SODIUM 100 MG/ML
40 INJECTION SUBCUTANEOUS EVERY 24 HOURS
Status: DISCONTINUED | OUTPATIENT
Start: 2018-03-13 | End: 2018-03-14 | Stop reason: HOSPADM

## 2018-03-13 RX ORDER — HYDROCHLOROTHIAZIDE 25 MG/1
25 TABLET ORAL DAILY
Status: DISCONTINUED | OUTPATIENT
Start: 2018-03-14 | End: 2018-03-14 | Stop reason: HOSPADM

## 2018-03-13 RX ORDER — FUROSEMIDE 10 MG/ML
40 INJECTION INTRAMUSCULAR; INTRAVENOUS
Status: COMPLETED | OUTPATIENT
Start: 2018-03-13 | End: 2018-03-13

## 2018-03-13 RX ORDER — BUDESONIDE 0.5 MG/2ML
0.5 INHALANT ORAL EVERY 12 HOURS
Status: DISCONTINUED | OUTPATIENT
Start: 2018-03-14 | End: 2018-03-14 | Stop reason: HOSPADM

## 2018-03-13 RX ORDER — OXYBUTYNIN CHLORIDE 5 MG/1
5 TABLET ORAL 2 TIMES DAILY
Status: DISCONTINUED | OUTPATIENT
Start: 2018-03-13 | End: 2018-03-14 | Stop reason: HOSPADM

## 2018-03-13 RX ORDER — CYCLOBENZAPRINE HCL 10 MG
10 TABLET ORAL 2 TIMES DAILY PRN
Status: DISCONTINUED | OUTPATIENT
Start: 2018-03-13 | End: 2018-03-14 | Stop reason: HOSPADM

## 2018-03-13 RX ORDER — PANTOPRAZOLE SODIUM 40 MG/1
40 TABLET, DELAYED RELEASE ORAL DAILY
Status: DISCONTINUED | OUTPATIENT
Start: 2018-03-14 | End: 2018-03-14 | Stop reason: HOSPADM

## 2018-03-13 RX ORDER — LEVOTHYROXINE SODIUM 112 UG/1
112 TABLET ORAL DAILY
Status: DISCONTINUED | OUTPATIENT
Start: 2018-03-14 | End: 2018-03-14 | Stop reason: HOSPADM

## 2018-03-13 RX ORDER — FLUTICASONE PROPIONATE 50 MCG
1 SPRAY, SUSPENSION (ML) NASAL DAILY
Status: DISCONTINUED | OUTPATIENT
Start: 2018-03-14 | End: 2018-03-13

## 2018-03-13 RX ORDER — ATORVASTATIN CALCIUM 10 MG/1
20 TABLET, FILM COATED ORAL DAILY
Status: DISCONTINUED | OUTPATIENT
Start: 2018-03-14 | End: 2018-03-14 | Stop reason: HOSPADM

## 2018-03-13 RX ORDER — BUPROPION HYDROCHLORIDE 150 MG/1
300 TABLET ORAL DAILY
Status: DISCONTINUED | OUTPATIENT
Start: 2018-03-14 | End: 2018-03-14 | Stop reason: HOSPADM

## 2018-03-13 RX ORDER — FLUTICASONE PROPIONATE AND SALMETEROL 250; 50 UG/1; UG/1
1 POWDER RESPIRATORY (INHALATION) 2 TIMES DAILY
Status: DISCONTINUED | OUTPATIENT
Start: 2018-03-14 | End: 2018-03-13 | Stop reason: CLARIF

## 2018-03-13 RX ADMIN — IPRATROPIUM BROMIDE AND ALBUTEROL SULFATE 3 ML: .5; 3 SOLUTION RESPIRATORY (INHALATION) at 09:03

## 2018-03-13 RX ADMIN — FUROSEMIDE 40 MG: 10 INJECTION, SOLUTION INTRAMUSCULAR; INTRAVENOUS at 06:03

## 2018-03-13 NOTE — ED PROVIDER NOTES
"SCRIBE #1 NOTE: I, Corinne Mack, am scribing for, and in the presence of, Red Dumont MD. I have scribed the entire note.      History      Chief Complaint   Patient presents with    Leg Swelling     Sent from [facility] for bilat leg swelling and hypoxemia. -fever, +chills. Pt states she does not feel SOB.        Review of patient's allergies indicates:   Allergen Reactions    Lithobid [lithium carbonate] Other (See Comments)     Severe depression    Sudafed [pseudoephedrine hcl] Other (See Comments)     Pt states she flat lined    Prednisone Other (See Comments)     Elevated BP    Prozac [fluoxetine] Swelling    Thorazine [chlorpromazine] Swelling        HPI   HPI    3/13/2018, 5:33 PM   History obtained from the patient      History of Present Illness: Esperanza Aquino is a 57 y.o. female patient with PMHx of COPD who was sent to the Emergency Department from [facility] for further evaluation of bilateral leg swelling and hypoxia which onset gradually a few days ago. Pt also c/o worsening SOB. Symptoms are constant and moderate in severity. No mitigating or exacerbating factors reported. Associated sxs include chills. Patient denies any fever, CP, abd pain, N/V, back pain, neck pain, HA, dizziness, and all other sxs at this time. No prior Tx reported outside of the pt's daily medications. Pt reports she is on home O2 PRN. Pt states she only uses her O2 when "she needs it" or feels SOB.  Per the pt, she used her home O2 7-8 times in the last month. No further complaints or concerns at this time.         Arrival mode: Personal vehicle    PCP: JOVANNI Figueroa MD       Past Medical History:  Past Medical History:   Diagnosis Date    Bipolar 1 disorder     not sure what type    Cancer     Uterine Cancer    COPD (chronic obstructive pulmonary disease)     Depression     Encounter for blood transfusion     Hypertension     Personal history of peptic ulcer disease     Pneumonia        Past Surgical " History:  Past Surgical History:   Procedure Laterality Date    APPENDECTOMY       SECTION      CHOLECYSTECTOMY      HERNIA REPAIR      HYSTERECTOMY           Family History:  Family History   Problem Relation Age of Onset    Lung cancer Father     Cancer Father     Brain cancer Brother     Cancer Brother        Social History:  Social History     Social History Main Topics    Smoking status: Current Every Day Smoker     Packs/day: 1.00     Years: 25.00     Types: Cigarettes     Last attempt to quit: 2015    Smokeless tobacco: Never Used      Comment: Patient now using E-Cigarrettes    Alcohol use No    Drug use: No    Sexual activity: Yes     Partners: Male       ROS   Review of Systems   Constitutional: Positive for chills. Negative for fever.   Respiratory: Positive for shortness of breath. Negative for cough.    Cardiovascular: Positive for leg swelling (bilateral). Negative for chest pain.   Gastrointestinal: Negative for abdominal pain, diarrhea, nausea and vomiting.   Musculoskeletal: Negative for back pain, neck pain and neck stiffness.   Skin: Negative for rash and wound.   Neurological: Negative for dizziness, light-headedness, numbness and headaches.   All other systems reviewed and are negative.      Physical Exam      Initial Vitals [18 1443]   BP Pulse Resp Temp SpO2   (!) 140/73 74 20 97.8 °F (36.6 °C) (!) 90 %      MAP       95.33          Physical Exam  Nursing Notes and Vital Signs Reviewed.  Constitutional: Patient is in no apparent distress. Well-developed and well-nourished. Obese.  Head: Atraumatic. Normocephalic.  Eyes: PERRL. EOM intact. Conjunctivae are not pale. No scleral icterus.  ENT: Mucous membranes are moist. Oropharynx is clear and symmetric.    Neck: Supple. Full ROM. No lymphadenopathy.  Cardiovascular: Regular rate. Regular rhythm. No murmurs, rubs, or gallops. Distal pulses are 2+ and symmetric.  Pulmonary/Chest: Mild respiratory distress.  "Tachypnic. Mild accessory muscle use with nasal flaring. Bibasilar crackles. No wheezing.  Abdominal: Soft and non-distended.  There is no tenderness.  No rebound, guarding, or rigidity.   Musculoskeletal: Moves all extremities. No obvious deformities. Moderate-severe BLE pitting edema. No calf tenderness.  Skin: Warm and dry.  Neurological:  Alert, awake, and appropriate.  Normal speech.  No acute focal neurological deficits are appreciated.  Psychiatric: Normal affect. Good eye contact. Appropriate in content.    ED Course    Procedures  ED Vital Signs:  Vitals:    03/13/18 1443 03/13/18 1724 03/13/18 1744 03/13/18 1757   BP: (!) 140/73 (!) 134/59     Pulse: 74 69 72 75   Resp: 20      Temp: 97.8 °F (36.6 °C) 97.6 °F (36.4 °C)     TempSrc: Oral Oral     SpO2: (!) 90% (!) 88%     Weight: 112.5 kg (247 lb 14.5 oz)      Height: 5' 5" (1.651 m)       03/13/18 1816 03/13/18 1818 03/13/18 1919 03/13/18 1930   BP: 132/85  (!) 153/95 117/63   Pulse: 66  72 72   Resp: 18  (!) 22 20   Temp:    98.5 °F (36.9 °C)   TempSrc:    Oral   SpO2: (!) 94% 95% (!) 93% (!) 94%   Weight:       Height:        03/13/18 2146 03/13/18 2151 03/13/18 2156 03/14/18 0029   BP:       Pulse: 72 74 77 80   Resp: 17 18 18 19   Temp:    97.9 °F (36.6 °C)   TempSrc:    Oral   SpO2: (!) 94% (!) 94% (!) 94% (!) 93%   Weight:       Height:        03/14/18 0055   BP: 122/68   Pulse: 80   Resp: 20   Temp:    TempSrc:    SpO2: (!) 94%   Weight:    Height:        Abnormal Lab Results:  Labs Reviewed   B-TYPE NATRIURETIC PEPTIDE - Abnormal; Notable for the following:        Result Value     (*)     All other components within normal limits   COMPREHENSIVE METABOLIC PANEL - Abnormal; Notable for the following:     Sodium 135 (*)     Potassium 3.2 (*)     Chloride 89 (*)     CO2 33 (*)     Albumin 3.2 (*)     ALT 9 (*)     All other components within normal limits   ISTAT PROCEDURE - Abnormal; Notable for the following:     POC PCO2 51.1 (*)     POC PO2 " 47 (*)     POC HCO3 33.7 (*)     POC SATURATED O2 83 (*)     All other components within normal limits   ISTAT PROCEDURE - Abnormal; Notable for the following:     POC PCO2 56.6 (*)     POC PO2 38 (*)     POC HCO3 37.6 (*)     POC SATURATED O2 72 (*)     All other components within normal limits   ISTAT PROCEDURE - Abnormal; Notable for the following:     POC PCO2 55.0 (*)     POC PO2 63 (*)     POC HCO3 36.7 (*)     POC SATURATED O2 92 (*)     All other components within normal limits   CBC W/ AUTO DIFFERENTIAL   TROPONIN I   CBC W/ AUTO DIFFERENTIAL   RENAL FUNCTION PANEL   MAGNESIUM   PHOSPHORUS        All Lab Results:  Results for orders placed or performed during the hospital encounter of 03/13/18   Brain natriuretic peptide   Result Value Ref Range     (H) 0 - 99 pg/mL   CBC auto differential   Result Value Ref Range    WBC 9.53 3.90 - 12.70 K/uL    RBC 4.51 4.00 - 5.40 M/uL    Hemoglobin 13.4 12.0 - 16.0 g/dL    Hematocrit 40.5 37.0 - 48.5 %    MCV 90 82 - 98 fL    MCH 29.7 27.0 - 31.0 pg    MCHC 33.1 32.0 - 36.0 g/dL    RDW 14.5 11.5 - 14.5 %    Platelets 303 150 - 350 K/uL    MPV 9.4 9.2 - 12.9 fL    Gran # (ANC) 5.4 1.8 - 7.7 K/uL    Lymph # 3.3 1.0 - 4.8 K/uL    Mono # 0.7 0.3 - 1.0 K/uL    Eos # 0.1 0.0 - 0.5 K/uL    Baso # 0.02 0.00 - 0.20 K/uL    Gran% 57.0 38.0 - 73.0 %    Lymph% 34.5 18.0 - 48.0 %    Mono% 7.7 4.0 - 15.0 %    Eosinophil% 0.6 0.0 - 8.0 %    Basophil% 0.2 0.0 - 1.9 %    Differential Method Automated    Comprehensive metabolic panel   Result Value Ref Range    Sodium 135 (L) 136 - 145 mmol/L    Potassium 3.2 (L) 3.5 - 5.1 mmol/L    Chloride 89 (L) 95 - 110 mmol/L    CO2 33 (H) 23 - 29 mmol/L    Glucose 87 70 - 110 mg/dL    BUN, Bld 6 6 - 20 mg/dL    Creatinine 0.8 0.5 - 1.4 mg/dL    Calcium 8.7 8.7 - 10.5 mg/dL    Total Protein 7.4 6.0 - 8.4 g/dL    Albumin 3.2 (L) 3.5 - 5.2 g/dL    Total Bilirubin 0.3 0.1 - 1.0 mg/dL    Alkaline Phosphatase 68 55 - 135 U/L    AST 11 10 - 40 U/L     ALT 9 (L) 10 - 44 U/L    Anion Gap 13 8 - 16 mmol/L    eGFR if African American >60 >60 mL/min/1.73 m^2    eGFR if non African American >60 >60 mL/min/1.73 m^2   Troponin I   Result Value Ref Range    Troponin I <0.006 0.000 - 0.026 ng/mL   ISTAT PROCEDURE   Result Value Ref Range    POC PH 7.427 7.35 - 7.45    POC PCO2 51.1 (H) 35 - 45 mmHg    POC PO2 47 (LL) 80 - 100 mmHg    POC HCO3 33.7 (H) 24 - 28 mmol/L    POC BE 9 -2 to 2 mmol/L    POC SATURATED O2 83 (L) 95 - 100 %    Sample ARTERIAL     Site RR     Allens Test Pass     DelSys Room Air     Mode SPONT    ISTAT PROCEDURE   Result Value Ref Range    POC PH 7.430 7.35 - 7.45    POC PCO2 56.6 (HH) 35 - 45 mmHg    POC PO2 38 (LL) 80 - 100 mmHg    POC HCO3 37.6 (H) 24 - 28 mmol/L    POC BE 13 -2 to 2 mmol/L    POC SATURATED O2 72 (L) 95 - 100 %    Sample ARTERIAL     Site LR     Allens Test Pass     DelSys Nasal Can     Mode SPONT     Flow 2     FiO2 28    ISTAT PROCEDURE   Result Value Ref Range    POC PH 7.432 7.35 - 7.45    POC PCO2 55.0 (H) 35 - 45 mmHg    POC PO2 63 (L) 80 - 100 mmHg    POC HCO3 36.7 (H) 24 - 28 mmol/L    POC BE 12 -2 to 2 mmol/L    POC SATURATED O2 92 (L) 95 - 100 %    Sample ARTERIAL     Site RR     Allens Test Pass     DelSys Nasal Can     Mode SPONT     Flow 3     FiO2 32          Imaging Results:  Imaging Results          X-Ray Chest AP Portable (Final result)  Result time 03/13/18 18:23:08    Final result by Cayetano Gotti Jr., MD (03/13/18 18:23:08)                 Impression:     Mild CHF exacerbation.      Electronically signed by: CAYETANO GOTTI  Date:     03/13/18  Time:    18:23              Narrative:    Exam: Portable chest radiograph    History:    Shortness of breath    Comparison: 1/24/2018.    Findings: Mild central vascular congestion with interstitial edema.  No effusion or pneumothorax.  Cardiac silhouette remains enlarged.  Tortuous thoracic aorta.  Osseous structures intact.                               The EKG  was ordered, reviewed, and independently interpreted by the ED provider.  Interpretation time: 1739  Rate: 76 BPM  Rhythm: normal sinus rhythm  Interpretation: Nonspecific ST and T wave abnormalities. No STEMI.           The Emergency Provider reviewed the vital signs and test results, which are outlined above.    ED Discussion     8:27 PM: Re-evaluated pt. I have discussed test results, shared treatment plan, and the need for admission given persistent hypoxia despite initial diuresis and supplemental O2 with patient and family at bedside. Pt and family express understanding at this time and agree with all information. All questions answered. Pt and family have no further questions or concerns at this time. Pt is ready for admit.    8:38 PM: Discussed case with Thu Garcia NP (Hospital Medicine). Dr. Peters agrees with current care and management of pt and accepts admission.   Admitting Service: Hospital medicine   Admitting Physician: Dr. Peters  Admit to: Obs Tele        ED Medication(s):  Medications   albuterol-ipratropium 2.5mg-0.5mg/3mL nebulizer solution 3 mL (not administered)   atorvastatin tablet 20 mg (not administered)   buPROPion TB24 tablet 300 mg (not administered)   cyclobenzaprine tablet 10 mg (not administered)   gabapentin capsule 300 mg (not administered)   hydroCHLOROthiazide tablet 25 mg (not administered)   hydrOXYzine HCl tablet 25 mg (not administered)   levothyroxine tablet 112 mcg (not administered)   metoprolol tartrate (LOPRESSOR) tablet 50 mg (not administered)   OLANZapine tablet 20 mg (not administered)   pantoprazole EC tablet 40 mg (not administered)   oxybutynin tablet 5 mg (5 mg Oral Given 3/14/18 0241)   LORazepam tablet 1 mg (not administered)   valproic acid capsule 500 mg (500 mg Oral Given 3/14/18 0241)   enoxaparin injection 40 mg (not administered)   furosemide injection 40 mg (not administered)   albuterol-ipratropium 2.5mg-0.5mg/3mL nebulizer solution 3 mL (3 mLs  Nebulization Given 3/14/18 0029)   arformoterol nebulizer solution 15 mcg (not administered)     And   budesonide nebulizer solution 0.5 mg (not administered)   furosemide injection 40 mg (40 mg Intravenous Given 3/13/18 1806)   albuterol-ipratropium 2.5mg-0.5mg/3mL nebulizer solution 3 mL (3 mLs Nebulization Given 3/13/18 2156)   albuterol-ipratropium 2.5mg-0.5mg/3mL nebulizer solution 3 mL (3 mLs Nebulization Given 3/13/18 2151)   albuterol-ipratropium 2.5mg-0.5mg/3mL nebulizer solution 3 mL (3 mLs Nebulization Given 3/13/18 2146)   divalproex EC tablet 500 mg (500 mg Oral Given 3/14/18 0303)       New Prescriptions    No medications on file             Medical Decision Making    Medical Decision Making:   Clinical Tests:   Lab Tests: Ordered and Reviewed  Radiological Study: Ordered and Reviewed  Medical Tests: Ordered and Reviewed           Scribe Attestation:   Scribe #1: I performed the above scribed service and the documentation accurately describes the services I performed. I attest to the accuracy of the note.    Attending:   Physician Attestation Statement for Scribe #1: I, Red Dumont MD, personally performed the services described in this documentation, as scribed by Corinne Mack, in my presence, and it is both accurate and complete.          Clinical Impression       ICD-10-CM ICD-9-CM   1. Acute on chronic respiratory failure with hypoxia and hypercapnia J96.21 518.84    J96.22 786.09     799.02   2. Congestive heart failure, unspecified congestive heart failure chronicity, unspecified congestive heart failure type I50.9 428.0   3. Chronic obstructive pulmonary disease, unspecified COPD type J44.9 496   4. Oxygen dependent Z99.81 V46.2   5. Tobacco abuse Z72.0 305.1   6. CHF (congestive heart failure) I50.9 428.0       Disposition:   Disposition: Placed in Observation  Condition: Fair           Red Dumont MD  03/14/18 3250

## 2018-03-14 VITALS
HEART RATE: 81 BPM | WEIGHT: 241.19 LBS | RESPIRATION RATE: 20 BRPM | HEIGHT: 65 IN | OXYGEN SATURATION: 87 % | BODY MASS INDEX: 40.18 KG/M2 | TEMPERATURE: 98 F | SYSTOLIC BLOOD PRESSURE: 111 MMHG | DIASTOLIC BLOOD PRESSURE: 55 MMHG

## 2018-03-14 PROBLEM — Z51.5 COMFORT MEASURES ONLY STATUS: Status: ACTIVE | Noted: 2018-03-14

## 2018-03-14 LAB
ANION GAP SERPL CALC-SCNC: 12 MMOL/L
BASOPHILS # BLD AUTO: 0.02 K/UL
BASOPHILS NFR BLD: 0.3 %
BUN SERPL-MCNC: 6 MG/DL
CALCIUM SERPL-MCNC: 8.6 MG/DL
CHLORIDE SERPL-SCNC: 90 MMOL/L
CO2 SERPL-SCNC: 36 MMOL/L
CREAT SERPL-MCNC: 0.7 MG/DL
DIASTOLIC DYSFUNCTION: NO
DIFFERENTIAL METHOD: ABNORMAL
EOSINOPHIL # BLD AUTO: 0.1 K/UL
EOSINOPHIL NFR BLD: 1.6 %
ERYTHROCYTE [DISTWIDTH] IN BLOOD BY AUTOMATED COUNT: 14.9 %
EST. GFR  (AFRICAN AMERICAN): >60 ML/MIN/1.73 M^2
EST. GFR  (NON AFRICAN AMERICAN): >60 ML/MIN/1.73 M^2
GLUCOSE SERPL-MCNC: 91 MG/DL
HCT VFR BLD AUTO: 38.4 %
HGB BLD-MCNC: 12.5 G/DL
LYMPHOCYTES # BLD AUTO: 2.6 K/UL
LYMPHOCYTES NFR BLD: 41.3 %
MAGNESIUM SERPL-MCNC: 1.3 MG/DL
MCH RBC QN AUTO: 29.3 PG
MCHC RBC AUTO-ENTMCNC: 32.6 G/DL
MCV RBC AUTO: 90 FL
MONOCYTES # BLD AUTO: 0.7 K/UL
MONOCYTES NFR BLD: 11 %
NEUTROPHILS # BLD AUTO: 2.9 K/UL
NEUTROPHILS NFR BLD: 45.8 %
PLATELET # BLD AUTO: 304 K/UL
PMV BLD AUTO: 10.1 FL
POTASSIUM SERPL-SCNC: 3 MMOL/L
RBC # BLD AUTO: 4.26 M/UL
RETIRED EF AND QEF - SEE NOTES: 75 (ref 55–65)
SODIUM SERPL-SCNC: 138 MMOL/L
WBC # BLD AUTO: 6.27 K/UL

## 2018-03-14 PROCEDURE — 93306 TTE W/DOPPLER COMPLETE: CPT

## 2018-03-14 PROCEDURE — 93306 TTE W/DOPPLER COMPLETE: CPT | Mod: 26,,, | Performed by: INTERNAL MEDICINE

## 2018-03-14 PROCEDURE — 25000003 PHARM REV CODE 250: Performed by: EMERGENCY MEDICINE

## 2018-03-14 PROCEDURE — 85025 COMPLETE CBC W/AUTO DIFF WBC: CPT

## 2018-03-14 PROCEDURE — 25000242 PHARM REV CODE 250 ALT 637 W/ HCPCS: Performed by: HOSPITALIST

## 2018-03-14 PROCEDURE — G0378 HOSPITAL OBSERVATION PER HR: HCPCS

## 2018-03-14 PROCEDURE — 83735 ASSAY OF MAGNESIUM: CPT

## 2018-03-14 PROCEDURE — 27000221 HC OXYGEN, UP TO 24 HOURS

## 2018-03-14 PROCEDURE — 25000003 PHARM REV CODE 250: Performed by: NURSE PRACTITIONER

## 2018-03-14 PROCEDURE — 80048 BASIC METABOLIC PNL TOTAL CA: CPT

## 2018-03-14 PROCEDURE — 94640 AIRWAY INHALATION TREATMENT: CPT | Mod: 76

## 2018-03-14 PROCEDURE — 63600175 PHARM REV CODE 636 W HCPCS: Performed by: EMERGENCY MEDICINE

## 2018-03-14 PROCEDURE — 25000242 PHARM REV CODE 250 ALT 637 W/ HCPCS: Performed by: EMERGENCY MEDICINE

## 2018-03-14 RX ORDER — POTASSIUM CHLORIDE 20 MEQ/1
40 TABLET, EXTENDED RELEASE ORAL ONCE
Status: COMPLETED | OUTPATIENT
Start: 2018-03-14 | End: 2018-03-14

## 2018-03-14 RX ORDER — AZITHROMYCIN 250 MG/1
500 TABLET, FILM COATED ORAL DAILY
Qty: 5 TABLET | Refills: 0 | Status: SHIPPED | OUTPATIENT
Start: 2018-03-14 | End: 2019-04-02 | Stop reason: ALTCHOICE

## 2018-03-14 RX ORDER — DIVALPROEX SODIUM 500 MG/1
500 TABLET, DELAYED RELEASE ORAL
Status: COMPLETED | OUTPATIENT
Start: 2018-03-14 | End: 2018-03-14

## 2018-03-14 RX ORDER — IPRATROPIUM BROMIDE AND ALBUTEROL SULFATE 2.5; .5 MG/3ML; MG/3ML
3 SOLUTION RESPIRATORY (INHALATION) EVERY 4 HOURS PRN
Qty: 1 BOX | Refills: 2 | Status: SHIPPED | OUTPATIENT
Start: 2018-03-14 | End: 2019-03-14

## 2018-03-14 RX ORDER — LANOLIN ALCOHOL/MO/W.PET/CERES
400 CREAM (GRAM) TOPICAL 2 TIMES DAILY
Status: DISCONTINUED | OUTPATIENT
Start: 2018-03-14 | End: 2018-03-14 | Stop reason: HOSPADM

## 2018-03-14 RX ADMIN — IPRATROPIUM BROMIDE AND ALBUTEROL SULFATE 3 ML: .5; 3 SOLUTION RESPIRATORY (INHALATION) at 01:03

## 2018-03-14 RX ADMIN — HYDROXYZINE HYDROCHLORIDE 25 MG: 25 TABLET, FILM COATED ORAL at 08:03

## 2018-03-14 RX ADMIN — ATORVASTATIN CALCIUM 20 MG: 10 TABLET, FILM COATED ORAL at 08:03

## 2018-03-14 RX ADMIN — IPRATROPIUM BROMIDE AND ALBUTEROL SULFATE 3 ML: .5; 3 SOLUTION RESPIRATORY (INHALATION) at 12:03

## 2018-03-14 RX ADMIN — LEVOTHYROXINE SODIUM 112 MCG: 112 TABLET ORAL at 08:03

## 2018-03-14 RX ADMIN — FUROSEMIDE 40 MG: 10 INJECTION, SOLUTION INTRAMUSCULAR; INTRAVENOUS at 08:03

## 2018-03-14 RX ADMIN — HYDROCHLOROTHIAZIDE 25 MG: 25 TABLET ORAL at 08:03

## 2018-03-14 RX ADMIN — POTASSIUM CHLORIDE 40 MEQ: 1500 TABLET, EXTENDED RELEASE ORAL at 01:03

## 2018-03-14 RX ADMIN — OXYBUTYNIN CHLORIDE 5 MG: 5 TABLET ORAL at 08:03

## 2018-03-14 RX ADMIN — GABAPENTIN 300 MG: 300 CAPSULE ORAL at 03:03

## 2018-03-14 RX ADMIN — ARFORMOTEROL TARTRATE 15 MCG: 15 SOLUTION RESPIRATORY (INHALATION) at 07:03

## 2018-03-14 RX ADMIN — DIVALPROEX SODIUM 500 MG: 500 TABLET, DELAYED RELEASE ORAL at 03:03

## 2018-03-14 RX ADMIN — BUDESONIDE 0.5 MG: 0.5 SUSPENSION RESPIRATORY (INHALATION) at 07:03

## 2018-03-14 RX ADMIN — VALPROIC ACID 500 MG: 250 CAPSULE, LIQUID FILLED ORAL at 02:03

## 2018-03-14 RX ADMIN — BUPROPION HYDROCHLORIDE 300 MG: 150 TABLET, FILM COATED, EXTENDED RELEASE ORAL at 08:03

## 2018-03-14 RX ADMIN — HYDROXYZINE HYDROCHLORIDE 25 MG: 25 TABLET, FILM COATED ORAL at 01:03

## 2018-03-14 RX ADMIN — METOPROLOL TARTRATE 50 MG: 50 TABLET ORAL at 08:03

## 2018-03-14 RX ADMIN — IPRATROPIUM BROMIDE AND ALBUTEROL SULFATE 3 ML: .5; 3 SOLUTION RESPIRATORY (INHALATION) at 07:03

## 2018-03-14 RX ADMIN — VALPROIC ACID 500 MG: 250 CAPSULE, LIQUID FILLED ORAL at 01:03

## 2018-03-14 RX ADMIN — OXYBUTYNIN CHLORIDE 5 MG: 5 TABLET ORAL at 02:03

## 2018-03-14 RX ADMIN — POTASSIUM CHLORIDE 40 MEQ: 1500 TABLET, EXTENDED RELEASE ORAL at 05:03

## 2018-03-14 RX ADMIN — MAGNESIUM OXIDE TAB 400 MG (241.3 MG ELEMENTAL MG) 400 MG: 400 (241.3 MG) TAB at 01:03

## 2018-03-14 RX ADMIN — PANTOPRAZOLE SODIUM 40 MG: 40 TABLET, DELAYED RELEASE ORAL at 08:03

## 2018-03-14 RX ADMIN — GABAPENTIN 300 MG: 300 CAPSULE ORAL at 08:03

## 2018-03-14 NOTE — HPI
57F h/o COPD presents with bilateral lower extremity edema from urgent care.  Ongoing for 2 days.  Associated with SOB. Reports she has to use ventolin inhaler more often, several times a day.  She had been diagnosed with pneumonia recently per her landlord who was at bedside.  They live together. No family at bedside.  She is on home oxygen 3L and only uses occasionally.  Reports she still does smoke.  No other exacerbating or alleviating factors. She is a full code and her surrogate decision maker  Is her friend Bret Moise, [phone].

## 2018-03-14 NOTE — ED NOTES
Pt. Noted lying in bed resting to comfort. Respirations even and unlabored. Remains on continuous monitor, 3 liters per nasal cannula at 94%. Male visitor at bedside, Awaiting bed placement. NAD noted at this time. Bed low, call bell in reach, side rails up x2. Lights turned off to comfort, plan of care continued.

## 2018-03-14 NOTE — PLAN OF CARE
03/14/18 1345   Discharge Assessment   Assessment Type Discharge Planning Assessment   Confirmed/corrected address and phone number on facesheet? Yes   Assessment information obtained from? Patient   Communicated expected length of stay with patient/caregiver no   Prior to hospitilization cognitive status: Alert/Oriented   Prior to hospitalization functional status: Independent   Current cognitive status: Alert/Oriented   Current Functional Status: Independent   Lives With friend(s)  (Patient states she lives with her friends Dillan Moise, Jeremi Giang, and Kip. )   Able to Return to Prior Arrangements unable to determine at this time (comments)   Is patient able to care for self after discharge? Unable to determine at this time (comments)   Who are your caregiver(s) and their phone number(s)? Bret Moise [phone]  & Jeremi Herrera [phone]   Patient's perception of discharge disposition other (comments)  (Unable to determine at this time. )   Patient currently receives any other outside agency services? No   Equipment Currently Used at Home oxygen   Do you have any problems affording any of your prescribed medications? No   Is the patient taking medications as prescribed? no   Does the patient have transportation home? Yes   Transportation Available family or friend will provide   Dialysis Name and Scheduled days N/A   Does the patient receive services at the [facility]? No   Discharge Plan A Other  (Home with friends. )   Discharge Plan B Other  (Home with friends. )   Patient/Family In Agreement With Plan yes

## 2018-03-14 NOTE — ED NOTES
Pt. Sitting up in bed resting to comfort. Respirations even and unlabored. Remains on continuous monitor, awaiting bed placement at this time. Plan of care continued.

## 2018-03-14 NOTE — PROGRESS NOTES
[facility] Hospital Medicine  Progress Note    Patient Name: Esperanza Aquino  MRN: [id number]  Patient Class: OP- Observation   Admission Date: 3/13/2018  Length of Stay: 0 days  Attending Physician: Kip Garrett MD;Ha *  Primary Care Provider: JOVANNI Figueroa MD        Subjective:     Principal Problem:Acute on chronic respiratory failure with hypoxia and hypercapnia    HPI:  57F h/o COPD presents with bilateral lower extremity edema from urgent care.  Ongoing for 2 days.  Associated with SOB. Reports she has to use ventolin inhaler more often, several times a day.  She had been diagnosed with pneumonia recently per her landlord who was at bedside.  They live together. No family at bedside.  She is on home oxygen 3L and only uses occasionally.  Reports she still does smoke.  No other exacerbating or alleviating factors.         Interval History: She was kept for observation for acute on chronic respiratory failure with hypoxia and hypercapnia under the care of Hospital Medicine. She was put on oxygen and given IV furosemide. She is producing large amounts of urine and reports her breathing has improved greatly.     Review of Systems   Constitutional: Negative for chills, diaphoresis, fatigue and fever.   HENT: Negative for congestion, rhinorrhea, sore throat and trouble swallowing.    Eyes: Negative for pain and visual disturbance.   Respiratory: Positive for chest tightness. Negative for cough, shortness of breath and wheezing.    Cardiovascular: Positive for leg swelling. Negative for chest pain and palpitations.   Gastrointestinal: Negative for abdominal distention, abdominal pain, constipation, diarrhea, nausea and vomiting.   Endocrine: Negative for polyphagia and polyuria.   Genitourinary: Negative for difficulty urinating, dysuria, flank pain, frequency, hematuria and urgency.   Musculoskeletal: Negative for arthralgias, myalgias and neck stiffness.   Skin: Negative for color change  and wound.   Allergic/Immunologic: Negative.    Neurological: Negative for dizziness, speech difficulty, weakness, light-headedness and headaches.   Hematological: Negative.    Psychiatric/Behavioral: Negative for agitation, behavioral problems and confusion. The patient is not nervous/anxious.    All other systems reviewed and are negative.    Objective:     Vital Signs (Most Recent):  Temp: 98.2 °F (36.8 °C) (03/14/18 1201)  Pulse: 74 (03/14/18 1300)  Resp: 17 (03/14/18 1300)  BP: 118/71 (03/14/18 1201)  SpO2: (!) 91 % (03/14/18 1300) Vital Signs (24h Range):  Temp:  [97.6 °F (36.4 °C)-98.6 °F (37 °C)] 98.2 °F (36.8 °C)  Pulse:  [66-84] 74  Resp:  [14-22] 17  SpO2:  [88 %-97 %] 91 %  BP: (116-153)/(59-95) 118/71     Weight: 109.4 kg (241 lb 2.9 oz)  Body mass index is 40.13 kg/m².    Intake/Output Summary (Last 24 hours) at 03/14/18 1339  Last data filed at 03/14/18 1300   Gross per 24 hour   Intake              120 ml   Output             1700 ml   Net            -1580 ml      Physical Exam   Constitutional: She is oriented to person, place, and time. She appears well-developed and well-nourished. No distress.   HENT:   Head: Normocephalic and atraumatic.   Mouth/Throat: Oropharynx is clear and moist.   Eyes: Conjunctivae and EOM are normal. Pupils are equal, round, and reactive to light. No scleral icterus.   Neck: Normal range of motion. Neck supple. No JVD present.   Cardiovascular: Normal rate, regular rhythm and intact distal pulses.  Exam reveals no gallop and no friction rub.    No murmur heard.  Pulmonary/Chest: Effort normal. No respiratory distress. She has no wheezes. She has rales (diffuse). She exhibits no tenderness.   Abdominal: Soft. Bowel sounds are normal. She exhibits no distension and no mass. There is no tenderness. There is no guarding.   Musculoskeletal: Normal range of motion. She exhibits edema (BLE pitting edema +1, up to ankles). She exhibits no tenderness.   Neurological: She is alert  and oriented to person, place, and time. No cranial nerve deficit. Coordination normal.   Skin: Skin is warm and dry. Capillary refill takes 2 to 3 seconds. No rash noted. She is not diaphoretic. No erythema.   Psychiatric: She has a normal mood and affect. Her behavior is normal. Judgment and thought content normal.   Nursing note and vitals reviewed.      Significant Labs:   Recent Lab Results       03/14/18  1000 03/14/18  0852 03/14/18  0612 03/13/18 2012 03/13/18  1921      Albumin          Alkaline Phosphatase          Allens Test    Pass Pass     ALT          Anion Gap  12        AST          Baso #   0.02       Basophil%   0.3       Total Bilirubin          BNP          Site    RR LR     BUN, Bld  6        Calcium  8.6(L)        Chloride  90(L)        CO2  36(H)        Creatinine  0.7        Diastolic Dysfunction No         DelSys    Nasal Can Nasal Can     Differential Method   Automated       EF 75         eGFR if   >60        eGFR if non   >60  Comment:  Calculation used to obtain the estimated glomerular filtration  rate (eGFR) is the CKD-EPI equation.           Eos #   0.1       Eosinophil%   1.6       FiO2    32 28     Flow    3 2     Glucose  91        Gran # (ANC)   2.9       Gran%   45.8       Hematocrit   38.4       Hemoglobin   12.5       Lymph #   2.6       Lymph%   41.3       Magnesium  1.3(L)        MCH   29.3       MCHC   32.6       MCV   90       Mode    SPONT SPONT     Mono #   0.7       Mono%   11.0       MPV   10.1       Platelets   304       POC BE    12 13     POC HCO3    36.7(H) 37.6(H)     POC PCO2    55.0(H) 56.6(HH)     POC PH    7.432 7.430     POC PO2    63(L) 38(LL)     POC SATURATED O2    92(L) 72(L)     Potassium  3.0(L)        Total Protein          RBC   4.26       RDW   14.9(H)       Sample    ARTERIAL ARTERIAL     Sodium  138        Troponin I          WBC   6.27                   03/13/18  1757 03/13/18  1754      Albumin  3.2(L)      Alkaline Phosphatase  68     Allens Test Pass      ALT  9(L)     Anion Gap  13     AST  11     Baso #  0.02     Basophil%  0.2     Total Bilirubin  0.3  Comment:  For infants and newborns, interpretation of results should be based  on gestational age, weight and in agreement with clinical  observations.  Premature Infant recommended reference ranges:  Up to 24 hours.............<8.0 mg/dL  Up to 48 hours............<12.0 mg/dL  3-5 days..................<15.0 mg/dL  6-29 days.................<15.0 mg/dL       BNP  108  Comment:  Values of less than 100 pg/ml are consistent with non-CHF populations.(H)     Site RR      BUN, Bld  6     Calcium  8.7     Chloride  89(L)     CO2  33(H)     Creatinine  0.8     Diastolic Dysfunction       [facility] Room Air      Differential Method  Automated     EF       eGFR if   >60     eGFR if non   >60  Comment:  Calculation used to obtain the estimated glomerular filtration  rate (eGFR) is the CKD-EPI equation.        Eos #  0.1     Eosinophil%  0.6     FiO2       Flow       Glucose  87     Gran # (ANC)  5.4     Gran%  57.0     Hematocrit  40.5     Hemoglobin  13.4     Lymph #  3.3     Lymph%  34.5     Magnesium       MCH  29.7     MCHC  33.1     MCV  90     Mode SPONT      Mono #  0.7     Mono%  7.7     MPV  9.4     Platelets  303     POC BE 9      POC HCO3 33.7(H)      POC PCO2 51.1(H)      POC PH 7.427      POC PO2 47(LL)      POC SATURATED O2 83(L)      Potassium  3.2(L)     Total Protein  7.4     RBC  4.51     RDW  14.5     Sample ARTERIAL      Sodium  135(L)     Troponin I  <0.006  Comment:  The reference interval for Troponin I represents the 99th percentile   cutoff   for our facility and is consistent with 3rd generation assay   performance.       WBC  9.53         All pertinent labs within the past 24 hours have been reviewed.    Significant Imaging: I have reviewed all pertinent imaging results/findings within the past 24  hours.    Assessment/Plan:      * Acute on chronic respiratory failure with hypoxia and hypercapnia    Normal pH  Co2 retainer, continues to smoke  On 3L home O2  Currently on 3L O2 per [place], keep sat>92%  Continue duoneb q6h,solumedrold bid for copd  Possible new CHF from CXR, check echo    --echo pending  --supplemental oxygen to maintain sats >92%  --discussed smoking cessation in depth          Bipolar disorder, current episode mixed, mild    On several medications, continue home meds which includes olanzapine, VPA, bupropion, ativan prn anziety            Essential hypertension    Continue metoprolol, hctz,   --stable on current meds        Hypothyroid    Check tsh  Continue synthroid at home dose          Tobacco abuse    Recommend cessation  --refuses nicotine patch  --discussed cessation in depth          Pedal edema    Bilateral lower extremity  Unclear etiology, patient reports no history of CHF    CXR showing vascular congestion/cardiomegaly c/w CHF  Check 2decho        COPD (chronic obstructive pulmonary disease)    Continue symbicort bid, duoneb q6h            VTE Risk Mitigation         Ordered     enoxaparin injection 40 mg  Daily     Route:  Subcutaneous        03/13/18 2257     Medium Risk of VTE  Once      03/13/18 2257     Place ADITHYA hose  Until discontinued      03/13/18 2257     Place sequential compression device  Until discontinued      03/13/18 2257              RONA Trejo  Department of Hospital Medicine   [facility]

## 2018-03-14 NOTE — ASSESSMENT & PLAN NOTE
On several medications, continue home meds which includes olanzapine, VPA, bupropion, ativan prn anziety

## 2018-03-14 NOTE — SUBJECTIVE & OBJECTIVE
Past Medical History:   Diagnosis Date    Bipolar 1 disorder     not sure what type    Cancer     Uterine Cancer    COPD (chronic obstructive pulmonary disease)     Depression     Encounter for blood transfusion     Hypertension     Personal history of peptic ulcer disease     Pneumonia        Past Surgical History:   Procedure Laterality Date    APPENDECTOMY       SECTION      CHOLECYSTECTOMY      HERNIA REPAIR      HYSTERECTOMY         Review of patient's allergies indicates:   Allergen Reactions    Lithobid [lithium carbonate] Other (See Comments)     Severe depression    Sudafed [pseudoephedrine hcl] Other (See Comments)     Pt states she flat lined    Prednisone Other (See Comments)     Elevated BP    Prozac [fluoxetine] Swelling    Thorazine [chlorpromazine] Swelling       No current facility-administered medications on file prior to encounter.      Current Outpatient Prescriptions on File Prior to Encounter   Medication Sig    atorvastatin (LIPITOR) 20 MG tablet Take 20 mg by mouth once daily.    buPROPion (WELLBUTRIN XL) 300 MG 24 hr tablet Take 300 mg by mouth once daily.    cyclobenzaprine (FLEXERIL) 10 MG tablet Take 10 mg by mouth 2 (two) times daily as needed for Muscle spasms.    gabapentin (NEURONTIN) 300 MG capsule Take 300 mg by mouth 3 (three) times daily.    hydroCHLOROthiazide (HYDRODIURIL) 25 MG tablet Take 25 mg by mouth once daily.    hydrOXYzine HCl (ATARAX) 25 MG tablet Take 25 mg by mouth 4 (four) times daily.    levothyroxine (SYNTHROID) 112 MCG tablet Take 112 mcg by mouth once daily.    lorazepam (ATIVAN) 1 MG tablet Take 1 mg by mouth every 6 (six) hours as needed for Anxiety.    metoprolol tartrate (LOPRESSOR) 50 MG tablet Take 50 mg by mouth 2 (two) times daily.    OLANZapine (ZYPREXA) 20 MG tablet Take 20 mg by mouth every evening.    omeprazole (PRILOSEC) 40 MG capsule Take 40 mg by mouth once daily.    oxybutynin (DITROPAN) 5 MG Tab Take 5 mg  by mouth 2 (two) times daily.    promethazine (PHENERGAN) 25 MG tablet Take 25 mg by mouth every 4 (four) hours.    valproic acid (DEPAKENE) 250 mg capsule Take 500 mg by mouth 2 (two) times daily.     ALBUTEROL SULFATE (VENTOLIN HFA INHL) Inhale into the lungs.    albuterol-ipratropium 2.5mg-0.5mg/3mL (DUO-NEB) 0.5 mg-3 mg(2.5 mg base)/3 mL nebulizer solution Take 3 mLs by nebulization every 4 (four) hours as needed for Wheezing. Rescue    ferrous gluconate (FERGON) 324 MG tablet Take 1 tablet (324 mg total) by mouth 2 (two) times daily with meals.    fluticasone (FLONASE) 50 mcg/actuation nasal spray 1 spray by Each Nare route once daily.    fluticasone-vilanterol (BREO ELLIPTA) 200-25 mcg/dose DsDv diskus inhaler Inhale 1 puff into the lungs once daily. Controller     Family History     Problem Relation (Age of Onset)    Brain cancer Brother    Cancer Father, Brother    Lung cancer Father        Social History Main Topics    Smoking status: Current Every Day Smoker     Packs/day: 1.00     Years: 25.00     Types: Cigarettes     Last attempt to quit: 1/7/2015    Smokeless tobacco: Never Used      Comment: Patient now using E-Cigarrettes    Alcohol use No    Drug use: No    Sexual activity: Yes     Partners: Male     Review of Systems   Constitutional: Negative for chills, diaphoresis, fatigue, fever and unexpected weight change.   HENT: Negative for congestion, facial swelling, sore throat and trouble swallowing.    Eyes: Negative for photophobia, redness and visual disturbance.   Respiratory: Negative for apnea, cough, chest tightness, shortness of breath and wheezing.    Cardiovascular: Positive for leg swelling. Negative for chest pain and palpitations.   Gastrointestinal: Negative for abdominal distention, abdominal pain, blood in stool, constipation, diarrhea, nausea and vomiting.   Endocrine: Negative for polydipsia, polyphagia and polyuria.   Genitourinary: Negative for difficulty urinating,  dysuria, flank pain, frequency, hematuria and urgency.   Musculoskeletal: Negative for arthralgias, back pain, joint swelling, myalgias and neck stiffness.   Skin: Negative for pallor and rash.   Allergic/Immunologic: Negative for immunocompromised state.   Neurological: Negative for dizziness, tremors, syncope, weakness, light-headedness and headaches.   Psychiatric/Behavioral: Negative for agitation, confusion and suicidal ideas.   All other systems reviewed and are negative.    Objective:     Vital Signs (Most Recent):  Temp: 98.5 °F (36.9 °C) (03/13/18 1930)  Pulse: 77 (03/13/18 2156)  Resp: 18 (03/13/18 2156)  BP: 117/63 (03/13/18 1930)  SpO2: (!) 94 % (03/13/18 2156) Vital Signs (24h Range):  Temp:  [97.6 °F (36.4 °C)-98.5 °F (36.9 °C)] 98.5 °F (36.9 °C)  Pulse:  [66-77] 77  Resp:  [17-22] 18  SpO2:  [88 %-95 %] 94 %  BP: (117-153)/(59-95) 117/63     Weight: 112.5 kg (247 lb 14.5 oz)  Body mass index is 41.25 kg/m².    Physical Exam   Constitutional: She is oriented to person, place, and time. She appears well-developed and well-nourished. No distress.   HENT:   Head: Normocephalic and atraumatic.   Eyes: Conjunctivae and EOM are normal. Pupils are equal, round, and reactive to light. No scleral icterus.   Neck: Normal range of motion. Neck supple. No JVD present. No thyromegaly present.   Cardiovascular: Normal rate, regular rhythm and intact distal pulses.  Exam reveals no gallop and no friction rub.    No murmur heard.  Pulmonary/Chest: Effort normal. No respiratory distress. She has no wheezes. She has rales (diffuse). She exhibits no tenderness.   Abdominal: Soft. Bowel sounds are normal. She exhibits no distension and no mass. There is no tenderness. There is no guarding.   Musculoskeletal: Normal range of motion. She exhibits edema (BLE pitting edema +1, up to ankles). She exhibits no tenderness.   Neurological: She is alert and oriented to person, place, and time. No cranial nerve deficit.   Skin: Skin  is warm and dry. Capillary refill takes 2 to 3 seconds. No rash noted. She is not diaphoretic. No erythema.   Psychiatric: She has a normal mood and affect.   Nursing note and vitals reviewed.        CRANIAL NERVES     CN III, IV, VI   Pupils are equal, round, and reactive to light.  Extraocular motions are normal.        Significant Labs:   ABGs:   Recent Labs  Lab 03/13/18 2012   PH 7.432   PCO2 55.0*   HCO3 36.7*   POCSATURATED 92*   BE 12     BMP:   Recent Labs  Lab 03/13/18  1754   GLU 87   *   K 3.2*   CL 89*   CO2 33*   BUN 6   CREATININE 0.8   CALCIUM 8.7     CBC:   Recent Labs  Lab 03/13/18  1754   WBC 9.53   HGB 13.4   HCT 40.5        Magnesium: No results for input(s): MG in the last 48 hours.  TSH: No results for input(s): TSH in the last 4320 hours.  Urine Studies: No results for input(s): COLORU, APPEARANCEUA, PHUR, SPECGRAV, PROTEINUA, GLUCUA, KETONESU, BILIRUBINUA, OCCULTUA, NITRITE, UROBILINOGEN, LEUKOCYTESUR, RBCUA, WBCUA, BACTERIA, SQUAMEPITHEL, HYALINECASTS in the last 48 hours.    Invalid input(s): WRIGHTSUR  All pertinent labs within the past 24 hours have been reviewed.    Significant Imaging: I have reviewed and interpreted all pertinent imaging results/findings within the past 24 hours.   Imaging Results          X-Ray Chest AP Portable (Final result)  Result time 03/13/18 18:23:08    Final result by Cayetano Gotti Jr., MD (03/13/18 18:23:08)                 Impression:     Mild CHF exacerbation.      Electronically signed by: CAYETANO GOTTI  Date:     03/13/18  Time:    18:23              Narrative:    Exam: Portable chest radiograph    History:    Shortness of breath    Comparison: 1/24/2018.    Findings: Mild central vascular congestion with interstitial edema.  No effusion or pneumothorax.  Cardiac silhouette remains enlarged.  Tortuous thoracic aorta.  Osseous structures intact.

## 2018-03-14 NOTE — SUBJECTIVE & OBJECTIVE
Interval History: She was kept for observation for acute on chronic respiratory failure with hypoxia and hypercapnia under the care of Hospital Medicine. She was put on oxygen and given IV furosemide. She is producing large amounts of urine and reports her breathing has improved greatly.     Review of Systems   Constitutional: Negative for chills, diaphoresis, fatigue and fever.   HENT: Negative for congestion, rhinorrhea, sore throat and trouble swallowing.    Eyes: Negative for pain and visual disturbance.   Respiratory: Positive for chest tightness. Negative for cough, shortness of breath and wheezing.    Cardiovascular: Positive for leg swelling. Negative for chest pain and palpitations.   Gastrointestinal: Negative for abdominal distention, abdominal pain, constipation, diarrhea, nausea and vomiting.   Endocrine: Negative for polyphagia and polyuria.   Genitourinary: Negative for difficulty urinating, dysuria, flank pain, frequency, hematuria and urgency.   Musculoskeletal: Negative for arthralgias, myalgias and neck stiffness.   Skin: Negative for color change and wound.   Allergic/Immunologic: Negative.    Neurological: Negative for dizziness, speech difficulty, weakness, light-headedness and headaches.   Hematological: Negative.    Psychiatric/Behavioral: Negative for agitation, behavioral problems and confusion. The patient is not nervous/anxious.    All other systems reviewed and are negative.    Objective:     Vital Signs (Most Recent):  Temp: 98.2 °F (36.8 °C) (03/14/18 1201)  Pulse: 74 (03/14/18 1300)  Resp: 17 (03/14/18 1300)  BP: 118/71 (03/14/18 1201)  SpO2: (!) 91 % (03/14/18 1300) Vital Signs (24h Range):  Temp:  [97.6 °F (36.4 °C)-98.6 °F (37 °C)] 98.2 °F (36.8 °C)  Pulse:  [66-84] 74  Resp:  [14-22] 17  SpO2:  [88 %-97 %] 91 %  BP: (116-153)/(59-95) 118/71     Weight: 109.4 kg (241 lb 2.9 oz)  Body mass index is 40.13 kg/m².    Intake/Output Summary (Last 24 hours) at 03/14/18 2335  Last data  filed at 03/14/18 1300   Gross per 24 hour   Intake              120 ml   Output             1700 ml   Net            -1580 ml      Physical Exam   Constitutional: She is oriented to person, place, and time. She appears well-developed and well-nourished. No distress.   HENT:   Head: Normocephalic and atraumatic.   Mouth/Throat: Oropharynx is clear and moist.   Eyes: Conjunctivae and EOM are normal. Pupils are equal, round, and reactive to light. No scleral icterus.   Neck: Normal range of motion. Neck supple. No JVD present.   Cardiovascular: Normal rate, regular rhythm and intact distal pulses.  Exam reveals no gallop and no friction rub.    No murmur heard.  Pulmonary/Chest: Effort normal. No respiratory distress. She has no wheezes. She has rales (diffuse). She exhibits no tenderness.   Abdominal: Soft. Bowel sounds are normal. She exhibits no distension and no mass. There is no tenderness. There is no guarding.   Musculoskeletal: Normal range of motion. She exhibits edema (BLE pitting edema +1, up to ankles). She exhibits no tenderness.   Neurological: She is alert and oriented to person, place, and time. No cranial nerve deficit. Coordination normal.   Skin: Skin is warm and dry. Capillary refill takes 2 to 3 seconds. No rash noted. She is not diaphoretic. No erythema.   Psychiatric: She has a normal mood and affect. Her behavior is normal. Judgment and thought content normal.   Nursing note and vitals reviewed.      Significant Labs:   Recent Lab Results       03/14/18  1000 03/14/18  0852 03/14/18  0612 03/13/18 2012 03/13/18  1921      Albumin          Alkaline Phosphatase          Allens Test    Pass Pass     ALT          Anion Gap  12        AST          Baso #   0.02       Basophil%   0.3       Total Bilirubin          BNP          Site    RR LR     BUN, Bld  6        Calcium  8.6(L)        Chloride  90(L)        CO2  36(H)        Creatinine  0.7        Diastolic Dysfunction No         DelSys    Nasal  Can Nasal Can     Differential Method   Automated       EF 75         eGFR if   >60        eGFR if non   >60  Comment:  Calculation used to obtain the estimated glomerular filtration  rate (eGFR) is the CKD-EPI equation.           Eos #   0.1       Eosinophil%   1.6       FiO2    32 28     Flow    3 2     Glucose  91        Gran # (ANC)   2.9       Gran%   45.8       Hematocrit   38.4       Hemoglobin   12.5       Lymph #   2.6       Lymph%   41.3       Magnesium  1.3(L)        MCH   29.3       MCHC   32.6       MCV   90       Mode    SPONT SPONT     Mono #   0.7       Mono%   11.0       MPV   10.1       Platelets   304       POC BE    12 13     POC HCO3    36.7(H) 37.6(H)     POC PCO2    55.0(H) 56.6(HH)     POC PH    7.432 7.430     POC PO2    63(L) 38(LL)     POC SATURATED O2    92(L) 72(L)     Potassium  3.0(L)        Total Protein          RBC   4.26       RDW   14.9(H)       Sample    ARTERIAL ARTERIAL     Sodium  138        Troponin I          WBC   6.27                   03/13/18  1757 03/13/18  1754      Albumin  3.2(L)     Alkaline Phosphatase  68     Allens Test Pass      ALT  9(L)     Anion Gap  13     AST  11     Baso #  0.02     Basophil%  0.2     Total Bilirubin  0.3  Comment:  For infants and newborns, interpretation of results should be based  on gestational age, weight and in agreement with clinical  observations.  Premature Infant recommended reference ranges:  Up to 24 hours.............<8.0 mg/dL  Up to 48 hours............<12.0 mg/dL  3-5 days..................<15.0 mg/dL  6-29 days.................<15.0 mg/dL       BNP  108  Comment:  Values of less than 100 pg/ml are consistent with non-CHF populations.(H)     Site RR      BUN, Bld  6     Calcium  8.7     Chloride  89(L)     CO2  33(H)     Creatinine  0.8     Diastolic Dysfunction       DelSys Room Air      Differential Method  Automated     EF       eGFR if   >60     eGFR if non   American  >60  Comment:  Calculation used to obtain the estimated glomerular filtration  rate (eGFR) is the CKD-EPI equation.        Eos #  0.1     Eosinophil%  0.6     FiO2       Flow       Glucose  87     Gran # (ANC)  5.4     Gran%  57.0     Hematocrit  40.5     Hemoglobin  13.4     Lymph #  3.3     Lymph%  34.5     Magnesium       MCH  29.7     MCHC  33.1     MCV  90     Mode SPONT      Mono #  0.7     Mono%  7.7     MPV  9.4     Platelets  303     POC BE 9      POC HCO3 33.7(H)      POC PCO2 51.1(H)      POC PH 7.427      POC PO2 47(LL)      POC SATURATED O2 83(L)      Potassium  3.2(L)     Total Protein  7.4     RBC  4.51     RDW  14.5     Sample ARTERIAL      Sodium  135(L)     Troponin I  <0.006  Comment:  The reference interval for Troponin I represents the 99th percentile   cutoff   for our facility and is consistent with 3rd generation assay   performance.       WBC  9.53         All pertinent labs within the past 24 hours have been reviewed.    Significant Imaging: I have reviewed all pertinent imaging results/findings within the past 24 hours.

## 2018-03-14 NOTE — ASSESSMENT & PLAN NOTE
Bilateral lower extremity  Unclear etiology, patient reports no history of CHF    CXR showing vascular congestion/cardiomegaly c/w CHF  Check 2decho

## 2018-03-14 NOTE — NURSING
Received phone call from Jade [facility] ED notifying my that she will be bringing the patient up shortly. Patient is ready to be received to room 223.

## 2018-03-14 NOTE — DISCHARGE SUMMARY
[facility] Hospital Medicine  Discharge Summary      Patient Name: Esperanza Aquino  MRN: [id number]  Admission Date: 3/13/2018  Hospital Length of Stay: 0 days  Discharge Date and Time:  03/14/2018 5:56 PM  Attending Physician: No att. providers found   Discharging Provider: RONA Trejo  Primary Care Provider: JOVANNI Figueroa MD      HPI:   57F h/o COPD presents with bilateral lower extremity edema from urgent care.  Ongoing for 2 days.  Associated with SOB. Reports she has to use ventolin inhaler more often, several times a day.  She had been diagnosed with pneumonia recently per her landlord who was at bedside.  They live together. No family at bedside.  She is on home oxygen 3L and only uses occasionally.  Reports she still does smoke.  No other exacerbating or alleviating factors. She is a full code and her surrogate decision maker  Is her friend Bret Moise, [phone].          * No surgery found *      Hospital Course:   She was kept for OBS for Acute on chronic respiratory failure with hypoxia and hypercapnia under the care of Hospital Medicine. She was given IV furosemide and supplemental oxygen. She diuresed 2L of fluid and her breathing improved greatly. 2D echo was normal. Pt reports she is back to her baseline. She reports she was taking doxycycline for pneumonia but lost the bottle. Azithromycin rx given on discharge. Patient was seen and examined today and deemed stable for discharge home. She refused to use her home oxygen for the ride home saying she does not use it all the time. She also refused home health.      Consults:     No new Assessment & Plan notes have been filed under this hospital service since the last note was generated.  Service: Hospital Medicine    Final Active Diagnoses:    Diagnosis Date Noted POA    PRINCIPAL PROBLEM:  Acute on chronic respiratory failure with hypoxia and hypercapnia [J96.21, J96.22] 03/13/2018 Yes    Bipolar disorder,  current episode mixed, mild [F31.61] 01/24/2018 Yes    Essential hypertension [I10] 01/24/2018 Yes    Hypothyroid [E03.9] 01/24/2018 Yes    Tobacco abuse [Z72.0] 01/24/2018 Yes    Comfort measures only status [Z51.5] 03/14/2018 Not Applicable    Pedal edema [R60.0] 03/13/2018 Yes    COPD (chronic obstructive pulmonary disease) [J44.9] 01/24/2018 Yes      Problems Resolved During this Admission:    Diagnosis Date Noted Date Resolved POA       Discharged Condition: stable    Disposition: Home or Self Care    Follow Up:  Follow-up Information     C Inocencia Figueroa MD In 3 days.    Specialty:  Internal Medicine  Why:  hospital follow up               Patient Instructions:     Diet Cardiac     Activity as tolerated     Notify your health care provider if you experience any of the following:  difficulty breathing or increased cough         Significant Diagnostic Studies: Labs:   BMP:   Recent Labs  Lab 03/13/18  1754 03/14/18  0852   GLU 87 91   * 138   K 3.2* 3.0*   CL 89* 90*   CO2 33* 36*   BUN 6 6   CREATININE 0.8 0.7   CALCIUM 8.7 8.6*   MG  --  1.3*   , CMP   Recent Labs  Lab 03/13/18  1754 03/14/18  0852   * 138   K 3.2* 3.0*   CL 89* 90*   CO2 33* 36*   GLU 87 91   BUN 6 6   CREATININE 0.8 0.7   CALCIUM 8.7 8.6*   PROT 7.4  --    ALBUMIN 3.2*  --    BILITOT 0.3  --    ALKPHOS 68  --    AST 11  --    ALT 9*  --    ANIONGAP 13 12   ESTGFRAFRICA >60 >60   EGFRNONAA >60 >60   , CBC   Recent Labs  Lab 03/13/18  1754 03/14/18  0612   WBC 9.53 6.27   HGB 13.4 12.5   HCT 40.5 38.4    304    and All labs within the past 24 hours have been reviewed    Pending Diagnostic Studies:     None         Medications:  Reconciled Home Medications:   Discharge Medication List as of 3/14/2018  4:56 PM      START taking these medications    Details   azithromycin (Z-JONG) 250 MG tablet Take 2 tablets (500 mg total) by mouth once daily., Starting Wed 3/14/2018, Normal         CONTINUE these medications which have  CHANGED    Details   !! albuterol-ipratropium 2.5mg-0.5mg/3mL (DUO-NEB) 0.5 mg-3 mg(2.5 mg base)/3 mL nebulizer solution Take 3 mLs by nebulization every 4 (four) hours as needed for Wheezing. Rescue, Starting Wed 3/14/2018, Until Thu 3/14/2019, Normal       !! - Potential duplicate medications found. Please discuss with provider.      CONTINUE these medications which have NOT CHANGED    Details   atorvastatin (LIPITOR) 20 MG tablet Take 20 mg by mouth once daily., Historical Med      buPROPion (WELLBUTRIN XL) 300 MG 24 hr tablet Take 300 mg by mouth once daily., Historical Med      cyclobenzaprine (FLEXERIL) 10 MG tablet Take 10 mg by mouth 2 (two) times daily as needed for Muscle spasms., Historical Med      gabapentin (NEURONTIN) 300 MG capsule Take 300 mg by mouth 3 (three) times daily., Historical Med      hydroCHLOROthiazide (HYDRODIURIL) 25 MG tablet Take 25 mg by mouth once daily., Historical Med      hydrOXYzine HCl (ATARAX) 25 MG tablet Take 25 mg by mouth 4 (four) times daily., Historical Med      levothyroxine (SYNTHROID) 112 MCG tablet Take 112 mcg by mouth once daily., Historical Med      lorazepam (ATIVAN) 1 MG tablet Take 1 mg by mouth every 6 (six) hours as needed for Anxiety., Until Discontinued, Historical Med      metoprolol tartrate (LOPRESSOR) 50 MG tablet Take 50 mg by mouth 2 (two) times daily., Historical Med      OLANZapine (ZYPREXA) 20 MG tablet Take 20 mg by mouth every evening., Historical Med      omeprazole (PRILOSEC) 40 MG capsule Take 40 mg by mouth once daily., Historical Med      oxybutynin (DITROPAN) 5 MG Tab Take 5 mg by mouth 2 (two) times daily., Historical Med      promethazine (PHENERGAN) 25 MG tablet Take 25 mg by mouth every 4 (four) hours., Historical Med      valproic acid (DEPAKENE) 250 mg capsule Take 500 mg by mouth 2 (two) times daily. , Historical Med      ALBUTEROL SULFATE (VENTOLIN HFA INHL) Inhale into the lungs., Historical Med      !! albuterol-ipratropium  2.5mg-0.5mg/3mL (DUO-NEB) 0.5 mg-3 mg(2.5 mg base)/3 mL nebulizer solution Take 3 mLs by nebulization every 4 (four) hours as needed for Wheezing. Rescue, Starting Sat 1/27/2018, Normal      ferrous gluconate (FERGON) 324 MG tablet Take 1 tablet (324 mg total) by mouth 2 (two) times daily with meals., Starting 6/8/2015, Until Discontinued, OTC      fluticasone (FLONASE) 50 mcg/actuation nasal spray 1 spray by Each Nare route once daily., Historical Med      fluticasone-vilanterol (BREO ELLIPTA) 200-25 mcg/dose DsDv diskus inhaler Inhale 1 puff into the lungs once daily. Controller, Historical Med       !! - Potential duplicate medications found. Please discuss with provider.          Indwelling Lines/Drains at time of discharge:   Lines/Drains/Airways          No matching active lines, drains, or airways          Time spent on the discharge of patient: 60 minutes  Patient was seen and examined on the date of discharge and determined to be suitable for discharge.         RONA Trejo  Department of Hospital Medicine  [facility]

## 2018-03-14 NOTE — ASSESSMENT & PLAN NOTE
Normal pH  Co2 retainer, continues to smoke  On 3L home O2  Currently on 3L O2 per [place], keep sat>92%  Continue duoneb q6h,solumedrold bid for copd  Possible new CHF from CXR, check echo

## 2018-03-14 NOTE — NURSING
0959hr, Pt arrived to room 223, accompanied by ED staff. Leah [facility]R received report from Jade [facility]. I received report from Leah ORR at 1007hr. Pt placed on monitor.

## 2018-03-14 NOTE — H&P
[facility] Hospital Medicine  History & Physical    Patient Name: Esperanza Aquino  MRN: [id number]  Admission Date: 3/13/2018  Attending Physician: Kolton Peters MD  Primary Care Provider: JOVANNI Figueroa MD         Patient information was obtained from patient, landlord and ER records.     Subjective:     Principal Problem:Acute on chronic respiratory failure with hypoxia and hypercapnia    Chief Complaint:   Chief Complaint   Patient presents with    Leg Swelling     Sent from [facility] for bilat leg swelling and hypoxemia. -fever, +chills. Pt states she does not feel SOB.         HPI: 57F h/o COPD presents with bilateral lower extremity edema from urgent care.  Ongoing for 2 days.  Associated with SOB. Reports she has to use ventolin inhaler more often, several times a day.  She had been diagnosed with pneumonia recently per her landlord who was at bedside.  They live together. No family at bedside.  She is on home oxygen 3L and only uses occasionally.  Reports she still does smoke.  No other exacerbating or alleviating factors.           Past Medical History:   Diagnosis Date    Bipolar 1 disorder     not sure what type    Cancer     Uterine Cancer    COPD (chronic obstructive pulmonary disease)     Depression     Encounter for blood transfusion     Hypertension     Personal history of peptic ulcer disease     Pneumonia        Past Surgical History:   Procedure Laterality Date    APPENDECTOMY       SECTION      CHOLECYSTECTOMY      HERNIA REPAIR      HYSTERECTOMY         Review of patient's allergies indicates:   Allergen Reactions    Lithobid [lithium carbonate] Other (See Comments)     Severe depression    Sudafed [pseudoephedrine hcl] Other (See Comments)     Pt states she flat lined    Prednisone Other (See Comments)     Elevated BP    Prozac [fluoxetine] Swelling    Thorazine [chlorpromazine] Swelling       No current facility-administered medications on  file prior to encounter.      Current Outpatient Prescriptions on File Prior to Encounter   Medication Sig    atorvastatin (LIPITOR) 20 MG tablet Take 20 mg by mouth once daily.    buPROPion (WELLBUTRIN XL) 300 MG 24 hr tablet Take 300 mg by mouth once daily.    cyclobenzaprine (FLEXERIL) 10 MG tablet Take 10 mg by mouth 2 (two) times daily as needed for Muscle spasms.    gabapentin (NEURONTIN) 300 MG capsule Take 300 mg by mouth 3 (three) times daily.    hydroCHLOROthiazide (HYDRODIURIL) 25 MG tablet Take 25 mg by mouth once daily.    hydrOXYzine HCl (ATARAX) 25 MG tablet Take 25 mg by mouth 4 (four) times daily.    levothyroxine (SYNTHROID) 112 MCG tablet Take 112 mcg by mouth once daily.    lorazepam (ATIVAN) 1 MG tablet Take 1 mg by mouth every 6 (six) hours as needed for Anxiety.    metoprolol tartrate (LOPRESSOR) 50 MG tablet Take 50 mg by mouth 2 (two) times daily.    OLANZapine (ZYPREXA) 20 MG tablet Take 20 mg by mouth every evening.    omeprazole (PRILOSEC) 40 MG capsule Take 40 mg by mouth once daily.    oxybutynin (DITROPAN) 5 MG Tab Take 5 mg by mouth 2 (two) times daily.    promethazine (PHENERGAN) 25 MG tablet Take 25 mg by mouth every 4 (four) hours.    valproic acid (DEPAKENE) 250 mg capsule Take 500 mg by mouth 2 (two) times daily.     ALBUTEROL SULFATE (VENTOLIN HFA INHL) Inhale into the lungs.    albuterol-ipratropium 2.5mg-0.5mg/3mL (DUO-NEB) 0.5 mg-3 mg(2.5 mg base)/3 mL nebulizer solution Take 3 mLs by nebulization every 4 (four) hours as needed for Wheezing. Rescue    ferrous gluconate (FERGON) 324 MG tablet Take 1 tablet (324 mg total) by mouth 2 (two) times daily with meals.    fluticasone (FLONASE) 50 mcg/actuation nasal spray 1 spray by Each Nare route once daily.    fluticasone-vilanterol (BREO ELLIPTA) 200-25 mcg/dose DsDv diskus inhaler Inhale 1 puff into the lungs once daily. Controller     Family History     Problem Relation (Age of Onset)    Brain cancer Brother     Cancer Father, Brother    Lung cancer Father        Social History Main Topics    Smoking status: Current Every Day Smoker     Packs/day: 1.00     Years: 25.00     Types: Cigarettes     Last attempt to quit: 1/7/2015    Smokeless tobacco: Never Used      Comment: Patient now using E-Cigarrettes    Alcohol use No    Drug use: No    Sexual activity: Yes     Partners: Male     Review of Systems   Constitutional: Negative for chills, diaphoresis, fatigue, fever and unexpected weight change.   HENT: Negative for congestion, facial swelling, sore throat and trouble swallowing.    Eyes: Negative for photophobia, redness and visual disturbance.   Respiratory: Negative for apnea, cough, chest tightness, shortness of breath and wheezing.    Cardiovascular: Positive for leg swelling. Negative for chest pain and palpitations.   Gastrointestinal: Negative for abdominal distention, abdominal pain, blood in stool, constipation, diarrhea, nausea and vomiting.   Endocrine: Negative for polydipsia, polyphagia and polyuria.   Genitourinary: Negative for difficulty urinating, dysuria, flank pain, frequency, hematuria and urgency.   Musculoskeletal: Negative for arthralgias, back pain, joint swelling, myalgias and neck stiffness.   Skin: Negative for pallor and rash.   Allergic/Immunologic: Negative for immunocompromised state.   Neurological: Negative for dizziness, tremors, syncope, weakness, light-headedness and headaches.   Psychiatric/Behavioral: Negative for agitation, confusion and suicidal ideas.   All other systems reviewed and are negative.    Objective:     Vital Signs (Most Recent):  Temp: 98.5 °F (36.9 °C) (03/13/18 1930)  Pulse: 77 (03/13/18 2156)  Resp: 18 (03/13/18 2156)  BP: 117/63 (03/13/18 1930)  SpO2: (!) 94 % (03/13/18 2156) Vital Signs (24h Range):  Temp:  [97.6 °F (36.4 °C)-98.5 °F (36.9 °C)] 98.5 °F (36.9 °C)  Pulse:  [66-77] 77  Resp:  [17-22] 18  SpO2:  [88 %-95 %] 94 %  BP: (117-153)/(59-95) 117/63      Weight: 112.5 kg (247 lb 14.5 oz)  Body mass index is 41.25 kg/m².    Physical Exam   Constitutional: She is oriented to person, place, and time. She appears well-developed and well-nourished. No distress.   HENT:   Head: Normocephalic and atraumatic.   Eyes: Conjunctivae and EOM are normal. Pupils are equal, round, and reactive to light. No scleral icterus.   Neck: Normal range of motion. Neck supple. No JVD present. No thyromegaly present.   Cardiovascular: Normal rate, regular rhythm and intact distal pulses.  Exam reveals no gallop and no friction rub.    No murmur heard.  Pulmonary/Chest: Effort normal. No respiratory distress. She has no wheezes. She has rales (diffuse). She exhibits no tenderness.   Abdominal: Soft. Bowel sounds are normal. She exhibits no distension and no mass. There is no tenderness. There is no guarding.   Musculoskeletal: Normal range of motion. She exhibits edema (BLE pitting edema +1, up to ankles). She exhibits no tenderness.   Neurological: She is alert and oriented to person, place, and time. No cranial nerve deficit.   Skin: Skin is warm and dry. Capillary refill takes 2 to 3 seconds. No rash noted. She is not diaphoretic. No erythema.   Psychiatric: She has a normal mood and affect.   Nursing note and vitals reviewed.        CRANIAL NERVES     CN III, IV, VI   Pupils are equal, round, and reactive to light.  Extraocular motions are normal.        Significant Labs:   ABGs:   Recent Labs  Lab 03/13/18 2012   PH 7.432   PCO2 55.0*   HCO3 36.7*   POCSATURATED 92*   BE 12     BMP:   Recent Labs  Lab 03/13/18  1754   GLU 87   *   K 3.2*   CL 89*   CO2 33*   BUN 6   CREATININE 0.8   CALCIUM 8.7     CBC:   Recent Labs  Lab 03/13/18  1754   WBC 9.53   HGB 13.4   HCT 40.5        Magnesium: No results for input(s): MG in the last 48 hours.  TSH: No results for input(s): TSH in the last 4320 hours.  Urine Studies: No results for input(s): COLORU, APPEARANCEUA, PHUR,  SPECGRAV, PROTEINUA, GLUCUA, KETONESU, BILIRUBINUA, OCCULTUA, NITRITE, UROBILINOGEN, LEUKOCYTESUR, RBCUA, WBCUA, BACTERIA, SQUAMEPITHEL, HYALINECASTS in the last 48 hours.    Invalid input(s): KENDALL  All pertinent labs within the past 24 hours have been reviewed.    Significant Imaging: I have reviewed and interpreted all pertinent imaging results/findings within the past 24 hours.   Imaging Results          X-Ray Chest AP Portable (Final result)  Result time 03/13/18 18:23:08    Final result by Cayetano Gotti Jr., MD (03/13/18 18:23:08)                 Impression:     Mild CHF exacerbation.      Electronically signed by: CAYETANO GOTTI  Date:     03/13/18  Time:    18:23              Narrative:    Exam: Portable chest radiograph    History:    Shortness of breath    Comparison: 1/24/2018.    Findings: Mild central vascular congestion with interstitial edema.  No effusion or pneumothorax.  Cardiac silhouette remains enlarged.  Tortuous thoracic aorta.  Osseous structures intact.                                Assessment/Plan:     * Acute on chronic respiratory failure with hypoxia and hypercapnia    Normal pH  Co2 retainer, continues to smoke  On 3L home O2  Currently on 3L O2 per [place], keep sat>92%  Continue duoneb q6h,solumedrold bid for copd  Possible new CHF from CXR, check echo        Pedal edema    Bilateral lower extremity  Unclear etiology, patient reports no history of CHF    CXR showing vascular congestion/cardiomegaly c/w CHF  Check 2decho        Tobacco abuse    Recommend cessation          Essential hypertension    Continue metoprolol, hctz,           Bipolar disorder, current episode mixed, mild    On several medications, continue home meds which includes olanzapine, VPA, bupropion, ativan prn anziety            Hypothyroid    Check tsh  Continue synthroid at home dose       COPD  - continue symbicort bid, duoneb q6h     VTE Risk Mitigation         Ordered     enoxaparin injection 40 mg   Daily     Route:  Subcutaneous        03/13/18 2257     Medium Risk of VTE  Once      03/13/18 2257     Place ADITHYA hose  Until discontinued      03/13/18 2257     Place sequential compression device  Until discontinued      03/13/18 2257             Kolton Peters MD  Department of Hospital Medicine   [facility]

## 2018-03-14 NOTE — ED NOTES
Provider at bedside with patient. Patient informed that she will be admitted to hospital per Dr. Dumont.

## 2018-03-14 NOTE — NURSING
Pt given AVS summary. Pt verbalized understanding of d/c orders, making f/u appt with PCP,  and prescription antbx and nebs. Pt refused to home oxygen, she states that she does not where her oxygen continuously. Refused to where oxygen for transport home. Heart monitor returned to monitor tech.

## 2018-03-14 NOTE — HOSPITAL COURSE
She was kept for OBS for Acute on chronic respiratory failure with hypoxia and hypercapnia under the care of Hospital Medicine. She was given IV furosemide and supplemental oxygen. She diuresed 2L of fluid and her breathing improved greatly. 2D echo was normal. Pt reports she is back to her baseline. She reports she was taking doxycycline for pneumonia but lost the bottle. Azithromycin rx given on discharge. Patient was seen and examined today and deemed stable for discharge home. She refused to use her home oxygen for the ride home saying she does not use it all the time. She also refused home health.

## 2018-03-14 NOTE — ED NOTES
Pt. Noted sitting up at bedside on continuous monitor, AAOX3 with male visitor at bedside. Pt. States reason for ED visit was due to prolonged cough since 12/2017 with acute exacerbation of cough and SOB. Upon assessment,  Pt. Noted sitting up at foot of bed, respirations even and mildly labored. Pt. Positioned to comfort, in high cook position with head of bead elevated to assist with breathing. Breath sounds diminished throughout. Pt. Remains on continuous oxygen at 3 liters per nasal cannula. Pt. Noted with edema noted to bilateral lower extremities, (+) non pitting edema noted. Skin warm and dry, intact. Pt. Denies any cardiac symptoms at this time. Plan of care continued

## 2018-03-14 NOTE — ASSESSMENT & PLAN NOTE
Normal pH  Co2 retainer, continues to smoke  On 3L home O2  Currently on 3L O2 per [place], keep sat>92%  Continue duoneb q6h,solumedrold bid for copd  Possible new CHF from CXR, check echo    --echo pending  --supplemental oxygen to maintain sats >92%  --discussed smoking cessation in depth

## 2018-03-14 NOTE — ED NOTES
Pt. Noted lying in bed resting to comfort. Respirations even and unlabored, remains on continuous monitor with oxygen at 3 liters per nasal cannula. Male visitor at bedside. Awaiting bed placement. Admission orders reviewed. No action required at this time. Bed low, call bell in reach, side rails up x2, pt. Provided update of care. plan of care continued.

## 2018-10-12 ENCOUNTER — HOSPITAL ENCOUNTER (EMERGENCY)
Facility: HOSPITAL | Age: 58
Discharge: HOME | End: 2018-10-12
Attending: EMERGENCY MEDICINE
Payer: MEDICAID

## 2018-10-12 VITALS
BODY MASS INDEX: 38.32 KG/M2 | RESPIRATION RATE: 20 BRPM | WEIGHT: 230 LBS | HEART RATE: 92 BPM | OXYGEN SATURATION: 95 % | TEMPERATURE: 99 F | DIASTOLIC BLOOD PRESSURE: 81 MMHG | SYSTOLIC BLOOD PRESSURE: 159 MMHG | HEIGHT: 65 IN

## 2018-10-12 DIAGNOSIS — R07.81 RIB PAIN ON LEFT SIDE: ICD-10-CM

## 2018-10-12 DIAGNOSIS — R05.9 COUGH: ICD-10-CM

## 2018-10-12 DIAGNOSIS — K08.89 TOOTHACHE: Primary | ICD-10-CM

## 2018-10-12 PROCEDURE — 99283 EMERGENCY DEPT VISIT LOW MDM: CPT

## 2018-10-12 RX ORDER — PROMETHAZINE HYDROCHLORIDE AND DEXTROMETHORPHAN HYDROBROMIDE 6.25; 15 MG/5ML; MG/5ML
5 SYRUP ORAL EVERY 6 HOURS PRN
Qty: 120 ML | Refills: 0 | Status: SHIPPED | OUTPATIENT
Start: 2018-10-12 | End: 2018-10-22

## 2018-10-12 RX ORDER — CLINDAMYCIN HYDROCHLORIDE 150 MG/1
450 CAPSULE ORAL EVERY 8 HOURS
Qty: 45 CAPSULE | Refills: 0 | Status: SHIPPED | OUTPATIENT
Start: 2018-10-12 | End: 2018-10-17

## 2018-10-12 RX ORDER — NAPROXEN 375 MG/1
375 TABLET ORAL 2 TIMES DAILY WITH MEALS
Qty: 30 TABLET | Refills: 0 | Status: SHIPPED | OUTPATIENT
Start: 2018-10-12 | End: 2019-04-02 | Stop reason: ALTCHOICE

## 2018-10-12 NOTE — ED PROVIDER NOTES
SCRIBE #1 NOTE: I, Ludy Narvaez, am scribing for, and in the presence of, Steve Solis MD. I have scribed the entire note.      History      Chief Complaint   Patient presents with    Dental Pain     R side x 1 month    Back Pain       Review of patient's allergies indicates:   Allergen Reactions    Lithobid [lithium carbonate] Other (See Comments)     Severe depression    Sudafed [pseudoephedrine hcl] Other (See Comments)     Pt states she flat lined    Prednisone Other (See Comments)     Elevated BP    Prozac [fluoxetine] Swelling    Thorazine [chlorpromazine] Swelling        HPI   HPI    10/12/2018, 3:38 PM   History obtained from the patient      History of Present Illness: Esperanza Aquino is a 58 y.o. female patient with a PMHx of COPD, Bipolar disorder, HTN, PNA who presents to the Emergency Department for R upper dental pain which onset gradually 1 month PTA. Sxs are constant and moderate. No modifying factors. Pt is also c/o L thoracic back pain. She reports she had a crate fall on her 2 months ago and was diagnosed with rib Fx's on the L. No associated sxs. Pt denies any fever, chills, recent falls/trauma, abd pain, incontinence, saddle anesthesia, CP, SOB, extremity weakness/numbness, facial swelling, congestion, rhinorrhea, and all other sxs. Pt has oxygen at home. No further complaints or concerns.       Arrival mode: Personal vehicle      PCP: JOVANNI Figueroa MD       Past Medical History:  Past Medical History:   Diagnosis Date    Bipolar 1 disorder     not sure what type    Cancer     Uterine Cancer    COPD (chronic obstructive pulmonary disease)     Depression     Encounter for blood transfusion     Hypertension     Personal history of peptic ulcer disease     Pneumonia        Past Surgical History:  Past Surgical History:   Procedure Laterality Date    APPENDECTOMY       SECTION      CHOLECYSTECTOMY      COLONOSCOPY N/A 2015    Performed by [facility]es [facility] at  [facility]    ESOPHAGOGASTRODUODENOSCOPY (EGD) N/A 6/8/2015    [facility] at [facility]    HERNIA REPAIR      HYSTERECTOMY           Family History:  Family History   Problem Relation Age of Onset    Lung cancer Father     Cancer Father     Brain cancer Brother     Cancer Brother        Social History:  Social History     Tobacco Use    Smoking status: Current Every Day Smoker     Packs/day: 1.00     Years: 25.00     Pack years: 25.00     Types: Cigarettes     Last attempt to quit: 1/7/2015     Years since quitting: 3.7    Smokeless tobacco: Never Used    Tobacco comment: Patient now using E-Cigarrettes   Substance and Sexual Activity    Alcohol use: No    Drug use: No    Sexual activity: Yes     Partners: Male       ROS   Review of Systems   Constitutional: Negative for chills and fever.   HENT: Positive for dental problem (R upper). Negative for congestion, rhinorrhea and sore throat.    Respiratory: Negative for shortness of breath.    Cardiovascular: Negative for chest pain.   Gastrointestinal: Negative for abdominal pain, nausea and vomiting.   Genitourinary: Negative for dysuria.   Musculoskeletal: Positive for back pain (L thoracic). Negative for gait problem.   Skin: Negative for rash and wound.   Neurological: Negative for weakness and numbness.        (-) incontinence  (-) saddle anesthesia   Hematological: Does not bruise/bleed easily.   All other systems reviewed and are negative.    Physical Exam      Initial Vitals [10/12/18 1536]   BP Pulse Resp Temp SpO2   (!) 159/81 90 18 98.6 °F (37 °C) (!) 85 %      MAP       --          Physical Exam  Nursing Notes and Vital Signs Reviewed.  Constitutional: Patient is in no acute distress. Well-developed and well-nourished.  Head: Atraumatic. Normocephalic.  Eyes: PERRL. EOM intact. Conjunctivae are not pale. No scleral icterus.  ENT: Mucous membranes are moist. Oropharynx is clear and symmetric.    dental: Multiple missing teeth.  "Poor dentition. R upper jaw gum swelling and erythema. Patient handles secretions normally. No palpable fluctuance. No evidence of periodontal or periapical abscess. No trismus.   Neck: Supple. Full ROM. No lymphadenopathy.  Cardiovascular: Regular rate. Regular rhythm. No murmurs, rubs, or gallops. Distal pulses are 2+ and symmetric.  Pulmonary/Chest: No respiratory distress. Clear to auscultation bilaterally. No wheezing or rales.  Abdominal: Soft and non-distended.  There is no tenderness.  No rebound, guarding, or rigidity.   Musculoskeletal: Moves all extremities. No obvious deformities. No calf tenderness.  Back: L posterior rib tenderness. No midline bony tenderness, no deformities, no step-offs of the T-spine or L-spine.   Skin: Warm and dry.  Neurological:  Alert, awake, and appropriate.  Normal speech.  No acute focal neurological deficits are appreciated.  Psychiatric: Normal affect. Good eye contact. Appropriate in content.    ED Course    Procedures  ED Vital Signs:  Vitals:    10/12/18 1536   BP: (!) 159/81   Pulse: 90   Resp: 18   Temp: 98.6 °F (37 °C)   TempSrc: Oral   SpO2: (!) 85%   Weight: 104.3 kg (230 lb)   Height: 5' 5" (1.651 m)     Imaging Results:  Imaging Results          X-Ray Ribs 2 View Left (Final result)  Result time 10/12/18 16:20:11    Final result by MELANIE Carcamo Sr., MD (10/12/18 16:20:11)                 Impression:      1. There is no rib fracture visualized.  2. There is a nonunion fracture in the lateral aspect of the right clavicle.  3. The left border of the heart is silhouetted.  There is a mild amount of interstitial and alveolar opacities seen in the lower halves of both lungs with Kerley B-lines visualized bilaterally.  This is characteristic of pulmonary edema.  4. There are mild emphysematous changes in the upper portion of the lungs.  5. There are findings characteristic of a moderate size hiatal hernia.  .      Electronically signed by: Chino Carcamo, " MD  Date:    10/12/2018  Time:    16:20             Narrative:    EXAMINATION:  XR RIBS 2 VIEW LEFT    CLINICAL HISTORY:  Pleurodynia    COMPARISON:  A chest x-ray performed at 15:57 on 10/12/2018.    FINDINGS:  The left border of the heart is silhouetted.  There is a mild amount of interstitial and alveolar opacities seen in the lower halves of both lungs with Kerley B-lines visualized bilaterally.  There are mild emphysematous changes in the upper portion of the lungs.  There are findings characteristic of a moderate size hiatal hernia.  There is no pneumothorax.  The costophrenic angles are sharp.  There is no rib fracture visualized.  There is a nonunion fracture in the lateral aspect of the right clavicle.    .                               X-Ray Chest PA And Lateral (Final result)  Result time 10/12/18 16:12:51    Final result by MELANIE Carcamo Sr., MD (10/12/18 16:12:51)                 Impression:      1. The left border of the heart is silhouetted. There is no focal pulmonary infiltrate visualized in the region of the lingula on the lateral view.  If there is any clinical concern for a subtle pneumonia, I recommend consideration of a CT examination of the thorax with IV contrast.  2. There are findings associated with a moderate size hiatal hernia.  .      Electronically signed by: Chino Carcamo MD  Date:    10/12/2018  Time:    16:12             Narrative:    EXAMINATION:  XR CHEST PA AND LATERAL    CLINICAL HISTORY:  cough;    COMPARISON:  03/13/2018    FINDINGS:  The left border of the heart is silhouetted.  There is no focal pulmonary infiltrate visualized in the region of the lingula on the lateral view.  There are findings associated with a moderate size hiatal hernia.  The right lung is clear.  There is no pneumothorax or pleural effusion.                                        The Emergency Provider reviewed the vital signs and test results, which are outlined above.    ED Discussion     4:27  PM: Reassessed pt at this time. Pt is AAOx3, in NAD, and VSS. Discussed with pt all pertinent ED information and results. Discussed pt dx and plan of tx. Gave pt all f/u and return to the ED instructions. All questions and concerns were addressed at this time. Pt expresses understanding of information and instructions, and is comfortable with plan to discharge. Pt is stable for discharge.  Patient is on oxygen at home, and does not feel short of breath.      I discussed with patient and/or family/caretaker that evaluation in the ED does not suggest any emergent or life threatening medical conditions requiring immediate intervention beyond what was provided in the ED, and I believe patient is safe for discharge.  Regardless, an unremarkable evaluation in the ED does not preclude the development or presence of a serious of life threatening condition. As such, patient was instructed to return immediately for any worsening or change in current symptoms.    ED Medication(s):  Medications - No data to display    Follow-up Information     C Inocencia Figueroa MD.    Specialty:  Internal Medicine                 Current Discharge Medication List      START taking these medications    Details   clindamycin (CLEOCIN) 150 MG capsule Take 3 capsules (450 mg total) by mouth every 8 (eight) hours. for 5 days  Qty: 45 capsule, Refills: 0      naproxen (NAPROSYN) 375 MG tablet Take 1 tablet (375 mg total) by mouth 2 (two) times daily with meals.  Qty: 30 tablet, Refills: 0      promethazine-dextromethorphan (PROMETHAZINE-DM) 6.25-15 mg/5 mL Syrp Take 5 mLs by mouth every 6 (six) hours as needed.  Qty: 120 mL, Refills: 0             Medical Decision Making    Medical Decision Making:   Clinical Tests:   Radiological Study: Ordered and Reviewed           Scribe Attestation:   Scribe #1: I performed the above scribed service and the documentation accurately describes the services I performed. I attest to the accuracy of the  note.    Attending:   Physician Attestation Statement for Scribe #1: I, Steve Solis MD, personally performed the services described in this documentation, as scribed by Ludy Narvaez, in my presence, and it is both accurate and complete.          Clinical Impression       ICD-10-CM ICD-9-CM   1. Toothache K08.89 525.9   2. Rib pain on left side R07.81 786.50   3. Cough R05 786.2       Disposition:   Disposition: Discharged  Condition: Stable         Steve Solis MD  10/12/18 0184

## 2018-11-20 ENCOUNTER — HOSPITAL ENCOUNTER (EMERGENCY)
Facility: HOSPITAL | Age: 58
Discharge: HOME | End: 2018-11-20
Attending: EMERGENCY MEDICINE
Payer: MEDICAID

## 2018-11-20 VITALS
WEIGHT: 249.81 LBS | OXYGEN SATURATION: 91 % | TEMPERATURE: 98 F | RESPIRATION RATE: 22 BRPM | SYSTOLIC BLOOD PRESSURE: 130 MMHG | BODY MASS INDEX: 41.57 KG/M2 | DIASTOLIC BLOOD PRESSURE: 82 MMHG | HEART RATE: 76 BPM

## 2018-11-20 DIAGNOSIS — M79.605 LEFT LEG PAIN: Primary | ICD-10-CM

## 2018-11-20 DIAGNOSIS — M79.606 LEG PAIN: ICD-10-CM

## 2018-11-20 DIAGNOSIS — E66.01 MORBID OBESITY: ICD-10-CM

## 2018-11-20 DIAGNOSIS — Z99.81 SUPPLEMENTAL OXYGEN DEPENDENT: ICD-10-CM

## 2018-11-20 DIAGNOSIS — I83.93 SPIDER VEINS OF BOTH LOWER EXTREMITIES: ICD-10-CM

## 2018-11-20 PROCEDURE — 99284 EMERGENCY DEPT VISIT MOD MDM: CPT

## 2018-11-20 NOTE — ED PROVIDER NOTES
SCRIBE #1 NOTE: I, Suleman Daigle, am scribing for, and in the presence of, Tawana Cobb MD. I have scribed the entire note.       History     Chief Complaint   Patient presents with    Leg Pain     Pt reports pain to left thigh and was told by her to doctor to come get checked for blood clot     Review of patient's allergies indicates:   Allergen Reactions    Lithobid [lithium carbonate] Other (See Comments)     Severe depression    Sudafed [pseudoephedrine hcl] Other (See Comments)     Pt states she flat lined    Prednisone Other (See Comments)     Elevated BP    Prozac [fluoxetine] Swelling    Thorazine [chlorpromazine] Swelling         History of Present Illness     HPI    11/20/2018, 4:39 PM  History obtained from the patient      History of Present Illness: Esperanza Aquino is a 58 y.o. female patient with a PMHx of COPD, HTN, and pneumonia who presents to the Emergency Department for evaluation of of left leg pain/swelling which onset suddenly a few days ago. [facility] and told her sxs could be due to a DVT. Pt was advised to come in for further evaluation. Pt denies trauma and reports she has an appointment with her PCP on 12/3. Reports burning sensation but no numbness or weakness.  Symptoms are constant and moderate in severity. Exacerbated by bearing weight and relieved by nothing. No associated sxs. Patient denies any fever, chills, CP, SOB, weakness/numbness, diaphoresis, and all other sxs at this time. Pt denies tx PTA. No further complaints or concerns at this time.   She has home oxygen, denies being short of breath.  Wears oxygen as needed.   Arrival mode: Personal vehicle    PCP: Jaron Damon MD        Past Medical History:  Past Medical History:   Diagnosis Date    Bipolar 1 disorder     not sure what type    Cancer     Uterine Cancer    COPD (chronic obstructive pulmonary disease)     Depression     Encounter for blood transfusion      Hypertension     Personal history of peptic ulcer disease     Pneumonia        Past Surgical History:  Past Surgical History:   Procedure Laterality Date    APPENDECTOMY       SECTION      CHOLECYSTECTOMY      COLONOSCOPY N/A 2015    [facility]    ESOPHAGOGASTRODUODENOSCOPY (EGD) N/A 2015    [facility]    HERNIA REPAIR      HYSTERECTOMY           Family History:  Family History   Problem Relation Age of Onset    Lung cancer Father     Cancer Father     Brain cancer Brother     Cancer Brother        Social History:  Social History     Tobacco Use    Smoking status: Current Every Day Smoker     Packs/day: 1.00     Years: 25.00     Pack years: 25.00     Types: Cigarettes     Last attempt to quit: 2015     Years since quitting: 3.8    Smokeless tobacco: Never Used    Tobacco comment: Patient now using E-Cigarrettes   Substance and Sexual Activity    Alcohol use: No    Drug use: No    Sexual activity: Yes     Partners: Male        Review of Systems     Review of Systems   Constitutional: Negative for chills, diaphoresis and fever.   HENT: Negative for congestion and sore throat.    Eyes: Negative for photophobia and visual disturbance.   Respiratory: Negative for cough and shortness of breath.    Cardiovascular: Positive for leg swelling. Negative for chest pain and palpitations.   Gastrointestinal: Negative for abdominal pain, nausea and vomiting.   Genitourinary: Negative for dysuria, frequency, hematuria and urgency.   Musculoskeletal: Positive for myalgias (Left leg pain). Negative for back pain, gait problem and neck pain.   Skin: Negative for rash and wound.   Neurological: Negative for dizziness, weakness, light-headedness and headaches.   Hematological: Does not bruise/bleed easily.   Psychiatric/Behavioral: Negative for confusion.   All other systems reviewed and are negative.     Physical Exam     Initial  Vitals [11/20/18 1520]   BP Pulse Resp Temp SpO2   (!) 189/100 85 20 98.4 °F (36.9 °C) 97 %      MAP       --          Physical Exam  Nursing Notes and Vital Signs Reviewed.  Constitutional: Patient is in no acute distress. Morbidly obese.   Head: Atraumatic. Normocephalic.  Eyes: PERRL. EOM intact. Conjunctivae are not pale. No scleral icterus.  ENT: Mucous membranes are moist.    Neck: Supple. Full ROM. No lymphadenopathy.  Cardiovascular: Regular rate. Regular rhythm. No murmurs, rubs, or gallops.   Pulmonary/Chest: No respiratory distress. Clear to auscultation bilaterally. No wheezing or rales.  Abdominal: Soft and non-distended.  There is no tenderness.   Musculoskeletal: Moves all extremities. No obvious deformities. No edema. No calf tenderness/swelling. Spider veins noted. Small varicosities noted, no significant tenderness upon palpation. NVI distally.   Skin: Warm and dry.  Neurological:  Alert, awake, and appropriate.  Normal speech.  No acute focal neurological deficits are appreciated.  Psychiatric: Normal affect. Good eye contact. Appropriate in content.     ED Course   Procedures  ED Vital Signs:  Vitals:    11/20/18 1520 11/20/18 1639   BP: (!) 189/100 136/74   Pulse: 85 84   Resp: 20 (!) 22   Temp: 98.4 °F (36.9 °C)    TempSrc: Oral    SpO2: 97% (!) 91%   Weight: 113.3 kg (249 lb 12.5 oz)        Imaging Results:  Imaging Results          US Lower Extremity Veins Left (Final result)  Result time 11/20/18 17:53:27    Final result by Cristo Rothman III, MD (11/20/18 17:53:27)                 Impression:      Negative for  left lower extremity DVT.      Electronically signed by: Cristo Rothman MD  Date:    11/20/2018  Time:    17:53             Narrative:    EXAMINATION:  US LOWER EXTREMITY VEINS LEFT    CLINICAL HISTORY:  Pain in leg, unspecified    FINDINGS:  Grayscale and color Doppler sonography was formed through the  left  lower extremity    The  left lower extremity veins are widely  patent with normal augmentation, and compressibility and respiratory variability.  Left peroneal veins not seen.                                      The Emergency Provider reviewed the vital signs and test results, which are outlined above.     ED Discussion     6:00 PM: Reassessed pt at this time. Pt is awake, alert, and in NAD. Discussed with pt all pertinent ED information and results. Discussed pt dx and plan of tx. Gave pt all f/u and return to the ED instructions. All questions and concerns were addressed at this time. Pt expresses understanding of information and instructions, and is comfortable with plan to discharge. Pt is stable for discharge.    I discussed with patient and/or family/caretaker that evaluation in the ED does not suggest any emergent or life threatening medical conditions requiring immediate intervention beyond what was provided in the ED, and I believe patient is safe for discharge.  Regardless, an unremarkable evaluation in the ED does not preclude the development or presence of a serious of life threatening condition. As such, patient was instructed to return immediately for any worsening or change in current symptoms.      ED Medication(s):  Medications - No data to display      Follow-up Information     Jaron Damon MD. Schedule an appointment as soon as possible for a visit in 2 days.    Specialty:  Family Medicine  Why:  Return to the Emergency Room, If symptoms worsen  Contact information:  [address] [facility]  [place] [id number] [phone]                                     Scribe Attestation:   Scribe #1: I performed the above scribed service and the documentation accurately describes the services I performed. I attest to the accuracy of the note.     Attending:   Physician Attestation Statement for Scribe #1: I, Tawana Cobb MD, personally performed the services described in this documentation, as scribed by Suleman Daigle, in my presence, and it is both accurate  and complete.           Clinical Impression       ICD-10-CM ICD-9-CM   1. Left leg pain M79.605 729.5   2. Leg pain M79.606 729.5   3. Morbid obesity E66.01 278.01   4. Supplemental oxygen dependent Z99.81 V46.2   5. Spider veins of both lower extremities I83.93 454.9       Disposition:   Disposition: Discharged  Condition: Stable         Tawana Cobb MD  11/20/18 7385

## 2019-01-13 ENCOUNTER — HOSPITAL ENCOUNTER (EMERGENCY)
Facility: HOSPITAL | Age: 59
Discharge: HOME | End: 2019-01-13
Attending: INTERNAL MEDICINE
Payer: MEDICAID

## 2019-01-13 VITALS
DIASTOLIC BLOOD PRESSURE: 73 MMHG | RESPIRATION RATE: 20 BRPM | HEIGHT: 65 IN | TEMPERATURE: 98 F | OXYGEN SATURATION: 95 % | WEIGHT: 253.5 LBS | BODY MASS INDEX: 42.24 KG/M2 | SYSTOLIC BLOOD PRESSURE: 153 MMHG | HEART RATE: 84 BPM

## 2019-01-13 DIAGNOSIS — R03.0 ELEVATED BLOOD PRESSURE READING: ICD-10-CM

## 2019-01-13 DIAGNOSIS — M53.9 MULTILEVEL DEGENERATIVE DISC DISEASE: ICD-10-CM

## 2019-01-13 DIAGNOSIS — M54.50 LOW BACK PAIN AT MULTIPLE SITES: Primary | ICD-10-CM

## 2019-01-13 PROCEDURE — 25000003 PHARM REV CODE 250: Performed by: REGISTERED NURSE

## 2019-01-13 PROCEDURE — 99283 EMERGENCY DEPT VISIT LOW MDM: CPT

## 2019-01-13 RX ORDER — HYDROCODONE BITARTRATE AND ACETAMINOPHEN 10; 325 MG/1; MG/1
1 TABLET ORAL
Status: COMPLETED | OUTPATIENT
Start: 2019-01-13 | End: 2019-01-13

## 2019-01-13 RX ORDER — TRAMADOL HYDROCHLORIDE 50 MG/1
50 TABLET ORAL EVERY 6 HOURS PRN
Qty: 12 TABLET | Refills: 0 | Status: SHIPPED | OUTPATIENT
Start: 2019-01-13 | End: 2019-03-07 | Stop reason: SDUPTHER

## 2019-01-13 RX ADMIN — HYDROCODONE BITARTRATE AND ACETAMINOPHEN 1 TABLET: 10; 325 TABLET ORAL at 02:01

## 2019-01-13 NOTE — ED PROVIDER NOTES
"     HISTORY     Chief Complaint   Patient presents with    Back Pain     Pt states, "I am having severe back pain."     Review of patient's allergies indicates:   Allergen Reactions    Lithobid [lithium carbonate] Other (See Comments)     Severe depression    Sudafed [pseudoephedrine hcl] Other (See Comments)     Pt states she flat lined    Prednisone Other (See Comments)     Elevated BP    Prozac [fluoxetine] Swelling    Thorazine [chlorpromazine] Swelling        HPI   The history is provided by the patient.   Back Pain    This is a chronic problem. The current episode started several days ago. The problem occurs constantly. The problem has been unchanged. The pain is associated with no known injury. The pain is present in the lumbar spine. The quality of the pain is described as shooting and aching. The pain radiates to the right leg. The pain is at a severity of 8/10. The symptoms are aggravated by bending, twisting and certain positions. The pain is worse during the day. Pertinent negatives include no chest pain, no fever, no numbness, no abdominal pain, no bowel incontinence, no perianal numbness, no bladder incontinence, no dysuria and no weakness. She has tried analgesics, muscle relaxants and walking for the symptoms. The treatment provided no relief. Risk factors include obesity and a sedentary lifestyle.        PCP: Jaron Damon MD     Past Medical History:  Past Medical History:   Diagnosis Date    Bipolar 1 disorder     not sure what type    Cancer     Uterine Cancer    COPD (chronic obstructive pulmonary disease)     Depression     Encounter for blood transfusion     Hypertension     Personal history of peptic ulcer disease     Pneumonia         Past Surgical History:  Past Surgical History:   Procedure Laterality Date    APPENDECTOMY       SECTION      CHOLECYSTECTOMY      COLONOSCOPY N/A 2015    [facility]    " ESOPHAGOGASTRODUODENOSCOPY (EGD) N/A 2015    [facility]    HERNIA REPAIR      HYSTERECTOMY          Family History:  Family History   Problem Relation Age of Onset    Lung cancer Father     Cancer Father     Brain cancer Brother     Cancer Brother         Social History:  Social History     Tobacco Use    Smoking status: Current Every Day Smoker     Packs/day: 1.00     Years: 25.00     Pack years: 25.00     Types: Cigarettes     Last attempt to quit: 2015     Years since quittin.0    Smokeless tobacco: Never Used    Tobacco comment: Patient now using E-Cigarrettes   Substance and Sexual Activity    Alcohol use: No    Drug use: No    Sexual activity: Yes     Partners: Male         ROS   Review of Systems   Constitutional: Negative for fever.   HENT: Negative for sore throat.    Respiratory: Negative for shortness of breath.    Cardiovascular: Negative for chest pain.   Gastrointestinal: Negative for abdominal pain, bowel incontinence and nausea.   Genitourinary: Negative for bladder incontinence and dysuria.   Musculoskeletal: Positive for back pain.   Skin: Negative for rash.   Neurological: Negative for weakness and numbness.   Hematological: Does not bruise/bleed easily.   All other systems reviewed and are negative.      PHYSICAL EXAM     Initial Vitals [19 1415]   BP Pulse Resp Temp SpO2   (!) 153/73 84 20 98 °F (36.7 °C) 95 %      MAP       --           Physical Exam    Constitutional: She appears well-developed and well-nourished. She is not diaphoretic. She is Obese . No distress.   HENT:   Head: Normocephalic and atraumatic.   Eyes: Conjunctivae and EOM are normal. Pupils are equal, round, and reactive to light.   Neck: Normal range of motion. Neck supple.   Cardiovascular: Normal rate, regular rhythm and normal heart sounds.   No murmur heard.  Pulmonary/Chest: Breath sounds normal. No respiratory distress. She has no wheezes. She has no rales.  "  Abdominal: Soft. Bowel sounds are normal. There is no tenderness. There is no rebound and no guarding.   Musculoskeletal: Normal range of motion. She exhibits no edema.        Lumbar back: She exhibits tenderness and spasm. She exhibits no bony tenderness.        Back:    Neurological: She is alert and oriented to person, place, and time. No cranial nerve deficit. GCS score is 15. GCS eye subscore is 4. GCS verbal subscore is 5. GCS motor subscore is 6.   Skin: Skin is warm and dry. Capillary refill takes less than 2 seconds.   Psychiatric: She has a normal mood and affect. Thought content normal.          ED COURSE   Procedures  ED ONGOING VITALS:  Vitals:    01/13/19 1415   BP: (!) 153/73   Pulse: 84   Resp: 20   Temp: 98 °F (36.7 °C)   TempSrc: Oral   SpO2: 95%   Weight: 115 kg (253 lb 8.5 oz)   Height: 5' 5" (1.651 m)         ABNORMAL LAB VALUES:  Labs Reviewed - No data to display      ALL LAB VALUES:        RADIOLOGY STUDIES:  Imaging Results    None                   The above vital signs and test results have been reviewed by the emergency provider.     ED Medications:  Current Discharge Medication List      START taking these medications    Details   traMADol (ULTRAM) 50 mg tablet Take 1 tablet (50 mg total) by mouth every 6 (six) hours as needed for Pain.  Qty: 12 tablet, Refills: 0           Discharge Medications:  This SmartLink is deprecated. Use AVOptifreezeEDLIST instead to display the medication list for a patient.   Follow-up Information     Jaron Damon MD In 1 week.    Specialty:  Family Medicine  Contact information:  [address] [place] [address] [phone]                  2:28 PM: Reassessed pt at this time.  Pt states her condition has improved at this time. Discussed with pt all pertinent ED information and results. Discussed pt dx and plan of tx. Gave pt all f/u and return to the ED instructions. All questions and concerns were addressed at this time. Pt expresses understanding " of information and instructions, and is comfortable with plan to discharge. Pt is stable for discharge.      I discussed with patient and/or family/caretaker that evaluation in the ED does not suggest any emergent or life threatening medical conditions requiring immediate intervention beyond what was provided in the ED, and I believe patient is safe for discharge. Regardless, an unremarkable evaluation in the ED does not preclude the development or presence of a serious or life threatening condition. As such, patient was instructed to return immediately for any worsening or change in current symptoms.        MEDICAL DECISION MAKING       Additional MDM:   Hypertension: The patient has hypertension (no treatment required at this time). The patient's condition was felt to be stable.            CLINICAL IMPRESSION       ICD-10-CM ICD-9-CM   1. Low back pain at multiple sites M54.5 724.2   2. Multilevel degenerative disc disease M53.9 722.6   3. Elevated blood pressure reading R03.0 796.2               Randy Salmeron Jr., [facility]  01/13/19 1922

## 2019-04-02 ENCOUNTER — HOSPITAL ENCOUNTER (INPATIENT)
Facility: HOSPITAL | Age: 59
LOS: 3 days | Discharge: HOME | DRG: 193 | End: 2019-04-05
Attending: EMERGENCY MEDICINE | Admitting: INTERNAL MEDICINE
Payer: MEDICAID

## 2019-04-02 DIAGNOSIS — R09.02 HYPOXEMIA: ICD-10-CM

## 2019-04-02 DIAGNOSIS — J18.9 PNEUMONIA OF LEFT LOWER LOBE DUE TO INFECTIOUS ORGANISM: ICD-10-CM

## 2019-04-02 DIAGNOSIS — J96.21 ACUTE ON CHRONIC RESPIRATORY FAILURE WITH HYPOXIA: Primary | ICD-10-CM

## 2019-04-02 DIAGNOSIS — R06.02 SOB (SHORTNESS OF BREATH): ICD-10-CM

## 2019-04-02 DIAGNOSIS — R73.03 PREDIABETES: ICD-10-CM

## 2019-04-02 LAB
ALLENS TEST: ABNORMAL
DELSYS: ABNORMAL
FIO2: 21
HCO3 UR-SCNC: 27.1 MMOL/L (ref 24–28)
MODE: ABNORMAL
PCO2 BLDA: 35.8 MMHG (ref 35–45)
PH SMN: 7.49 [PH] (ref 7.35–7.45)
PO2 BLDA: 42 MMHG (ref 80–100)
POC BE: 4 MMOL/L
POC SATURATED O2: 82 % (ref 95–100)
SAMPLE: ABNORMAL
SITE: ABNORMAL

## 2019-04-02 PROCEDURE — 93005 ELECTROCARDIOGRAM TRACING: CPT

## 2019-04-02 PROCEDURE — 93010 EKG 12-LEAD: ICD-10-PCS | Mod: 77,,, | Performed by: INTERNAL MEDICINE

## 2019-04-02 PROCEDURE — 99900035 HC TECH TIME PER 15 MIN (STAT)

## 2019-04-02 PROCEDURE — 93010 ELECTROCARDIOGRAM REPORT: CPT | Mod: 77,,, | Performed by: INTERNAL MEDICINE

## 2019-04-02 PROCEDURE — 82803 BLOOD GASES ANY COMBINATION: CPT

## 2019-04-02 PROCEDURE — 96361 HYDRATE IV INFUSION ADD-ON: CPT

## 2019-04-02 PROCEDURE — 96374 THER/PROPH/DIAG INJ IV PUSH: CPT

## 2019-04-02 PROCEDURE — 99285 EMERGENCY DEPT VISIT HI MDM: CPT | Mod: 25

## 2019-04-02 PROCEDURE — 11000001 HC ACUTE MED/SURG PRIVATE ROOM

## 2019-04-02 PROCEDURE — 36600 WITHDRAWAL OF ARTERIAL BLOOD: CPT

## 2019-04-02 RX ORDER — ASPIRIN 325 MG
325 TABLET ORAL
Status: COMPLETED | OUTPATIENT
Start: 2019-04-03 | End: 2019-04-02

## 2019-04-02 RX ADMIN — ASPIRIN 325 MG ORAL TABLET 325 MG: 325 PILL ORAL at 11:04

## 2019-04-03 PROBLEM — J44.1 CHRONIC OBSTRUCTIVE PULMONARY DISEASE WITH ACUTE EXACERBATION: Status: ACTIVE | Noted: 2018-01-24

## 2019-04-03 PROBLEM — J44.9 SEVERE CHRONIC OBSTRUCTIVE PULMONARY DISEASE: Status: ACTIVE | Noted: 2019-04-03

## 2019-04-03 PROBLEM — Z72.0 TOBACCO ABUSE: Chronic | Status: ACTIVE | Noted: 2018-01-24

## 2019-04-03 PROBLEM — E83.42 HYPOMAGNESEMIA: Status: ACTIVE | Noted: 2019-04-03

## 2019-04-03 PROBLEM — I10 ESSENTIAL HYPERTENSION: Chronic | Status: ACTIVE | Noted: 2018-01-24

## 2019-04-03 PROBLEM — J44.89 CHRONIC OBSTRUCTIVE BRONCHITIS: Status: ACTIVE | Noted: 2019-04-03

## 2019-04-03 PROBLEM — N32.81 OVERACTIVE BLADDER: Status: ACTIVE | Noted: 2019-04-03

## 2019-04-03 PROBLEM — E87.6 HYPOKALEMIA: Status: ACTIVE | Noted: 2019-04-03

## 2019-04-03 PROBLEM — R09.02 HYPOXEMIA: Status: ACTIVE | Noted: 2019-04-03

## 2019-04-03 PROBLEM — E87.1 HYPONATREMIA: Status: ACTIVE | Noted: 2019-04-03

## 2019-04-03 LAB
ALBUMIN SERPL BCP-MCNC: 3 G/DL (ref 3.5–5.2)
ALP SERPL-CCNC: 66 U/L (ref 55–135)
ALT SERPL W/O P-5'-P-CCNC: 11 U/L (ref 10–44)
ANION GAP SERPL CALC-SCNC: 13 MMOL/L (ref 8–16)
ANION GAP SERPL CALC-SCNC: 9 MMOL/L (ref 8–16)
AST SERPL-CCNC: 10 U/L (ref 10–40)
BASOPHILS # BLD AUTO: 0.02 K/UL (ref 0–0.2)
BASOPHILS # BLD AUTO: 0.02 K/UL (ref 0–0.2)
BASOPHILS NFR BLD: 0.1 % (ref 0–1.9)
BASOPHILS NFR BLD: 0.2 % (ref 0–1.9)
BILIRUB SERPL-MCNC: 0.6 MG/DL (ref 0.1–1)
BILIRUB UR QL STRIP: NEGATIVE
BNP SERPL-MCNC: 24 PG/ML (ref 0–99)
BUN SERPL-MCNC: 10 MG/DL (ref 6–20)
BUN SERPL-MCNC: 12 MG/DL (ref 6–20)
CALCIUM SERPL-MCNC: 8.3 MG/DL (ref 8.7–10.5)
CALCIUM SERPL-MCNC: 9.9 MG/DL (ref 8.7–10.5)
CHLORIDE SERPL-SCNC: 100 MMOL/L (ref 95–110)
CHLORIDE SERPL-SCNC: 92 MMOL/L (ref 95–110)
CLARITY UR: CLEAR
CO2 SERPL-SCNC: 26 MMOL/L (ref 23–29)
CO2 SERPL-SCNC: 26 MMOL/L (ref 23–29)
COLOR UR: YELLOW
CREAT SERPL-MCNC: 0.7 MG/DL (ref 0.5–1.4)
CREAT SERPL-MCNC: 0.7 MG/DL (ref 0.5–1.4)
DIFFERENTIAL METHOD: ABNORMAL
DIFFERENTIAL METHOD: ABNORMAL
EOSINOPHIL # BLD AUTO: 0.1 K/UL (ref 0–0.5)
EOSINOPHIL # BLD AUTO: 0.1 K/UL (ref 0–0.5)
EOSINOPHIL NFR BLD: 0.8 % (ref 0–8)
EOSINOPHIL NFR BLD: 0.9 % (ref 0–8)
ERYTHROCYTE [DISTWIDTH] IN BLOOD BY AUTOMATED COUNT: 13.2 % (ref 11.5–14.5)
ERYTHROCYTE [DISTWIDTH] IN BLOOD BY AUTOMATED COUNT: 13.3 % (ref 11.5–14.5)
EST. GFR  (AFRICAN AMERICAN): >60 ML/MIN/1.73 M^2
EST. GFR  (AFRICAN AMERICAN): >60 ML/MIN/1.73 M^2
EST. GFR  (NON AFRICAN AMERICAN): >60 ML/MIN/1.73 M^2
EST. GFR  (NON AFRICAN AMERICAN): >60 ML/MIN/1.73 M^2
GLUCOSE SERPL-MCNC: 103 MG/DL (ref 70–110)
GLUCOSE SERPL-MCNC: 109 MG/DL (ref 70–110)
GLUCOSE UR QL STRIP: NEGATIVE
HCT VFR BLD AUTO: 36.8 % (ref 37–48.5)
HCT VFR BLD AUTO: 39.3 % (ref 37–48.5)
HGB BLD-MCNC: 11.9 G/DL (ref 12–16)
HGB BLD-MCNC: 13.7 G/DL (ref 12–16)
HGB UR QL STRIP: NEGATIVE
INFLUENZA A, MOLECULAR: NEGATIVE
INFLUENZA B, MOLECULAR: NEGATIVE
INR PPP: 1 (ref 0.8–1.2)
KETONES UR QL STRIP: NEGATIVE
LACTATE SERPL-SCNC: 1.4 MMOL/L (ref 0.5–2.2)
LEUKOCYTE ESTERASE UR QL STRIP: NEGATIVE
LIPASE SERPL-CCNC: <4 U/L (ref 4–60)
LYMPHOCYTES # BLD AUTO: 1.9 K/UL (ref 1–4.8)
LYMPHOCYTES # BLD AUTO: 2.4 K/UL (ref 1–4.8)
LYMPHOCYTES NFR BLD: 17.7 % (ref 18–48)
LYMPHOCYTES NFR BLD: 18.9 % (ref 18–48)
MAGNESIUM SERPL-MCNC: 1.2 MG/DL (ref 1.6–2.6)
MAGNESIUM SERPL-MCNC: 1.2 MG/DL (ref 1.6–2.6)
MCH RBC QN AUTO: 30.5 PG (ref 27–31)
MCH RBC QN AUTO: 32 PG (ref 27–31)
MCHC RBC AUTO-ENTMCNC: 32.3 G/DL (ref 32–36)
MCHC RBC AUTO-ENTMCNC: 34.9 G/DL (ref 32–36)
MCV RBC AUTO: 92 FL (ref 82–98)
MCV RBC AUTO: 94 FL (ref 82–98)
MONOCYTES # BLD AUTO: 1 K/UL (ref 0.3–1)
MONOCYTES # BLD AUTO: 1.4 K/UL (ref 0.3–1)
MONOCYTES NFR BLD: 10.1 % (ref 4–15)
MONOCYTES NFR BLD: 9.9 % (ref 4–15)
NEUTROPHILS # BLD AUTO: 7.2 K/UL (ref 1.8–7.7)
NEUTROPHILS # BLD AUTO: 9.5 K/UL (ref 1.8–7.7)
NEUTROPHILS NFR BLD: 70.1 % (ref 38–73)
NEUTROPHILS NFR BLD: 71.3 % (ref 38–73)
NITRITE UR QL STRIP: NEGATIVE
PH UR STRIP: 6 [PH] (ref 5–8)
PHOSPHATE SERPL-MCNC: 4.3 MG/DL (ref 2.7–4.5)
PLATELET # BLD AUTO: 231 K/UL (ref 150–350)
PLATELET # BLD AUTO: 236 K/UL (ref 150–350)
PMV BLD AUTO: 9 FL (ref 9.2–12.9)
PMV BLD AUTO: 9.3 FL (ref 9.2–12.9)
POTASSIUM SERPL-SCNC: 2.9 MMOL/L (ref 3.5–5.1)
POTASSIUM SERPL-SCNC: 3.2 MMOL/L (ref 3.5–5.1)
PROCALCITONIN SERPL IA-MCNC: 5.58 NG/ML
PROT SERPL-MCNC: 7.8 G/DL (ref 6–8.4)
PROT UR QL STRIP: NEGATIVE
PROTHROMBIN TIME: 11.1 SEC (ref 9–12.5)
RBC # BLD AUTO: 3.9 M/UL (ref 4–5.4)
RBC # BLD AUTO: 4.28 M/UL (ref 4–5.4)
SODIUM SERPL-SCNC: 131 MMOL/L (ref 136–145)
SODIUM SERPL-SCNC: 135 MMOL/L (ref 136–145)
SP GR UR STRIP: 1.01 (ref 1–1.03)
SPECIMEN SOURCE: NORMAL
TROPONIN I SERPL DL<=0.01 NG/ML-MCNC: 0.01 NG/ML (ref 0–0.03)
TROPONIN I SERPL DL<=0.01 NG/ML-MCNC: <0.006 NG/ML (ref 0–0.03)
URN SPEC COLLECT METH UR: NORMAL
UROBILINOGEN UR STRIP-ACNC: NEGATIVE EU/DL
WBC # BLD AUTO: 10.22 K/UL (ref 3.9–12.7)
WBC # BLD AUTO: 13.34 K/UL (ref 3.9–12.7)

## 2019-04-03 PROCEDURE — 87040 BLOOD CULTURE FOR BACTERIA: CPT | Mod: 59

## 2019-04-03 PROCEDURE — 83690 ASSAY OF LIPASE: CPT

## 2019-04-03 PROCEDURE — 27000221 HC OXYGEN, UP TO 24 HOURS

## 2019-04-03 PROCEDURE — 99900035 HC TECH TIME PER 15 MIN (STAT)

## 2019-04-03 PROCEDURE — 94640 AIRWAY INHALATION TREATMENT: CPT

## 2019-04-03 PROCEDURE — 83880 ASSAY OF NATRIURETIC PEPTIDE: CPT

## 2019-04-03 PROCEDURE — 36415 COLL VENOUS BLD VENIPUNCTURE: CPT

## 2019-04-03 PROCEDURE — 84484 ASSAY OF TROPONIN QUANT: CPT | Mod: 91

## 2019-04-03 PROCEDURE — 83735 ASSAY OF MAGNESIUM: CPT

## 2019-04-03 PROCEDURE — 80053 COMPREHEN METABOLIC PANEL: CPT

## 2019-04-03 PROCEDURE — 84484 ASSAY OF TROPONIN QUANT: CPT

## 2019-04-03 PROCEDURE — 11000001 HC ACUTE MED/SURG PRIVATE ROOM

## 2019-04-03 PROCEDURE — 85025 COMPLETE CBC W/AUTO DIFF WBC: CPT | Mod: 91

## 2019-04-03 PROCEDURE — 87502 INFLUENZA DNA AMP PROBE: CPT

## 2019-04-03 PROCEDURE — 63600175 PHARM REV CODE 636 W HCPCS: Performed by: INTERNAL MEDICINE

## 2019-04-03 PROCEDURE — 63600175 PHARM REV CODE 636 W HCPCS: Performed by: EMERGENCY MEDICINE

## 2019-04-03 PROCEDURE — 25000003 PHARM REV CODE 250: Performed by: EMERGENCY MEDICINE

## 2019-04-03 PROCEDURE — 21400001 HC TELEMETRY ROOM

## 2019-04-03 PROCEDURE — 63600175 PHARM REV CODE 636 W HCPCS: Performed by: NURSE PRACTITIONER

## 2019-04-03 PROCEDURE — 25000003 PHARM REV CODE 250: Performed by: INTERNAL MEDICINE

## 2019-04-03 PROCEDURE — 27000646 HC AEROBIKA DEVICE

## 2019-04-03 PROCEDURE — 83735 ASSAY OF MAGNESIUM: CPT | Mod: 91

## 2019-04-03 PROCEDURE — 25000242 PHARM REV CODE 250 ALT 637 W/ HCPCS: Performed by: NURSE PRACTITIONER

## 2019-04-03 PROCEDURE — 25000003 PHARM REV CODE 250: Performed by: NURSE PRACTITIONER

## 2019-04-03 PROCEDURE — 85610 PROTHROMBIN TIME: CPT

## 2019-04-03 PROCEDURE — 80048 BASIC METABOLIC PNL TOTAL CA: CPT

## 2019-04-03 PROCEDURE — 84145 PROCALCITONIN (PCT): CPT

## 2019-04-03 PROCEDURE — 81003 URINALYSIS AUTO W/O SCOPE: CPT

## 2019-04-03 PROCEDURE — 96372 THER/PROPH/DIAG INJ SC/IM: CPT

## 2019-04-03 PROCEDURE — 84100 ASSAY OF PHOSPHORUS: CPT

## 2019-04-03 PROCEDURE — 25000242 PHARM REV CODE 250 ALT 637 W/ HCPCS: Performed by: INTERNAL MEDICINE

## 2019-04-03 PROCEDURE — 83605 ASSAY OF LACTIC ACID: CPT

## 2019-04-03 PROCEDURE — 94664 DEMO&/EVAL PT USE INHALER: CPT

## 2019-04-03 RX ORDER — IPRATROPIUM BROMIDE AND ALBUTEROL SULFATE 2.5; .5 MG/3ML; MG/3ML
3 SOLUTION RESPIRATORY (INHALATION) EVERY 6 HOURS PRN
Status: DISCONTINUED | OUTPATIENT
Start: 2019-04-03 | End: 2019-04-05 | Stop reason: HOSPADM

## 2019-04-03 RX ORDER — AMLODIPINE BESYLATE 5 MG/1
5 TABLET ORAL DAILY
Status: DISCONTINUED | OUTPATIENT
Start: 2019-04-03 | End: 2019-04-04

## 2019-04-03 RX ORDER — SODIUM CHLORIDE 0.9 % (FLUSH) 0.9 %
10 SYRINGE (ML) INJECTION
Status: DISCONTINUED | OUTPATIENT
Start: 2019-04-03 | End: 2019-04-05 | Stop reason: HOSPADM

## 2019-04-03 RX ORDER — OLANZAPINE 5 MG/1
10 TABLET ORAL NIGHTLY
Status: DISCONTINUED | OUTPATIENT
Start: 2019-04-03 | End: 2019-04-05 | Stop reason: HOSPADM

## 2019-04-03 RX ORDER — ACETAMINOPHEN 500 MG
1000 TABLET ORAL
Status: COMPLETED | OUTPATIENT
Start: 2019-04-03 | End: 2019-04-03

## 2019-04-03 RX ORDER — POLYETHYLENE GLYCOL 3350 17 G/17G
17 POWDER, FOR SOLUTION ORAL DAILY
Status: DISCONTINUED | OUTPATIENT
Start: 2019-04-03 | End: 2019-04-05 | Stop reason: HOSPADM

## 2019-04-03 RX ORDER — OXYBUTYNIN CHLORIDE 5 MG/1
5 TABLET ORAL 2 TIMES DAILY
Status: DISCONTINUED | OUTPATIENT
Start: 2019-04-03 | End: 2019-04-05 | Stop reason: HOSPADM

## 2019-04-03 RX ORDER — BUDESONIDE 0.5 MG/2ML
0.5 INHALANT ORAL EVERY 12 HOURS
Status: DISCONTINUED | OUTPATIENT
Start: 2019-04-03 | End: 2019-04-05 | Stop reason: HOSPADM

## 2019-04-03 RX ORDER — ENOXAPARIN SODIUM 100 MG/ML
40 INJECTION SUBCUTANEOUS EVERY 24 HOURS
Status: DISCONTINUED | OUTPATIENT
Start: 2019-04-03 | End: 2019-04-05 | Stop reason: HOSPADM

## 2019-04-03 RX ORDER — GUAIFENESIN 600 MG/1
600 TABLET, EXTENDED RELEASE ORAL 2 TIMES DAILY
Status: DISCONTINUED | OUTPATIENT
Start: 2019-04-03 | End: 2019-04-05 | Stop reason: HOSPADM

## 2019-04-03 RX ORDER — POTASSIUM CHLORIDE 20 MEQ/15ML
40 SOLUTION ORAL ONCE
Status: COMPLETED | OUTPATIENT
Start: 2019-04-03 | End: 2019-04-03

## 2019-04-03 RX ORDER — ARFORMOTEROL TARTRATE 15 UG/2ML
15 SOLUTION RESPIRATORY (INHALATION) 2 TIMES DAILY
Status: DISCONTINUED | OUTPATIENT
Start: 2019-04-03 | End: 2019-04-05 | Stop reason: HOSPADM

## 2019-04-03 RX ORDER — MONTELUKAST SODIUM 10 MG/1
10 TABLET ORAL DAILY
Status: DISCONTINUED | OUTPATIENT
Start: 2019-04-03 | End: 2019-04-05 | Stop reason: HOSPADM

## 2019-04-03 RX ORDER — LORAZEPAM 0.5 MG/1
1 TABLET ORAL EVERY 8 HOURS PRN
Status: DISCONTINUED | OUTPATIENT
Start: 2019-04-03 | End: 2019-04-05 | Stop reason: HOSPADM

## 2019-04-03 RX ORDER — CETIRIZINE HYDROCHLORIDE 10 MG/1
10 TABLET ORAL DAILY
Status: DISCONTINUED | OUTPATIENT
Start: 2019-04-03 | End: 2019-04-05 | Stop reason: HOSPADM

## 2019-04-03 RX ORDER — PANTOPRAZOLE SODIUM 40 MG/1
40 TABLET, DELAYED RELEASE ORAL DAILY
Status: DISCONTINUED | OUTPATIENT
Start: 2019-04-03 | End: 2019-04-05 | Stop reason: HOSPADM

## 2019-04-03 RX ORDER — MAGNESIUM SULFATE HEPTAHYDRATE 40 MG/ML
4 INJECTION, SOLUTION INTRAVENOUS ONCE
Status: COMPLETED | OUTPATIENT
Start: 2019-04-03 | End: 2019-04-03

## 2019-04-03 RX ORDER — BACLOFEN 10 MG/1
10 TABLET ORAL 3 TIMES DAILY
Status: DISCONTINUED | OUTPATIENT
Start: 2019-04-03 | End: 2019-04-03

## 2019-04-03 RX ORDER — DIVALPROEX SODIUM 500 MG/1
500 TABLET, DELAYED RELEASE ORAL EVERY MORNING
Status: DISCONTINUED | OUTPATIENT
Start: 2019-04-03 | End: 2019-04-05 | Stop reason: HOSPADM

## 2019-04-03 RX ORDER — IPRATROPIUM BROMIDE AND ALBUTEROL SULFATE 2.5; .5 MG/3ML; MG/3ML
3 SOLUTION RESPIRATORY (INHALATION)
Status: DISCONTINUED | OUTPATIENT
Start: 2019-04-03 | End: 2019-04-05 | Stop reason: HOSPADM

## 2019-04-03 RX ORDER — ACETAMINOPHEN 325 MG/1
650 TABLET ORAL EVERY 6 HOURS PRN
Status: DISCONTINUED | OUTPATIENT
Start: 2019-04-03 | End: 2019-04-05 | Stop reason: HOSPADM

## 2019-04-03 RX ORDER — FERROUS GLUCONATE 324(38)MG
324 TABLET ORAL 2 TIMES DAILY WITH MEALS
Status: DISCONTINUED | OUTPATIENT
Start: 2019-04-03 | End: 2019-04-05 | Stop reason: HOSPADM

## 2019-04-03 RX ORDER — BUPROPION HYDROCHLORIDE 150 MG/1
300 TABLET ORAL DAILY
Status: DISCONTINUED | OUTPATIENT
Start: 2019-04-03 | End: 2019-04-05 | Stop reason: HOSPADM

## 2019-04-03 RX ORDER — FLUTICASONE PROPIONATE 50 MCG
2 SPRAY, SUSPENSION (ML) NASAL DAILY
Status: DISCONTINUED | OUTPATIENT
Start: 2019-04-03 | End: 2019-04-05 | Stop reason: HOSPADM

## 2019-04-03 RX ORDER — LEVOTHYROXINE SODIUM 112 UG/1
112 TABLET ORAL
Status: DISCONTINUED | OUTPATIENT
Start: 2019-04-03 | End: 2019-04-05 | Stop reason: HOSPADM

## 2019-04-03 RX ORDER — HYDRALAZINE HYDROCHLORIDE 20 MG/ML
10 INJECTION INTRAMUSCULAR; INTRAVENOUS EVERY 6 HOURS PRN
Status: DISCONTINUED | OUTPATIENT
Start: 2019-04-03 | End: 2019-04-05 | Stop reason: HOSPADM

## 2019-04-03 RX ORDER — FLUTICASONE FUROATE AND VILANTEROL 200; 25 UG/1; UG/1
1 POWDER RESPIRATORY (INHALATION) DAILY
Status: DISCONTINUED | OUTPATIENT
Start: 2019-04-03 | End: 2019-04-03

## 2019-04-03 RX ORDER — FUROSEMIDE 10 MG/ML
40 INJECTION INTRAMUSCULAR; INTRAVENOUS ONCE
Status: COMPLETED | OUTPATIENT
Start: 2019-04-03 | End: 2019-04-03

## 2019-04-03 RX ORDER — LEVOFLOXACIN 5 MG/ML
750 INJECTION, SOLUTION INTRAVENOUS
Status: COMPLETED | OUTPATIENT
Start: 2019-04-03 | End: 2019-04-03

## 2019-04-03 RX ORDER — METOPROLOL TARTRATE 50 MG/1
50 TABLET ORAL 2 TIMES DAILY
Status: DISCONTINUED | OUTPATIENT
Start: 2019-04-03 | End: 2019-04-05 | Stop reason: HOSPADM

## 2019-04-03 RX ORDER — POTASSIUM CHLORIDE 20 MEQ/15ML
40 SOLUTION ORAL
Status: COMPLETED | OUTPATIENT
Start: 2019-04-03 | End: 2019-04-03

## 2019-04-03 RX ORDER — DIVALPROEX SODIUM 500 MG/1
1000 TABLET, DELAYED RELEASE ORAL NIGHTLY
Status: DISCONTINUED | OUTPATIENT
Start: 2019-04-03 | End: 2019-04-05 | Stop reason: HOSPADM

## 2019-04-03 RX ORDER — ATORVASTATIN CALCIUM 10 MG/1
20 TABLET, FILM COATED ORAL NIGHTLY
Status: DISCONTINUED | OUTPATIENT
Start: 2019-04-03 | End: 2019-04-05 | Stop reason: HOSPADM

## 2019-04-03 RX ORDER — ONDANSETRON 2 MG/ML
4 INJECTION INTRAMUSCULAR; INTRAVENOUS EVERY 6 HOURS PRN
Status: DISCONTINUED | OUTPATIENT
Start: 2019-04-03 | End: 2019-04-05 | Stop reason: HOSPADM

## 2019-04-03 RX ADMIN — SODIUM CHLORIDE 3333 ML: 0.9 INJECTION, SOLUTION INTRAVENOUS at 12:04

## 2019-04-03 RX ADMIN — ENOXAPARIN SODIUM 40 MG: 100 INJECTION SUBCUTANEOUS at 04:04

## 2019-04-03 RX ADMIN — POLYETHYLENE GLYCOL 3350 17 G: 17 POWDER, FOR SOLUTION ORAL at 08:04

## 2019-04-03 RX ADMIN — LEVOFLOXACIN 750 MG: 750 INJECTION, SOLUTION INTRAVENOUS at 02:04

## 2019-04-03 RX ADMIN — METOPROLOL TARTRATE 50 MG: 50 TABLET ORAL at 09:04

## 2019-04-03 RX ADMIN — DIVALPROEX SODIUM 1000 MG: 500 TABLET, DELAYED RELEASE ORAL at 09:04

## 2019-04-03 RX ADMIN — POTASSIUM CHLORIDE 40 MEQ: 20 SOLUTION ORAL at 06:04

## 2019-04-03 RX ADMIN — AZITHROMYCIN MONOHYDRATE 500 MG: 500 INJECTION, POWDER, LYOPHILIZED, FOR SOLUTION INTRAVENOUS at 08:04

## 2019-04-03 RX ADMIN — OLANZAPINE 10 MG: 5 TABLET, FILM COATED ORAL at 09:04

## 2019-04-03 RX ADMIN — OXYBUTYNIN CHLORIDE 5 MG: 5 TABLET ORAL at 09:04

## 2019-04-03 RX ADMIN — BUDESONIDE 0.5 MG: 0.5 SUSPENSION RESPIRATORY (INHALATION) at 07:04

## 2019-04-03 RX ADMIN — OXYBUTYNIN CHLORIDE 5 MG: 5 TABLET ORAL at 08:04

## 2019-04-03 RX ADMIN — PANTOPRAZOLE SODIUM 40 MG: 40 TABLET, DELAYED RELEASE ORAL at 08:04

## 2019-04-03 RX ADMIN — FERROUS GLUCONATE TAB 324 MG (37.5 MG ELEMENTAL IRON) 324 MG: 324 (37.5 FE) TAB at 08:04

## 2019-04-03 RX ADMIN — ACETAMINOPHEN 1000 MG: 500 TABLET ORAL at 12:04

## 2019-04-03 RX ADMIN — IPRATROPIUM BROMIDE AND ALBUTEROL SULFATE 3 ML: .5; 3 SOLUTION RESPIRATORY (INHALATION) at 01:04

## 2019-04-03 RX ADMIN — METHYLPREDNISOLONE SODIUM SUCCINATE 40 MG: 40 INJECTION, POWDER, FOR SOLUTION INTRAMUSCULAR; INTRAVENOUS at 09:04

## 2019-04-03 RX ADMIN — BUPROPION HYDROCHLORIDE 300 MG: 150 TABLET, EXTENDED RELEASE ORAL at 08:04

## 2019-04-03 RX ADMIN — IPRATROPIUM BROMIDE AND ALBUTEROL SULFATE 3 ML: .5; 3 SOLUTION RESPIRATORY (INHALATION) at 07:04

## 2019-04-03 RX ADMIN — ATORVASTATIN CALCIUM 20 MG: 10 TABLET, FILM COATED ORAL at 09:04

## 2019-04-03 RX ADMIN — MAGNESIUM SULFATE IN WATER 4 G: 40 INJECTION, SOLUTION INTRAVENOUS at 05:04

## 2019-04-03 RX ADMIN — ARFORMOTEROL TARTRATE 15 MCG: 15 SOLUTION RESPIRATORY (INHALATION) at 07:04

## 2019-04-03 RX ADMIN — METHYLPREDNISOLONE SODIUM SUCCINATE 40 MG: 40 INJECTION, POWDER, FOR SOLUTION INTRAMUSCULAR; INTRAVENOUS at 02:04

## 2019-04-03 RX ADMIN — CEFTRIAXONE 1 G: 1 INJECTION, SOLUTION INTRAVENOUS at 08:04

## 2019-04-03 RX ADMIN — GUAIFENESIN 600 MG: 600 TABLET, EXTENDED RELEASE ORAL at 08:04

## 2019-04-03 RX ADMIN — FLUTICASONE PROPIONATE 100 MCG: 50 SPRAY, METERED NASAL at 08:04

## 2019-04-03 RX ADMIN — LEVOTHYROXINE SODIUM 112 MCG: 112 TABLET ORAL at 05:04

## 2019-04-03 RX ADMIN — MONTELUKAST SODIUM 10 MG: 10 TABLET, FILM COATED ORAL at 08:04

## 2019-04-03 RX ADMIN — LORAZEPAM 1 MG: 0.5 TABLET ORAL at 07:04

## 2019-04-03 RX ADMIN — FUROSEMIDE 40 MG: 10 INJECTION, SOLUTION INTRAVENOUS at 05:04

## 2019-04-03 RX ADMIN — CETIRIZINE HYDROCHLORIDE 10 MG: 10 TABLET, FILM COATED ORAL at 08:04

## 2019-04-03 RX ADMIN — POTASSIUM CHLORIDE 40 MEQ: 20 SOLUTION ORAL at 05:04

## 2019-04-03 RX ADMIN — METOPROLOL TARTRATE 50 MG: 50 TABLET ORAL at 08:04

## 2019-04-03 RX ADMIN — DIVALPROEX SODIUM 500 MG: 500 TABLET, DELAYED RELEASE ORAL at 06:04

## 2019-04-03 RX ADMIN — AMLODIPINE BESYLATE 5 MG: 5 TABLET ORAL at 02:04

## 2019-04-03 RX ADMIN — POTASSIUM CHLORIDE 40 MEQ: 20 SOLUTION ORAL at 02:04

## 2019-04-03 RX ADMIN — BACLOFEN 10 MG: 10 TABLET ORAL at 08:04

## 2019-04-03 RX ADMIN — GUAIFENESIN 600 MG: 600 TABLET, EXTENDED RELEASE ORAL at 09:04

## 2019-04-03 RX ADMIN — FERROUS GLUCONATE TAB 324 MG (37.5 MG ELEMENTAL IRON) 324 MG: 324 (37.5 FE) TAB at 04:04

## 2019-04-03 RX ADMIN — OLANZAPINE 10 MG: 5 TABLET, FILM COATED ORAL at 05:04

## 2019-04-03 NOTE — PLAN OF CARE
Problem: Adult Inpatient Plan of Care  Goal: Plan of Care Review  Outcome: Ongoing (interventions implemented as appropriate)  Patient remains free of falls and safety precautions maintained. Afebrile. No complaints of pain. NSR. IV abx per order. 2L NC. Will continue to monitor. 12 hour chart check.

## 2019-04-03 NOTE — H&P
[facility] Hospital Medicine  History & Physical    Patient Name: Esperanza Aquino  MRN: [id number]  Admission Date: 4/3/2019  Attending Physician: Tana Jack MD   Primary Care Provider: Jaron Damon MD         Patient information was obtained from patient, past medical records and ER records.     Subjective:     Principal Problem:Acute on chronic respiratory failure with hypoxia    Chief Complaint:   Chief Complaint   Patient presents with    Shortness of Breath     Pt SOB and coughing up green mucous        HPI: Esperanza Aquino is a 58 y.o. female patient with a PMHx of HTN, severe COPD, bipolar disorder, and continued tobacco smoking, who presented to the Emergency Department with c/o SOB   that has progressively worsened over the past 4-5 days.  Associated congestion, cough  producing green sputum, and chills.  No aggravating or relieving factors.  Denies any CP,  palpitations, orthopnea, PND, edema, ABD pain, N/V/D, dizziness, syncope, or fever.  She is on home O2 @ 3L NC PRN, but admits to noncompliance.  She admits to using E-Vapor and smoking cigarettes.  Upon arrival to ED, patient hypoxic with O2 sat 87% on room air.  She was placed on 2 L nasal cannula and DuoNeb treatment given with improved O2 sat of 95%.  Initial workup resulted WBC 13, lactic 1.4, procalcitonin 5.58, troponin negative, BNP 24, K 2.9, Na 131, Mg 1.2.  ABG resulted pH 7.488, pCO2 35.8, pO2 42, SaO2 82 on room air. CXR showed left lower lobe infiltrate consistent with PNA.  Hospital Medicine was called for admission.      Past Medical History:   Diagnosis Date    Bipolar 1 disorder     not sure what type    Cancer     Uterine Cancer    COPD (chronic obstructive pulmonary disease)     Depression     Encounter for blood transfusion     Hypertension     Personal history of peptic ulcer disease     Pneumonia        Past Surgical History:   Procedure Laterality Date    APPENDECTOMY        SECTION      CHOLECYSTECTOMY      COLONOSCOPY N/A 6/8/2015    Performed by [facility]    ESOPHAGOGASTRODUODENOSCOPY (EGD) N/A 6/8/2015    Performed by [facility] at [facility] ENDO    HERNIA REPAIR      HYSTERECTOMY         Review of patient's allergies indicates:   Allergen Reactions    Lithobid [lithium carbonate] Other (See Comments)     Severe depression    Sudafed [pseudoephedrine hcl] Other (See Comments)     Pt states she flat lined    Prednisone Other (See Comments)     Elevated BP    Prozac [fluoxetine] Swelling    Thorazine [chlorpromazine] Swelling       No current facility-administered medications on file prior to encounter.      Current Outpatient Medications on File Prior to Encounter   Medication Sig    albuterol 90 mcg/actuation inhaler INHALE 2 PUFFS INTO THE LUNGS EVERY 8 HOURS    baclofen (LIORESAL) 10 MG tablet TAKE 1 TABLET BY MOUTH THREE TIMES PER DAY AS DIRECTED    buPROPion (WELLBUTRIN XL) 300 MG 24 hr tablet Take 1 tablet by mouth every morning    cyclobenzaprine (FLEXERIL) 10 MG tablet TAKE 1 TABLET BY MOUTH TWICE DAILY AS NEEDED    divalproex ER (DEPAKOTE) 500 MG Tb24 Take 1 TABLET  IN THE MORNING AND 2 AT BEDTIME    ferrous gluconate (FERGON) 324 MG tablet Take 1 tablet (324 mg total) by mouth 2 (two) times daily with meals.    fluticasone-vilanterol (BREO) 200-25 mcg/dose DsDv diskus inhaler use 1 inhalation by mouth according to package directions    hydroCHLOROthiazide (HYDRODIURIL) 25 MG tablet TAKE ONE TABLET BY MOUTH DAILY    hydroCHLOROthiazide (HYDRODIURIL) 25 MG tablet TAKE 1 TABLET BY MOUTH ONCE DAILY    hydrOXYzine HCl (ATARAX) 25 MG tablet Take 1 tablet by mouth four times daily.    levothyroxine (SYNTHROID) 112 MCG tablet TAKE ONE TABLET BY MOUTH EVERY DAY    loratadine (CLARITIN) 10 mg tablet TAKE ONE TABLET BY MOUTH EVERY DAY    LORazepam (ATIVAN) 1 MG tablet Take 1 tablet by mouth three times daily    metoprolol tartrate  (LOPRESSOR) 50 MG tablet TAKE 1 TABLET BY MOUTH TWICE PER DAY    montelukast (SINGULAIR) 10 mg tablet TAKE ONE TABLET BY MOUTH EVERY DAY    OLANZapine (ZYPREXA) 20 MG tablet Take 1 tablet by mouth every night at bedtime    omeprazole (PRILOSEC) 40 MG capsule TAKE ONE CAPSULE BY MOUTH EVERY DAY    oxybutynin (DITROPAN) 5 MG Tab TAKE 1 TABLET BY MOUTH TWICE DAILY    polyethylene glycol (GLYCOLAX) 17 gram/dose powder MIX AND DRINK 17 GRAMS IN 8 OUNCES OF JUICE OR WATER ACCORDING TO PACKAGE DIRECTIONS    traMADol (ULTRAM) 50 mg tablet Take 1 tablet by mouth three times daily    [DISCONTINUED] baclofen (LIORESAL) 10 MG tablet TAKE ONE TABLET BY MOUTH THREE TIMES DAILY    [DISCONTINUED] buPROPion (WELLBUTRIN XL) 300 MG 24 hr tablet TAKE ONE TABLET BY MOUTH EVERY MORNING    [DISCONTINUED] divalproex (DEPAKOTE) 500 MG TbEC TAKE ONE TABLET BY MOUTH IN THE MORNING AND TWO TABLETS AT BEDTIME    [DISCONTINUED] hydrOXYzine HCl (ATARAX) 25 MG tablet TAKE ONE TABLET BY MOUTH FOUR TIMES DAILY    [DISCONTINUED] LORazepam (ATIVAN) 1 MG tablet TAKE ONE TABLET BY MOUTH THREE TIMES DAILY    [DISCONTINUED] OLANZapine (ZYPREXA) 20 MG tablet Take 1 tablet by mouth every night at bedtime    albuterol-ipratropium 2.5mg-0.5mg/3mL (DUO-NEB) 0.5 mg-3 mg(2.5 mg base)/3 mL nebulizer solution Take 3 mLs by nebulization every 4 (four) hours as needed for Wheezing. Rescue    atorvastatin (LIPITOR) 20 MG tablet TAKE ONE TABLET BY MOUTH AT BEDTIME    fluticasone (FLONASE) 50 mcg/actuation nasal spray USE 2 SPRAYS IN EACH NOSTRIL EVERY DAY    [DISCONTINUED] buPROPion (WELLBUTRIN XL) 300 MG 24 hr tablet TAKE ONE TABLET BY MOUTH ONCE DAILY IN THE MORNING    [DISCONTINUED] buPROPion (WELLBUTRIN XL) 300 MG 24 hr tablet Take 1 tablet by mouth every morning    [DISCONTINUED] buPROPion (WELLBUTRIN XL) 300 MG 24 hr tablet Take 1 tablet(s) every day by oral route in the morning for 90 days.    [DISCONTINUED] divalproex (DEPAKOTE) 500 MG TbEC  TAKE 1 TABLET BY MOUTH IN THE MORNING AND 2 TABLETS AT BEDTIME    [DISCONTINUED] divalproex ER (DEPAKOTE) 500 MG Tb24 Take 1 TABLET  by mouth in the morning and 2 at bedtime.    [DISCONTINUED] hydrOXYzine HCl (ATARAX) 25 MG tablet TAKE ONE TABLET BY MOUTH FOUR TIMES DAILY    [DISCONTINUED] hydrOXYzine HCl (ATARAX) 25 MG tablet Take 1 tablet by mouth four times daily    [DISCONTINUED] OLANZapine (ZYPREXA) 5 MG tablet TAKE ONE TABLET BY MOUTH EVERY DAY AT BEDTIME    [DISCONTINUED] OLANZapine (ZYPREXA) 5 MG tablet TAKE ONE TABLET BY MOUTH AT BEDTIME     Family History     Problem Relation (Age of Onset)    Brain cancer Brother    Cancer Father, Brother    Lung cancer Father        Tobacco Use    Smoking status: Current Every Day Smoker     Packs/day: 1.00     Years: 25.00     Pack years: 25.00     Types: Cigarettes, Vaping with nicotine     Last attempt to quit: 2015     Years since quittin.2    Smokeless tobacco: Never Used    Tobacco comment: Patient now using E-Cigarrettes   Substance and Sexual Activity    Alcohol use: No    Drug use: No    Sexual activity: Yes     Partners: Male     Review of Systems   Constitutional: Positive for chills. Negative for diaphoresis, fatigue, fever and unexpected weight change.   HENT: Positive for congestion. Negative for postnasal drip, rhinorrhea, sinus pressure and sore throat.    Respiratory: Positive for cough and shortness of breath. Negative for chest tightness and wheezing.    Cardiovascular: Negative for chest pain, palpitations and leg swelling.   Gastrointestinal: Negative for abdominal distention, abdominal pain, blood in stool, constipation, diarrhea, nausea and vomiting.   Genitourinary: Negative for dysuria, frequency, hematuria and urgency.   Musculoskeletal: Negative for arthralgias, back pain and myalgias.   Skin: Negative for pallor, rash and wound.   Neurological: Negative for dizziness, syncope, weakness, light-headedness, numbness and  headaches.   Psychiatric/Behavioral: Negative for confusion. The patient is not nervous/anxious.    All other systems reviewed and are negative.    Objective:     Vital Signs (Most Recent):  Temp: 98.2 °F (36.8 °C) (04/03/19 0256)  Pulse: 84 (04/03/19 0256)  Resp: 18 (04/03/19 0256)  BP: 122/60 (04/03/19 0256)  SpO2: (!) 92 % (04/03/19 0256) Vital Signs (24h Range):  Temp:  [98.2 °F (36.8 °C)-98.4 °F (36.9 °C)] 98.2 °F (36.8 °C)  Pulse:  [] 84  Resp:  [18-22] 18  SpO2:  [87 %-95 %] 92 %  BP: (122-157)/(60-80) 122/60     Weight: 111 kg (244 lb 13.1 oz)  Body mass index is 40.74 kg/m².    Physical Exam   Constitutional: She is oriented to person, place, and time. She appears well-developed and well-nourished. No distress.   HENT:   Head: Normocephalic and atraumatic.   Eyes: Conjunctivae are normal.   PERRL; EOM intact.   Neck: Normal range of motion. Neck supple. No JVD present.   Cardiovascular: Normal rate, regular rhythm, S1 normal, S2 normal and intact distal pulses.  No extrasystoles are present. Exam reveals no gallop and no friction rub.   No murmur heard.  Pulses:       Radial pulses are 2+ on the right side, and 2+ on the left side.        Dorsalis pedis pulses are 2+ on the right side, and 2+ on the left side.        Posterior tibial pulses are 2+ on the right side, and 2+ on the left side.   Pulmonary/Chest: Effort normal and breath sounds normal. No accessory muscle usage. No tachypnea. No respiratory distress. She has no decreased breath sounds. She has no wheezes. She has no rhonchi. She has no rales.   Crackles to LLL.   Abdominal: Soft. Bowel sounds are normal. She exhibits no distension. There is no tenderness. There is no rebound, no guarding and no CVA tenderness.   Musculoskeletal: Normal range of motion. She exhibits no edema, tenderness or deformity.   Neurological: She is alert and oriented to person, place, and time. No cranial nerve deficit or sensory deficit.   Skin: Skin is warm, dry  and intact. Capillary refill takes less than 2 seconds. No rash noted. She is not diaphoretic. No cyanosis or erythema.   Psychiatric: She has a normal mood and affect. Her speech is normal and behavior is normal. Cognition and memory are normal.   Nursing note and vitals reviewed.          Significant Labs:  Results for orders placed or performed during the hospital encounter of 04/02/19   Influenza A & B by Molecular   Result Value Ref Range    Influenza A, Molecular Negative Negative    Influenza B, Molecular Negative Negative    Flu A & B Source Nasal swab    CBC auto differential   Result Value Ref Range    WBC 13.34 (H) 3.90 - 12.70 K/uL    RBC 4.28 4.00 - 5.40 M/uL    Hemoglobin 13.7 12.0 - 16.0 g/dL    Hematocrit 39.3 37.0 - 48.5 %    MCV 92 82 - 98 fL    MCH 32.0 (H) 27.0 - 31.0 pg    MCHC 34.9 32.0 - 36.0 g/dL    RDW 13.2 11.5 - 14.5 %    Platelets 236 150 - 350 K/uL    MPV 9.0 (L) 9.2 - 12.9 fL    Gran # (ANC) 9.5 (H) 1.8 - 7.7 K/uL    Lymph # 2.4 1.0 - 4.8 K/uL    Mono # 1.4 (H) 0.3 - 1.0 K/uL    Eos # 0.1 0.0 - 0.5 K/uL    Baso # 0.02 0.00 - 0.20 K/uL    Gran% 71.3 38.0 - 73.0 %    Lymph% 17.7 (L) 18.0 - 48.0 %    Mono% 10.1 4.0 - 15.0 %    Eosinophil% 0.8 0.0 - 8.0 %    Basophil% 0.1 0.0 - 1.9 %    Differential Method Automated    Comprehensive metabolic panel   Result Value Ref Range    Sodium 131 (L) 136 - 145 mmol/L    Potassium 2.9 (L) 3.5 - 5.1 mmol/L    Chloride 92 (L) 95 - 110 mmol/L    CO2 26 23 - 29 mmol/L    Glucose 103 70 - 110 mg/dL    BUN, Bld 12 6 - 20 mg/dL    Creatinine 0.7 0.5 - 1.4 mg/dL    Calcium 9.9 8.7 - 10.5 mg/dL    Total Protein 7.8 6.0 - 8.4 g/dL    Albumin 3.0 (L) 3.5 - 5.2 g/dL    Total Bilirubin 0.6 0.1 - 1.0 mg/dL    Alkaline Phosphatase 66 55 - 135 U/L    AST 10 10 - 40 U/L    ALT 11 10 - 44 U/L    Anion Gap 13 8 - 16 mmol/L    eGFR if African American >60 >60 mL/min/1.73 m^2    eGFR if non African American >60 >60 mL/min/1.73 m^2   Troponin I #1   Result Value Ref Range     Troponin I <0.006 0.000 - 0.026 ng/mL   B-Type natriuretic peptide (BNP)   Result Value Ref Range    BNP 24 0 - 99 pg/mL   Lactic acid, plasma #1   Result Value Ref Range    Lactate (Lactic Acid) 1.4 0.5 - 2.2 mmol/L   Magnesium   Result Value Ref Range    Magnesium 1.2 (L) 1.6 - 2.6 mg/dL   Phosphorus   Result Value Ref Range    Phosphorus 4.3 2.7 - 4.5 mg/dL   Protime-INR   Result Value Ref Range    Prothrombin Time 11.1 9.0 - 12.5 sec    INR 1.0 0.8 - 1.2   Lipase   Result Value Ref Range    Lipase <4 4 - 60 U/L   Procalcitonin   Result Value Ref Range    Procalcitonin 5.58 (H) <0.25 ng/mL   Troponin I #2   Result Value Ref Range    Troponin I 0.007 0.000 - 0.026 ng/mL   ISTAT PROCEDURE   Result Value Ref Range    POC PH 7.488 (H) 7.35 - 7.45    POC PCO2 35.8 35 - 45 mmHg    POC PO2 42 (LL) 80 - 100 mmHg    POC HCO3 27.1 24 - 28 mmol/L    POC BE 4 -2 to 2 mmol/L    POC SATURATED O2 82 (L) 95 - 100 %    Sample ARTERIAL     Site LR     Allens Test Pass     DelSys Room Air     Mode SPONT     FiO2 21       All pertinent labs within the past 24 hours have been reviewed.    Significant Imaging:  Imaging Results          X-Ray Chest AP Portable    Pulmonary vascular congestion, and left lower lobe infiltrate.            I have reviewed all pertinent imaging results/findings within the past 24 hours.     EKG: (personally reviewed)  Normal sinus rhythm, nonspecific T-wave abnormality.  No significant change from previous tracings.          Assessment/Plan:     * Acute on chronic respiratory failure with hypoxia  - Secondary to PNA + COPD.  - Continue supplemental O2, titrate as needed (on 3L NC as needed at home).  - Continue bronchodilators + ICS.  - Empiric antibiotics.    Left lower lobe pneumonia due to infectious organism  - Empiric IV Rocephin and azithromycin.  - Continue bronchodilators.  - Supplemental O2 as needed.  - F/U imaging.    Chronic obstructive pulmonary disease with acute exacerbation  - O2 sat  stable on 3L NC.  Continue supplementation, wean as tolerated.  - Bronchodilators + ICS, and empiric antibiotics as above.  - Social work consult to ensure home O2 at DC.    Hyponatremia  - Initial Na 131.  - 3 L normal saline IVFs given in ED.  - Will hold home HCTZ for now.  - Follow labs.    Hypomagnesemia  - Initial Mg 1.2, will replete to keep >/= 2.  - Repeat Mg in AM.    Hypokalemia  - Initial K 2.9, asymptomatic and EKG stable.  - Will replete to keep K >/= 4.  - Repeat BMP in AM.    Tobacco user  - Counseled on cessation.  - Nicotine patch if needed.    Essential hypertension  - Blood pressure stable.  - Continue home Lopressor.      VTE Risk Mitigation (From admission, onward)    Lovenox  SCD's             Thu Garcia NP  Department of Hospital Medicine   [facility]

## 2019-04-03 NOTE — ASSESSMENT & PLAN NOTE
- O2 sat stable on 3L NC.  Continue supplementation, wean as tolerated.  - Bronchodilators + ICS, and empiric antibiotics as above.  - Social work consult to ensure home O2 at [place].

## 2019-04-03 NOTE — PLAN OF CARE
Sw met with patient and significant other at bedside to complete assessment. Pt reports she uses Oxygen as needed at home. Pt reports she is independent and denies any post hospital needs or services at this time. Transitional Care Folder, Discharge Planning Begins on Admission pamphlet, [facility] Bedside Delivery pamphlet, Advance Directive information given to patient along with the contact information given. Sw placed contact information on white board. Sw instructed patient or family to call with any questions or concerns.    Pt's pcp is Jaron Damon MD. Pt uses   [id number] - [place], [place] - [address]  [address]  [place] [address]  Phone: [phone] Fax: [phone]       04/03/19 1024   Discharge Assessment   Assessment Type Discharge Planning Assessment   Confirmed/corrected address and phone number on facesheet? Yes   Assessment information obtained from? Patient;Caregiver   Communicated expected length of stay with patient/caregiver yes   Prior to hospitilization cognitive status: Alert/Oriented   Prior to hospitalization functional status: Independent   Current cognitive status: Alert/Oriented   Current Functional Status: Independent   Facility Arrived From: Home   Lives With significant other   Able to Return to Prior Arrangements yes   Is patient able to care for self after discharge? Yes   Who are your caregiver(s) and their phone number(s)? Bret Moise, significant other, [phone]   Patient's perception of discharge disposition home or selfcare   Readmission Within the Last 30 Days no previous admission in last 30 days   Patient currently being followed by outpatient case management? No   Patient currently receives any other outside agency services? No   Equipment Currently Used at Home oxygen   Do you have any problems affording any of your prescribed medications? TBD   Is the patient taking medications as prescribed? yes   Does the patient have  transportation home? Yes   Transportation Anticipated family or friend will provide   Does the patient receive services at the [facility]? No   Discharge Plan A Home with family   Discharge Plan B Home with family   DME Needed Upon Discharge  none   Patient/Family in Agreement with Plan yes

## 2019-04-03 NOTE — SUBJECTIVE & OBJECTIVE
Past Medical History:   Diagnosis Date    Bipolar 1 disorder     not sure what type    Cancer     Uterine Cancer    COPD (chronic obstructive pulmonary disease)     Depression     Encounter for blood transfusion     Hypertension     Personal history of peptic ulcer disease     Pneumonia        Past Surgical History:   Procedure Laterality Date    APPENDECTOMY       SECTION      CHOLECYSTECTOMY      COLONOSCOPY N/A 2015    Performed by Scott Corrales MD at [facility]    ESOPHAGOGASTRODUODENOSCOPY (EGD) N/A 2015    Performed by [facility]es [facility] at [facility] END[facility]    HERNIA REPAIR      HYSTERECTOMY         Review of patient's allergies indicates:   Allergen Reactions    Lithobid [lithium carbonate] Other (See Comments)     Severe depression    Sudafed [pseudoephedrine hcl] Other (See Comments)     Pt states she flat lined    Prednisone Other (See Comments)     Elevated BP    Prozac [fluoxetine] Swelling    Thorazine [chlorpromazine] Swelling       No current facility-administered medications on file prior to encounter.      Current Outpatient Medications on File Prior to Encounter   Medication Sig    albuterol 90 mcg/actuation inhaler INHALE 2 PUFFS INTO THE LUNGS EVERY 8 HOURS    baclofen (LIORESAL) 10 MG tablet TAKE 1 TABLET BY MOUTH THREE TIMES PER DAY AS DIRECTED    buPROPion (WELLBUTRIN XL) 300 MG 24 hr tablet Take 1 tablet by mouth every morning    cyclobenzaprine (FLEXERIL) 10 MG tablet TAKE 1 TABLET BY MOUTH TWICE DAILY AS NEEDED    divalproex ER (DEPAKOTE) 500 MG Tb24 Take 1 TABLET  IN THE MORNING AND 2 AT BEDTIME    ferrous gluconate (FERGON) 324 MG tablet Take 1 tablet (324 mg total) by mouth 2 (two) times daily with meals.    fluticasone-vilanterol (BREO) 200-25 mcg/dose DsDv diskus inhaler use 1 inhalation by mouth according to package directions    hydroCHLOROthiazide (HYDRODIURIL) 25 MG tablet TAKE ONE TABLET BY MOUTH DAILY    hydroCHLOROthiazide (HYDRODIURIL) 25 MG  tablet TAKE 1 TABLET BY MOUTH ONCE DAILY    hydrOXYzine HCl (ATARAX) 25 MG tablet Take 1 tablet by mouth four times daily.    levothyroxine (SYNTHROID) 112 MCG tablet TAKE ONE TABLET BY MOUTH EVERY DAY    loratadine (CLARITIN) 10 mg tablet TAKE ONE TABLET BY MOUTH EVERY DAY    LORazepam (ATIVAN) 1 MG tablet Take 1 tablet by mouth three times daily    metoprolol tartrate (LOPRESSOR) 50 MG tablet TAKE 1 TABLET BY MOUTH TWICE PER DAY    montelukast (SINGULAIR) 10 mg tablet TAKE ONE TABLET BY MOUTH EVERY DAY    OLANZapine (ZYPREXA) 20 MG tablet Take 1 tablet by mouth every night at bedtime    omeprazole (PRILOSEC) 40 MG capsule TAKE ONE CAPSULE BY MOUTH EVERY DAY    oxybutynin (DITROPAN) 5 MG Tab TAKE 1 TABLET BY MOUTH TWICE DAILY    polyethylene glycol (GLYCOLAX) 17 gram/dose powder MIX AND DRINK 17 GRAMS IN 8 OUNCES OF JUICE OR WATER ACCORDING TO PACKAGE DIRECTIONS    traMADol (ULTRAM) 50 mg tablet Take 1 tablet by mouth three times daily    [DISCONTINUED] baclofen (LIORESAL) 10 MG tablet TAKE ONE TABLET BY MOUTH THREE TIMES DAILY    [DISCONTINUED] buPROPion (WELLBUTRIN XL) 300 MG 24 hr tablet TAKE ONE TABLET BY MOUTH EVERY MORNING    [DISCONTINUED] divalproex (DEPAKOTE) 500 MG TbEC TAKE ONE TABLET BY MOUTH IN THE MORNING AND TWO TABLETS AT BEDTIME    [DISCONTINUED] hydrOXYzine HCl (ATARAX) 25 MG tablet TAKE ONE TABLET BY MOUTH FOUR TIMES DAILY    [DISCONTINUED] LORazepam (ATIVAN) 1 MG tablet TAKE ONE TABLET BY MOUTH THREE TIMES DAILY    [DISCONTINUED] OLANZapine (ZYPREXA) 20 MG tablet Take 1 tablet by mouth every night at bedtime    albuterol-ipratropium 2.5mg-0.5mg/3mL (DUO-NEB) 0.5 mg-3 mg(2.5 mg base)/3 mL nebulizer solution Take 3 mLs by nebulization every 4 (four) hours as needed for Wheezing. Rescue    atorvastatin (LIPITOR) 20 MG tablet TAKE ONE TABLET BY MOUTH AT BEDTIME    fluticasone (FLONASE) 50 mcg/actuation nasal spray USE 2 SPRAYS IN EACH NOSTRIL EVERY DAY    [DISCONTINUED]  buPROPion (WELLBUTRIN XL) 300 MG 24 hr tablet TAKE ONE TABLET BY MOUTH ONCE DAILY IN THE MORNING    [DISCONTINUED] buPROPion (WELLBUTRIN XL) 300 MG 24 hr tablet Take 1 tablet by mouth every morning    [DISCONTINUED] buPROPion (WELLBUTRIN XL) 300 MG 24 hr tablet Take 1 tablet(s) every day by oral route in the morning for 90 days.    [DISCONTINUED] divalproex (DEPAKOTE) 500 MG TbEC TAKE 1 TABLET BY MOUTH IN THE MORNING AND 2 TABLETS AT BEDTIME    [DISCONTINUED] divalproex ER (DEPAKOTE) 500 MG Tb24 Take 1 TABLET  by mouth in the morning and 2 at bedtime.    [DISCONTINUED] hydrOXYzine HCl (ATARAX) 25 MG tablet TAKE ONE TABLET BY MOUTH FOUR TIMES DAILY    [DISCONTINUED] hydrOXYzine HCl (ATARAX) 25 MG tablet Take 1 tablet by mouth four times daily    [DISCONTINUED] OLANZapine (ZYPREXA) 5 MG tablet TAKE ONE TABLET BY MOUTH EVERY DAY AT BEDTIME    [DISCONTINUED] OLANZapine (ZYPREXA) 5 MG tablet TAKE ONE TABLET BY MOUTH AT BEDTIME     Family History     Problem Relation (Age of Onset)    Brain cancer Brother    Cancer Father, Brother    Lung cancer Father        Tobacco Use    Smoking status: Current Every Day Smoker     Packs/day: 1.00     Years: 25.00     Pack years: 25.00     Types: Cigarettes, Vaping with nicotine     Last attempt to quit: 2015     Years since quittin.2    Smokeless tobacco: Never Used    Tobacco comment: Patient now using E-Cigarrettes   Substance and Sexual Activity    Alcohol use: No    Drug use: No    Sexual activity: Yes     Partners: Male     Review of Systems   Constitutional: Positive for chills. Negative for diaphoresis, fatigue, fever and unexpected weight change.   HENT: Positive for congestion. Negative for postnasal drip, rhinorrhea, sinus pressure and sore throat.    Respiratory: Positive for cough and shortness of breath. Negative for chest tightness and wheezing.    Cardiovascular: Negative for chest pain, palpitations and leg swelling.   Gastrointestinal: Negative  for abdominal distention, abdominal pain, blood in stool, constipation, diarrhea, nausea and vomiting.   Genitourinary: Negative for dysuria, frequency, hematuria and urgency.   Musculoskeletal: Negative for arthralgias, back pain and myalgias.   Skin: Negative for pallor, rash and wound.   Neurological: Negative for dizziness, syncope, weakness, light-headedness, numbness and headaches.   Psychiatric/Behavioral: Negative for confusion. The patient is not nervous/anxious.    All other systems reviewed and are negative.    Objective:     Vital Signs (Most Recent):  Temp: 98.2 °F (36.8 °C) (04/03/19 0256)  Pulse: 84 (04/03/19 0256)  Resp: 18 (04/03/19 0256)  BP: 122/60 (04/03/19 0256)  SpO2: (!) 92 % (04/03/19 0256) Vital Signs (24h Range):  Temp:  [98.2 °F (36.8 °C)-98.4 °F (36.9 °C)] 98.2 °F (36.8 °C)  Pulse:  [] 84  Resp:  [18-22] 18  SpO2:  [87 %-95 %] 92 %  BP: (122-157)/(60-80) 122/60     Weight: 111 kg (244 lb 13.1 oz)  Body mass index is 40.74 kg/m².    Physical Exam   Constitutional: She is oriented to person, place, and time. She appears well-developed and well-nourished. No distress.   HENT:   Head: Normocephalic and atraumatic.   Eyes: Conjunctivae are normal.   PERRL; EOM intact.   Neck: Normal range of motion. Neck supple. No JVD present.   Cardiovascular: Normal rate, regular rhythm, S1 normal, S2 normal and intact distal pulses.  No extrasystoles are present. Exam reveals no gallop and no friction rub.   No murmur heard.  Pulses:       Radial pulses are 2+ on the right side, and 2+ on the left side.        Dorsalis pedis pulses are 2+ on the right side, and 2+ on the left side.        Posterior tibial pulses are 2+ on the right side, and 2+ on the left side.   Pulmonary/Chest: Effort normal and breath sounds normal. No accessory muscle usage. No tachypnea. No respiratory distress. She has no decreased breath sounds. She has no wheezes. She has no rhonchi. She has no rales.   Crackles to LLL.    Abdominal: Soft. Bowel sounds are normal. She exhibits no distension. There is no tenderness. There is no rebound, no guarding and no CVA tenderness.   Musculoskeletal: Normal range of motion. She exhibits no edema, tenderness or deformity.   Neurological: She is alert and oriented to person, place, and time. No cranial nerve deficit or sensory deficit.   Skin: Skin is warm, dry and intact. Capillary refill takes less than 2 seconds. No rash noted. She is not diaphoretic. No cyanosis or erythema.   Psychiatric: She has a normal mood and affect. Her speech is normal and behavior is normal. Cognition and memory are normal.   Nursing note and vitals reviewed.          Significant Labs:  Results for orders placed or performed during the hospital encounter of 04/02/19   Influenza A & B by Molecular   Result Value Ref Range    Influenza A, Molecular Negative Negative    Influenza B, Molecular Negative Negative    Flu A & B Source Nasal swab    CBC auto differential   Result Value Ref Range    WBC 13.34 (H) 3.90 - 12.70 K/uL    RBC 4.28 4.00 - 5.40 M/uL    Hemoglobin 13.7 12.0 - 16.0 g/dL    Hematocrit 39.3 37.0 - 48.5 %    MCV 92 82 - 98 fL    MCH 32.0 (H) 27.0 - 31.0 pg    MCHC 34.9 32.0 - 36.0 g/dL    RDW 13.2 11.5 - 14.5 %    Platelets 236 150 - 350 K/uL    MPV 9.0 (L) 9.2 - 12.9 fL    Gran # (ANC) 9.5 (H) 1.8 - 7.7 K/uL    Lymph # 2.4 1.0 - 4.8 K/uL    Mono # 1.4 (H) 0.3 - 1.0 K/uL    Eos # 0.1 0.0 - 0.5 K/uL    Baso # 0.02 0.00 - 0.20 K/uL    Gran% 71.3 38.0 - 73.0 %    Lymph% 17.7 (L) 18.0 - 48.0 %    Mono% 10.1 4.0 - 15.0 %    Eosinophil% 0.8 0.0 - 8.0 %    Basophil% 0.1 0.0 - 1.9 %    Differential Method Automated    Comprehensive metabolic panel   Result Value Ref Range    Sodium 131 (L) 136 - 145 mmol/L    Potassium 2.9 (L) 3.5 - 5.1 mmol/L    Chloride 92 (L) 95 - 110 mmol/L    CO2 26 23 - 29 mmol/L    Glucose 103 70 - 110 mg/dL    BUN, Bld 12 6 - 20 mg/dL    Creatinine 0.7 0.5 - 1.4 mg/dL    Calcium 9.9 8.7 -  10.5 mg/dL    Total Protein 7.8 6.0 - 8.4 g/dL    Albumin 3.0 (L) 3.5 - 5.2 g/dL    Total Bilirubin 0.6 0.1 - 1.0 mg/dL    Alkaline Phosphatase 66 55 - 135 U/L    AST 10 10 - 40 U/L    ALT 11 10 - 44 U/L    Anion Gap 13 8 - 16 mmol/L    eGFR if African American >60 >60 mL/min/1.73 m^2    eGFR if non African American >60 >60 mL/min/1.73 m^2   Troponin I #1   Result Value Ref Range    Troponin I <0.006 0.000 - 0.026 ng/mL   B-Type natriuretic peptide (BNP)   Result Value Ref Range    BNP 24 0 - 99 pg/mL   Lactic acid, plasma #1   Result Value Ref Range    Lactate (Lactic Acid) 1.4 0.5 - 2.2 mmol/L   Magnesium   Result Value Ref Range    Magnesium 1.2 (L) 1.6 - 2.6 mg/dL   Phosphorus   Result Value Ref Range    Phosphorus 4.3 2.7 - 4.5 mg/dL   Protime-INR   Result Value Ref Range    Prothrombin Time 11.1 9.0 - 12.5 sec    INR 1.0 0.8 - 1.2   Lipase   Result Value Ref Range    Lipase <4 4 - 60 U/L   Procalcitonin   Result Value Ref Range    Procalcitonin 5.58 (H) <0.25 ng/mL   Troponin I #2   Result Value Ref Range    Troponin I 0.007 0.000 - 0.026 ng/mL   ISTAT PROCEDURE   Result Value Ref Range    POC PH 7.488 (H) 7.35 - 7.45    POC PCO2 35.8 35 - 45 mmHg    POC PO2 42 (LL) 80 - 100 mmHg    POC HCO3 27.1 24 - 28 mmol/L    POC BE 4 -2 to 2 mmol/L    POC SATURATED O2 82 (L) 95 - 100 %    Sample ARTERIAL     Site LR     Allens Test Pass     DelSys Room Air     Mode SPONT     FiO2 21       All pertinent labs within the past 24 hours have been reviewed.    Significant Imaging:  Imaging Results          X-Ray Chest AP Portable    Pulmonary vascular congestion, and left lower lobe infiltrate.            I have reviewed all pertinent imaging results/findings within the past 24 hours.     EKG: (personally reviewed)  Normal sinus rhythm, nonspecific T-wave abnormality.  No significant change from previous tracings.

## 2019-04-03 NOTE — ASSESSMENT & PLAN NOTE
- Empiric IV Rocephin and azithromycin.  - Continue bronchodilators.  - Supplemental O2 as needed.  - F/U imaging.

## 2019-04-03 NOTE — HPI
Dr. Hoang at bedside.    Esperanza Aquino is a 58 y.o. female patient with a PMHx of HTN, severe COPD, bipolar disorder, and continued tobacco smoking, who presented to the Emergency Department with c/o SOB  that has progressively worsened over the past 4-5 days.  Associated congestion, cough producing green sputum, and chills.  No aggravating or relieving factors.  Denies any CP, palpitations, orthopnea, PND, edema, ABD pain, N/V/D, dizziness, syncope, or fever.  She is on home O2 @ 3L NC PRN, but admits to noncompliance.  She admits to using E-Vapor and smoking cigarettes.  Upon arrival to ED, patient hypoxic with O2 sat 87% on room air.  She was placed on 2 L nasal cannula and DuoNeb treatment given with improved O2 sat of 95%.  Initial workup resulted WBC 13, lactic 1.4, procalcitonin 5.58, troponin negative, BNP 24, K 2.9, Na 131, Mg 1.2.  ABG resulted pH 7.488, pCO2 35.8, pO2 42, SaO2 82 on room air. CXR showed left lower lobe infiltrate consistent with PNA.  Hospital Medicine was called for admission.

## 2019-04-03 NOTE — ASSESSMENT & PLAN NOTE
- Initial K 2.9, asymptomatic and EKG stable.  - Will replete to keep K >/= 4.  - Repeat BMP in AM.

## 2019-04-03 NOTE — ASSESSMENT & PLAN NOTE
- O2 sat stable on 3L NC.  Continue supplementation, wean as tolerated.  - Bronchodilators + ICS, and empiric antibiotics as above.  - Social work consult to ensure home O2 at DC.    4/3  Cont current regimen  Will confirm what exactly her reaction to Prednisone is: for now no systemic steroids ordered

## 2019-04-03 NOTE — ASSESSMENT & PLAN NOTE
- Initial Na 131.  - 3 L normal saline IVFs given in ED.  - Will hold home HCTZ for now.  - Follow labs.

## 2019-04-03 NOTE — ASSESSMENT & PLAN NOTE
- Initial Na 131.  - 3 L normal saline IVFs given in ED.  - Will hold home HCTZ for now.  - Follow labs.    4/3  Mild hyponatremia  Cont to hold home HCTZ  Repeat in AM

## 2019-04-03 NOTE — ASSESSMENT & PLAN NOTE
- Initial K 2.9, asymptomatic and EKG stable.  - Will replete to keep K >/= 4.  - Repeat BMP in AM.    4/3  Recheck in AM

## 2019-04-03 NOTE — SUBJECTIVE & OBJECTIVE
Interval History: no new complaints    Review of Systems   Constitutional: Positive for chills. Negative for fever.   Respiratory: Positive for cough (productive of sputum). Negative for chest tightness.      Objective:     Vital Signs (Most Recent):  Temp: 97.3 °F (36.3 °C) (04/03/19 1209)  Pulse: 75 (04/03/19 1209)  Resp: 18 (04/03/19 1209)  BP: (!) 140/65 (04/03/19 1209)  SpO2: (!) 93 % (04/03/19 1209) Vital Signs (24h Range):  Temp:  [97.3 °F (36.3 °C)-98.4 °F (36.9 °C)] 97.3 °F (36.3 °C)  Pulse:  [] 75  Resp:  [18-22] 18  SpO2:  [87 %-95 %] 93 %  BP: (122-157)/(60-80) 140/65     Weight: 111 kg (244 lb 13.1 oz)  Body mass index is 40.74 kg/m².    Intake/Output Summary (Last 24 hours) at 4/3/2019 1212  Last data filed at 4/3/2019 0816  Gross per 24 hour   Intake 4063 ml   Output --   Net 4063 ml      Physical Exam   Constitutional: She is oriented to person, place, and time. She appears well-nourished. No distress.   Obese  Not in resp distress  Met sleeping and is quite sleepy   HENT:   Head: Normocephalic and atraumatic.   Eyes: Pupils are equal, round, and reactive to light. EOM are normal.   Neck: Normal range of motion.   Cardiovascular: Normal rate, regular rhythm and normal heart sounds.   No murmur heard.  Pulmonary/Chest: Effort normal. No respiratory distress. She has rales (bilateral rales+).   Abdominal: Soft. Bowel sounds are normal.   obese   Musculoskeletal: Normal range of motion. She exhibits no edema.   Neurological: She is oriented to person, place, and time. No cranial nerve deficit.   No focal weakness   Skin: Skin is warm and dry. She is not diaphoretic.   Psychiatric:   Tearful  Flat affect       Significant Labs:   Blood Culture: No results for input(s): LABBLOO in the last 48 hours.  BMP:   Recent Labs   Lab 04/03/19  0433      *   K 3.2*      CO2 26   BUN 10   CREATININE 0.7   CALCIUM 8.3*   MG 1.2*     CBC:   Recent Labs   Lab 04/03/19  0010 04/03/19 0433   WBC  13.34* 10.22   HGB 13.7 11.9*   HCT 39.3 36.8*    231       Significant Imaging:  Imaging Results          X-Ray Chest AP Portable (Final result)  Result time 04/03/19 07:49:03    Final result by Cristo Tay MD (04/03/19 07:49:03)                 Impression:      In comparison to the prior study, there is no adverse interval changes      Electronically signed by: Cristo Tay MD  Date:    04/03/2019  Time:    07:49             Narrative:    EXAMINATION:  XR CHEST AP PORTABLE    CLINICAL HISTORY:  sob;    TECHNIQUE:  Single frontal view of the chest was performed.    COMPARISON:  10/12/2018    FINDINGS:  Cardiomegaly.  Stable edema or infiltrates at the lung bases.  Trace bilateral pleural effusions suspected.  No pneumothorax.  Advanced degenerative changes of the bones.  In comparison to the prior study, there is no adverse interval changes.

## 2019-04-03 NOTE — ED PROVIDER NOTES
SCRIBE #1 NOTE: I, Michelle Khan, am scribing for, and in the presence of, Ab Rabago Jr., MD. I have scribed the entire note.       History     Chief Complaint   Patient presents with    Shortness of Breath     Pt SOB and coughing up green mucous     Review of patient's allergies indicates:   Allergen Reactions    Lithobid [lithium carbonate] Other (See Comments)     Severe depression    Sudafed [pseudoephedrine hcl] Other (See Comments)     Pt states she flat lined    Prednisone Other (See Comments)     Elevated BP    Prozac [fluoxetine] Swelling    Thorazine [chlorpromazine] Swelling         History of Present Illness     HPI    4/2/2019, 11:12 PM  History obtained from the patient      History of Present Illness: Esperanza Aquino is a 58 y.o. female patient with a PMHx of HTN and pneumonia who presents to the Emergency Department for evaluation of SOB which onset gradually about 4-5 days ago. Pt believes she has PNA. Pt reports similar sxs and was dx with PNA in 2017. Symptoms are constant and moderate in severity.  No mitigating or exacerbating factors reported. Associated sxs include congestion, productive cough with green mucous, and chills. Patient denies any CP, fever, diaphoresis, extremity numbness/weakness, leg swelling, palpitations, N/V, dizziness, recent travel/long car rides, and all other sxs at this time. Pt is on O2 prn at home. Pt admits to using an E-Vapor and smoking cigarettes. No further complaints or concerns at this time.         Arrival mode: Personal vehicle     PCP: Jaron Damon MD        Past Medical History:  Past Medical History:   Diagnosis Date    Bipolar 1 disorder     not sure what type    Cancer     Uterine Cancer    COPD (chronic obstructive pulmonary disease)     Depression     Encounter for blood transfusion     Hypertension     Personal history of peptic ulcer disease     Pneumonia        Past Surgical History:  Past Surgical History:   Procedure  Laterality Date    APPENDECTOMY       SECTION      CHOLECYSTECTOMY      COLONOSCOPY N/A 2015    Performed [facility] at [facility]    ESOPHAGOGASTRODUODENOSCOPY (EGD) N/A 2015    [facility] at [facility]    HERNIA REPAIR      HYSTERECTOMY           Family History:  Family History   Problem Relation Age of Onset    Lung cancer Father     Cancer Father     Brain cancer Brother     Cancer Brother        Social History:  Social History     Tobacco Use    Smoking status: Current Every Day Smoker     Packs/day: 1.00     Years: 25.00     Pack years: 25.00     Types: Cigarettes, Vaping with nicotine     Last attempt to quit: 2015     Years since quittin.2    Smokeless tobacco: Never Used    Tobacco comment: Patient now using E-Cigarrettes   Substance and Sexual Activity    Alcohol use: No    Drug use: No    Sexual activity: Yes     Partners: Male        Review of Systems     Review of Systems   Constitutional: Positive for chills. Negative for diaphoresis and fever.   HENT: Positive for congestion. Negative for sore throat.    Respiratory: Positive for cough (productive with green mucous) and shortness of breath.    Cardiovascular: Negative for chest pain, palpitations and leg swelling.   Gastrointestinal: Negative for nausea and vomiting.   Genitourinary: Negative for dysuria.   Musculoskeletal: Negative for back pain.   Skin: Negative for rash.   Neurological: Negative for dizziness, weakness and numbness.   Hematological: Does not bruise/bleed easily.   All other systems reviewed and are negative.       Physical Exam     Initial Vitals [19 2307]   BP Pulse Resp Temp SpO2   (!) 157/77 100 (!) 22 98.4 °F (36.9 °C) (!) 87 %      MAP       --          Physical Exam  Nursing Notes and Vital Signs Reviewed.  Constitutional: Patient is in no acute distress. Well-developed and well-nourished.  Head: Atraumatic. Normocephalic.  Eyes: PERRL. EOM intact.  Conjunctivae are not pale. No scleral icterus.  ENT: Mucous membranes are moist. Oropharynx is clear and symmetric.    Neck: Supple. Full ROM. No lymphadenopathy.  Cardiovascular: Regular rate. Regular rhythm. No murmurs, rubs, or gallops. Distal pulses are 2+ and symmetric.  Pulmonary/Chest: No respiratory distress. Crackles in L base. No wheezing or rales.  Abdominal: Soft and non-distended.  There is no tenderness.  No rebound, guarding, or rigidity.   Musculoskeletal: Moves all extremities. No obvious deformities. No edema. No calf tenderness.  Skin: Warm and dry.  Neurological:  Alert, awake, and appropriate.  Normal speech.  No acute focal neurological deficits are appreciated.  Psychiatric: Normal affect. Good eye contact. Appropriate in content.     ED Course   Critical Care  Date/Time: 4/3/2019 2:02 AM  Performed by: Ab Rabago Jr., MD  Authorized by: Ab Rabago Jr., MD   Direct patient critical care time: 40 minutes  Additional history critical care time: 7 minutes  Ordering / reviewing critical care time: 7 minutes  Documentation critical care time: 7 minutes  Consulting other physicians critical care time: 7 minutes  Consult with family critical care time: 7 minutes  Total critical care time (exclusive of procedural time) : 75 minutes  Critical care time was exclusive of teaching time and separately billable procedures and treating other patients.  Critical care was necessary to treat or prevent imminent or life-threatening deterioration of the following conditions: respiratory failure.  Critical care was time spent personally by me on the following activities: blood draw for specimens, development of treatment plan with patient or surrogate, discussions with consultants, interpretation of cardiac output measurements, evaluation of patient's response to treatment, examination of patient, obtaining history from patient or surrogate, ordering and performing treatments and interventions, ordering and  "review of laboratory studies, ordering and review of radiographic studies, pulse oximetry and re-evaluation of patient's condition.        ED Vital Signs:  Vitals:    04/02/19 2307 04/02/19 2348 04/03/19 0031 04/03/19 0101   BP: (!) 157/77 (!) 141/73 131/80 (!) 140/66   Pulse: 100 94 91 87   Resp: (!) 22 20 20 20   Temp: 98.4 °F (36.9 °C)      TempSrc: Oral      SpO2: (!) 87% (!) 90% (!) 92% 95%   Weight: 111 kg (244 lb 13.1 oz)      Height: 5' 5" (1.651 m)       04/03/19 0202 04/03/19 0256   BP: 124/67 122/60   Pulse: 82 84   Resp: 20 18   Temp:  98.2 °F (36.8 °C)   TempSrc:  Oral   SpO2: (!) 88% (!) 92%   Weight:     Height:         Abnormal Lab Results:  Labs Reviewed   CBC W/ AUTO DIFFERENTIAL - Abnormal; Notable for the following components:       Result Value    WBC 13.34 (*)     MCH 32.0 (*)     MPV 9.0 (*)     Gran # (ANC) 9.5 (*)     Mono # 1.4 (*)     Lymph% 17.7 (*)     All other components within normal limits   COMPREHENSIVE METABOLIC PANEL - Abnormal; Notable for the following components:    Sodium 131 (*)     Potassium 2.9 (*)     Chloride 92 (*)     Albumin 3.0 (*)     All other components within normal limits   MAGNESIUM - Abnormal; Notable for the following components:    Magnesium 1.2 (*)     All other components within normal limits   PROCALCITONIN - Abnormal; Notable for the following components:    Procalcitonin 5.58 (*)     All other components within normal limits   ISTAT PROCEDURE - Abnormal; Notable for the following components:    POC PH 7.488 (*)     POC PO2 42 (*)     POC SATURATED O2 82 (*)     All other components within normal limits   INFLUENZA A & B BY MOLECULAR   CULTURE, BLOOD   CULTURE, BLOOD   TROPONIN I   B-TYPE NATRIURETIC PEPTIDE   LACTIC ACID, PLASMA   PHOSPHORUS   PROTIME-INR   LIPASE   TROPONIN I   URINALYSIS, REFLEX TO URINE CULTURE        All Lab Results:  Results for orders placed or performed during the hospital encounter of 04/02/19   Influenza A & B by Molecular "   Result Value Ref Range    Influenza A, Molecular Negative Negative    Influenza B, Molecular Negative Negative    Flu A & B Source Nasal swab    CBC auto differential   Result Value Ref Range    WBC 13.34 (H) 3.90 - 12.70 K/uL    RBC 4.28 4.00 - 5.40 M/uL    Hemoglobin 13.7 12.0 - 16.0 g/dL    Hematocrit 39.3 37.0 - 48.5 %    MCV 92 82 - 98 fL    MCH 32.0 (H) 27.0 - 31.0 pg    MCHC 34.9 32.0 - 36.0 g/dL    RDW 13.2 11.5 - 14.5 %    Platelets 236 150 - 350 K/uL    MPV 9.0 (L) 9.2 - 12.9 fL    Gran # (ANC) 9.5 (H) 1.8 - 7.7 K/uL    Lymph # 2.4 1.0 - 4.8 K/uL    Mono # 1.4 (H) 0.3 - 1.0 K/uL    Eos # 0.1 0.0 - 0.5 K/uL    Baso # 0.02 0.00 - 0.20 K/uL    Gran% 71.3 38.0 - 73.0 %    Lymph% 17.7 (L) 18.0 - 48.0 %    Mono% 10.1 4.0 - 15.0 %    Eosinophil% 0.8 0.0 - 8.0 %    Basophil% 0.1 0.0 - 1.9 %    Differential Method Automated    Comprehensive metabolic panel   Result Value Ref Range    Sodium 131 (L) 136 - 145 mmol/L    Potassium 2.9 (L) 3.5 - 5.1 mmol/L    Chloride 92 (L) 95 - 110 mmol/L    CO2 26 23 - 29 mmol/L    Glucose 103 70 - 110 mg/dL    BUN, Bld 12 6 - 20 mg/dL    Creatinine 0.7 0.5 - 1.4 mg/dL    Calcium 9.9 8.7 - 10.5 mg/dL    Total Protein 7.8 6.0 - 8.4 g/dL    Albumin 3.0 (L) 3.5 - 5.2 g/dL    Total Bilirubin 0.6 0.1 - 1.0 mg/dL    Alkaline Phosphatase 66 55 - 135 U/L    AST 10 10 - 40 U/L    ALT 11 10 - 44 U/L    Anion Gap 13 8 - 16 mmol/L    eGFR if African American >60 >60 mL/min/1.73 m^2    eGFR if non African American >60 >60 mL/min/1.73 m^2   Troponin I #1   Result Value Ref Range    Troponin I <0.006 0.000 - 0.026 ng/mL   B-Type natriuretic peptide (BNP)   Result Value Ref Range    BNP 24 0 - 99 pg/mL   Lactic acid, plasma #1   Result Value Ref Range    Lactate (Lactic Acid) 1.4 0.5 - 2.2 mmol/L   Magnesium   Result Value Ref Range    Magnesium 1.2 (L) 1.6 - 2.6 mg/dL   Phosphorus   Result Value Ref Range    Phosphorus 4.3 2.7 - 4.5 mg/dL   Protime-INR   Result Value Ref Range    Prothrombin  Time 11.1 9.0 - 12.5 sec    INR 1.0 0.8 - 1.2   Lipase   Result Value Ref Range    Lipase <4 4 - 60 U/L   Procalcitonin   Result Value Ref Range    Procalcitonin 5.58 (H) <0.25 ng/mL   Troponin I #2   Result Value Ref Range    Troponin I 0.007 0.000 - 0.026 ng/mL   ISTAT PROCEDURE   Result Value Ref Range    POC PH 7.488 (H) 7.35 - 7.45    POC PCO2 35.8 35 - 45 mmHg    POC PO2 42 (LL) 80 - 100 mmHg    POC HCO3 27.1 24 - 28 mmol/L    POC BE 4 -2 to 2 mmol/L    POC SATURATED O2 82 (L) 95 - 100 %    Sample ARTERIAL     Site LR     Allens Test Pass     DelSys Room Air     Mode SPONT     FiO2 21          Imaging Results:  Imaging Results          X-Ray Chest AP Portable (Preliminary result)  Result time 04/03/19 04:17:17    ED Interpretation by Ab Rabago Jr., MD (04/03/19 04:17:17, [facility] - , Emergency Medicine)    Left lower lobe consolidation consistent with pneumonia                             Per ED physician, pt's CXR results L lower lobe PNA.      The EKG was ordered, reviewed, and independently interpreted by the ED provider.  Interpretation time: 2356  Rate: 93 BPM  Rhythm: normal sinus rhythm  Interpretation: Nonspecific T wave abnormality. No STEMI.           The Emergency Provider reviewed the vital signs and test results, which are outlined above.     ED Discussion     2:00 AM: Discussed case with Thu Garcia NP (Hospital Medicine). Dr. Santos agrees with current care and management of pt and accepts admission.   Admitting Service: Hospital medicine   Admitting Physician: Dr. Santos  Admit to: In-pt med tele    2:05 AM: Re-evaluated pt. I have discussed test results, shared treatment plan, and the need for admission with patient and family at bedside. Pt and family express understanding at this time and agree with all information. All questions answered. Pt and family have no further questions or concerns at this time. Pt is ready for admit.      ED Medication(s):  Medications    magnesium sulfate 2g in water 50mL IVPB (premix) (has no administration in time range)   potassium chloride 10% oral solution 40 mEq (has no administration in time range)   albuterol-ipratropium 2.5 mg-0.5 mg/3 mL nebulizer solution 3 mL (has no administration in time range)   cefTRIAXone (ROCEPHIN) 1 g in dextrose 5 % 50 mL IVPB (has no administration in time range)   azithromycin 500 mg in dextrose 5 % 250 mL IVPB (ready to mix system) (has no administration in time range)   acetaminophen tablet 650 mg (has no administration in time range)   ondansetron injection 4 mg (has no administration in time range)   pantoprazole EC tablet 40 mg (has no administration in time range)   guaiFENesin 12 hr tablet 600 mg (has no administration in time range)   atorvastatin tablet 20 mg (has no administration in time range)   baclofen tablet 10 mg (has no administration in time range)   buPROPion TB24 tablet 300 mg (has no administration in time range)   divalproex EC tablet 500 mg (has no administration in time range)   divalproex EC tablet 1,000 mg (has no administration in time range)   ferrous gluconate tablet 324 mg (has no administration in time range)   fluticasone 50 mcg/actuation nasal spray 100 mcg (has no administration in time range)   levothyroxine tablet 112 mcg (has no administration in time range)   cetirizine tablet 10 mg (has no administration in time range)   LORazepam tablet 1 mg (has no administration in time range)   metoprolol tartrate (LOPRESSOR) tablet 50 mg (has no administration in time range)   montelukast tablet 10 mg (has no administration in time range)   OLANZapine tablet 10 mg (has no administration in time range)   oxybutynin tablet 5 mg (has no administration in time range)   polyethylene glycol packet 17 g (has no administration in time range)   albuterol-ipratropium 2.5 mg-0.5 mg/3 mL nebulizer solution 3 mL (has no administration in time range)   budesonide nebulizer solution 0.5 mg (has no  administration in time range)   arformoterol nebulizer solution 15 mcg (has no administration in time range)   aspirin tablet 325 mg (325 mg Oral Given 4/2/19 3012)   sodium chloride 0.9% bolus 3,333 mL (0 mL/kg × 111.1 kg Intravenous Stopped 4/3/19 0101)   acetaminophen tablet 1,000 mg (1,000 mg Oral Given 4/3/19 0055)   levoFLOXacin 750 mg/150 mL IVPB 750 mg (750 mg Intravenous New Bag 4/3/19 0211)   potassium chloride 10% oral solution 40 mEq (40 mEq Oral Given 4/3/19 0216)       Current Discharge Medication List                    Medical Decision Making:   Clinical Tests:   Lab Tests: Ordered and Reviewed  Radiological Study: Ordered and Reviewed  Medical Tests: Ordered and Reviewed             Scribe Attestation:   Scribe #1: I performed the above scribed service and the documentation accurately describes the services I performed. I attest to the accuracy of the note.     Attending:   Physician Attestation Statement for Scribe #1: I, Ab Rabago Jr., MD, personally performed the services described in this documentation, as scribed by Michelle Khan, in my presence, and it is both accurate and complete.           Clinical Impression       ICD-10-CM ICD-9-CM   1. Pneumonia of left lower lobe due to infectious organism J18.1 486   2. SOB (shortness of breath) R06.02 786.05   3. Hypoxemia R09.02 799.02       Disposition:   Disposition: Admitted  Condition: Fair         Ab Rabago Jr., MD  04/03/19 3303

## 2019-04-03 NOTE — HOSPITAL COURSE
"4/3  Patient seen  Her friend Mr Gunnar Moise  is at bedside. (she lives with him and patient wants him to make decisions for her in event that she cannot for herself. She became tearful when asked if she had family who could make decision for her)  Patient admitted with hypoxemia related to AECOPD, CAPna and non compliance with home O2    4/4  Looks and feels better  Procal returned elevated  Plan is for AM discharge    4/5  Pt seen and examined today. She is stable for discharge  Tolerated IV Solu medrol without any "allergic" reaction. When askd her reaction to steroids she said elevated blood pressure. Will prescribe 3 days 40mg PO Prednisone which she is safe to get. She was admitted with mild hyponatremia of 135 and this MD feels it safe to resume this medication for BP control. PCP should monitor Sodium and potassium on HCTZ  "

## 2019-04-03 NOTE — PROGRESS NOTES
[facility] Hospital Medicine  Progress Note    Patient Name: Esperanza Aquino  MRN: [id number]  Patient Class: IP- Inpatient   Admission Date: 4/2/2019  Length of Stay: 0 days  Attending Physician: Tana Jack MD  Primary Care Provider: Jaron Damon MD        Subjective:     Principal Problem:Acute on chronic respiratory failure with hypoxia    HPI:  Esperanza Aquino is a 58 y.o. female patient with a PMHx of HTN, severe COPD, bipolar disorder, and continued tobacco smoking, who presented to the Emergency Department with c/o SOB   that has progressively worsened over the past 4-5 days.  Associated congestion, cough  producing green sputum, and chills.  No aggravating or relieving factors.  Denies any CP,  palpitations, orthopnea, PND, edema, ABD pain, N/V/D, dizziness, syncope, or fever.  She is on home O2 @ 3L NC PRN, but admits to noncompliance.  She admits to using E-Vapor and smoking cigarettes.  Upon arrival to ED, patient hypoxic with O2 sat 87% on room air.  She was placed on 2 L nasal cannula and DuoNeb treatment given with improved O2 sat of 95%.  Initial workup resulted WBC 13, lactic 1.4, procalcitonin 5.58, troponin negative, BNP 24, K 2.9, Na 131, Mg 1.2.  ABG resulted pH 7.488, pCO2 35.8, pO2 42, SaO2 82 on room air. CXR showed left lower lobe infiltrate consistent with PNA.  Hospital Medicine was called for admission.      Hospital Course:  4/3  Patient seen  Her friend Mr Gunnar Moise  is at bedside. (she lives with him and patient wants him to make decisions for her in event that she cannot for herself. She became tearful when asked if she had family who could make decision for her)  Patient admitted with hypoxemia related to AECOPD, CAPna and non compliance with home O2    Interval History: no new complaints    Review of Systems   Constitutional: Positive for chills. Negative for fever.   Respiratory: Positive for cough (productive of sputum). Negative for  chest tightness.      Objective:     Vital Signs (Most Recent):  Temp: 97.3 °F (36.3 °C) (04/03/19 1209)  Pulse: 75 (04/03/19 1209)  Resp: 18 (04/03/19 1209)  BP: (!) 140/65 (04/03/19 1209)  SpO2: (!) 93 % (04/03/19 1209) Vital Signs (24h Range):  Temp:  [97.3 °F (36.3 °C)-98.4 °F (36.9 °C)] 97.3 °F (36.3 °C)  Pulse:  [] 75  Resp:  [18-22] 18  SpO2:  [87 %-95 %] 93 %  BP: (122-157)/(60-80) 140/65     Weight: 111 kg (244 lb 13.1 oz)  Body mass index is 40.74 kg/m².    Intake/Output Summary (Last 24 hours) at 4/3/2019 1212  Last data filed at 4/3/2019 0816  Gross per 24 hour   Intake 4063 ml   Output --   Net 4063 ml      Physical Exam   Constitutional: She is oriented to person, place, and time. She appears well-nourished. No distress.   Obese  Not in resp distress  Met sleeping and is quite sleepy   HENT:   Head: Normocephalic and atraumatic.   Eyes: Pupils are equal, round, and reactive to light. EOM are normal.   Neck: Normal range of motion.   Cardiovascular: Normal rate, regular rhythm and normal heart sounds.   No murmur heard.  Pulmonary/Chest: Effort normal. No respiratory distress. She has rales (bilateral rales+).   Abdominal: Soft. Bowel sounds are normal.   obese   Musculoskeletal: Normal range of motion. She exhibits no edema.   Neurological: She is oriented to person, place, and time. No cranial nerve deficit.   No focal weakness   Skin: Skin is warm and dry. She is not diaphoretic.   Psychiatric:   Tearful  Flat affect       Significant Labs:   Blood Culture: No results for input(s): LABBLOO in the last 48 hours.  BMP:   Recent Labs   Lab 04/03/19  0433      *   K 3.2*      CO2 26   BUN 10   CREATININE 0.7   CALCIUM 8.3*   MG 1.2*     CBC:   Recent Labs   Lab 04/03/19  0010 04/03/19  0433   WBC 13.34* 10.22   HGB 13.7 11.9*   HCT 39.3 36.8*    231       Significant Imaging:  Imaging Results          X-Ray Chest AP Portable (Final result)  Result time 04/03/19 07:49:03     Final result by Cristo Tay MD (04/03/19 07:49:03)                 Impression:      In comparison to the prior study, there is no adverse interval changes      Electronically signed by: Cristo Tay MD  Date:    04/03/2019  Time:    07:49             Narrative:    EXAMINATION:  XR CHEST AP PORTABLE    CLINICAL HISTORY:  sob;    TECHNIQUE:  Single frontal view of the chest was performed.    COMPARISON:  10/12/2018    FINDINGS:  Cardiomegaly.  Stable edema or infiltrates at the lung bases.  Trace bilateral pleural effusions suspected.  No pneumothorax.  Advanced degenerative changes of the bones.  In comparison to the prior study, there is no adverse interval changes.                              Assessment/Plan:      * Acute on chronic respiratory failure with hypoxia  - Secondary to PNA + COPD.  - Continue supplemental O2, titrate as needed (on 3L NC as needed at home).  - Continue bronchodilators + ICS.  - Empiric antibiotics.    4/3  Acute hypoxia is resolve  Pt met on her baseline of 3L with appropriate sats (88-92% is acceptable)  Treat underlying AECOPD & CAPna     Left lower lobe pneumonia due to infectious organism  - Empiric IV Rocephin and azithromycin.  - Continue bronchodilators.  - Supplemental O2 as needed.  - F/U imaging.    4/3  Cont Ceftriaxone & Zithromax  Repeat CXR as needed  Pulm toilet    Chronic obstructive pulmonary disease with acute exacerbation  - O2 sat stable on 3L NC.  Continue supplementation, wean as tolerated.  - Bronchodilators + ICS, and empiric antibiotics as above.  - Social work consult to ensure home O2 at DC.    4/3  Cont current regimen  Will confirm what exactly her reaction to Prednisone is: for now no systemic steroids ordered    Hyponatremia  - Initial Na 131.  - 3 L normal saline IVFs given in ED.  - Will hold home HCTZ for now.  - Follow labs.    4/3  Mild hyponatremia  Cont to hold home HCTZ  Repeat in AM    Hypomagnesemia  - Initial Mg 1.2, will replete to keep  >/= 2.  - Repeat Mg in AM.    Hypokalemia  - Initial K 2.9, asymptomatic and EKG stable.  - Will replete to keep K >/= 4.  - Repeat BMP in AM.    4/3  Recheck in AM    Acute on chronic respiratory failure with hypoxia and hypercapnia        Tobacco user  - Counseled on cessation.  - Nicotine patch if needed.    Essential hypertension  - Blood pressure stable.  - Continue home Lopressor.      VTE Risk Mitigation (From admission, onward)        Ordered     enoxaparin injection 40 mg  Daily      04/03/19 0640     Place sequential compression device  Until discontinued      04/03/19 0640        Addendum: spoke with patient who says her allergic reaction to steroids is elevated blood pressure and her PCP told her that the high blood pressure is an allergic reaction to steroids  She denied angioedema, hives, SOB.  Will prescribe 40mg IV Solumedrl for her AECOPD and monitor closely.      Tana Jack MD  Department of Hospital Medicine   [facility]

## 2019-04-03 NOTE — ASSESSMENT & PLAN NOTE
- Empiric IV Rocephin and azithromycin.  - Continue bronchodilators.  - Supplemental O2 as needed.  - F/U imaging.    4/3  Cont Ceftriaxone & Zithromax  Repeat CXR as needed  Pulm toilet

## 2019-04-04 PROBLEM — E87.1 HYPONATREMIA: Status: RESOLVED | Noted: 2019-04-03 | Resolved: 2019-04-04

## 2019-04-04 PROBLEM — R73.9 HYPERGLYCEMIA: Status: ACTIVE | Noted: 2019-04-04

## 2019-04-04 LAB
ANION GAP SERPL CALC-SCNC: 14 MMOL/L (ref 8–16)
BASOPHILS # BLD AUTO: 0.01 K/UL (ref 0–0.2)
BASOPHILS NFR BLD: 0.1 % (ref 0–1.9)
BUN SERPL-MCNC: 8 MG/DL (ref 6–20)
CALCIUM SERPL-MCNC: 9.4 MG/DL (ref 8.7–10.5)
CHLORIDE SERPL-SCNC: 98 MMOL/L (ref 95–110)
CO2 SERPL-SCNC: 22 MMOL/L (ref 23–29)
CREAT SERPL-MCNC: 0.8 MG/DL (ref 0.5–1.4)
DIFFERENTIAL METHOD: ABNORMAL
EOSINOPHIL # BLD AUTO: 0 K/UL (ref 0–0.5)
EOSINOPHIL NFR BLD: 0 % (ref 0–8)
ERYTHROCYTE [DISTWIDTH] IN BLOOD BY AUTOMATED COUNT: 13.3 % (ref 11.5–14.5)
EST. GFR  (AFRICAN AMERICAN): >60 ML/MIN/1.73 M^2
EST. GFR  (NON AFRICAN AMERICAN): >60 ML/MIN/1.73 M^2
ESTIMATED AVG GLUCOSE: 123 MG/DL (ref 68–131)
GLUCOSE SERPL-MCNC: 221 MG/DL (ref 70–110)
HBA1C MFR BLD HPLC: 5.9 % (ref 4–5.6)
HCT VFR BLD AUTO: 40.1 % (ref 37–48.5)
HGB BLD-MCNC: 13.1 G/DL (ref 12–16)
LYMPHOCYTES # BLD AUTO: 1.1 K/UL (ref 1–4.8)
LYMPHOCYTES NFR BLD: 12.8 % (ref 18–48)
MAGNESIUM SERPL-MCNC: 1.8 MG/DL (ref 1.6–2.6)
MCH RBC QN AUTO: 30.8 PG (ref 27–31)
MCHC RBC AUTO-ENTMCNC: 32.7 G/DL (ref 32–36)
MCV RBC AUTO: 94 FL (ref 82–98)
MONOCYTES # BLD AUTO: 0.2 K/UL (ref 0.3–1)
MONOCYTES NFR BLD: 2.7 % (ref 4–15)
NEUTROPHILS # BLD AUTO: 7.4 K/UL (ref 1.8–7.7)
NEUTROPHILS NFR BLD: 86 % (ref 38–73)
PLATELET # BLD AUTO: 312 K/UL (ref 150–350)
PLATELET BLD QL SMEAR: ABNORMAL
PMV BLD AUTO: 9.7 FL (ref 9.2–12.9)
POCT GLUCOSE: 278 MG/DL (ref 70–110)
POTASSIUM SERPL-SCNC: 3.9 MMOL/L (ref 3.5–5.1)
RBC # BLD AUTO: 4.25 M/UL (ref 4–5.4)
SODIUM SERPL-SCNC: 134 MMOL/L (ref 136–145)
WBC # BLD AUTO: 8.74 K/UL (ref 3.9–12.7)

## 2019-04-04 PROCEDURE — 85025 COMPLETE CBC W/AUTO DIFF WBC: CPT

## 2019-04-04 PROCEDURE — 63600175 PHARM REV CODE 636 W HCPCS: Performed by: NURSE PRACTITIONER

## 2019-04-04 PROCEDURE — 27000221 HC OXYGEN, UP TO 24 HOURS

## 2019-04-04 PROCEDURE — 25000003 PHARM REV CODE 250: Performed by: INTERNAL MEDICINE

## 2019-04-04 PROCEDURE — 83735 ASSAY OF MAGNESIUM: CPT

## 2019-04-04 PROCEDURE — 94664 DEMO&/EVAL PT USE INHALER: CPT

## 2019-04-04 PROCEDURE — 21400001 HC TELEMETRY ROOM

## 2019-04-04 PROCEDURE — 96372 THER/PROPH/DIAG INJ SC/IM: CPT

## 2019-04-04 PROCEDURE — 27000646 HC AEROBIKA DEVICE

## 2019-04-04 PROCEDURE — 99900037 HC PT THERAPY SCREENING (STAT)

## 2019-04-04 PROCEDURE — 25000242 PHARM REV CODE 250 ALT 637 W/ HCPCS: Performed by: INTERNAL MEDICINE

## 2019-04-04 PROCEDURE — 63600175 PHARM REV CODE 636 W HCPCS: Performed by: INTERNAL MEDICINE

## 2019-04-04 PROCEDURE — 99900035 HC TECH TIME PER 15 MIN (STAT)

## 2019-04-04 PROCEDURE — 83036 HEMOGLOBIN GLYCOSYLATED A1C: CPT

## 2019-04-04 PROCEDURE — 80048 BASIC METABOLIC PNL TOTAL CA: CPT

## 2019-04-04 PROCEDURE — 25000242 PHARM REV CODE 250 ALT 637 W/ HCPCS: Performed by: NURSE PRACTITIONER

## 2019-04-04 PROCEDURE — 36415 COLL VENOUS BLD VENIPUNCTURE: CPT

## 2019-04-04 PROCEDURE — 25000003 PHARM REV CODE 250: Performed by: NURSE PRACTITIONER

## 2019-04-04 PROCEDURE — 94760 N-INVAS EAR/PLS OXIMETRY 1: CPT

## 2019-04-04 PROCEDURE — 94640 AIRWAY INHALATION TREATMENT: CPT

## 2019-04-04 RX ORDER — AMLODIPINE BESYLATE 10 MG/1
10 TABLET ORAL DAILY
Status: DISCONTINUED | OUTPATIENT
Start: 2019-04-05 | End: 2019-04-05 | Stop reason: HOSPADM

## 2019-04-04 RX ORDER — IBUPROFEN 200 MG
16 TABLET ORAL
Status: DISCONTINUED | OUTPATIENT
Start: 2019-04-04 | End: 2019-04-05 | Stop reason: HOSPADM

## 2019-04-04 RX ORDER — IBUPROFEN 200 MG
24 TABLET ORAL
Status: DISCONTINUED | OUTPATIENT
Start: 2019-04-04 | End: 2019-04-05 | Stop reason: HOSPADM

## 2019-04-04 RX ORDER — PREDNISONE 20 MG/1
40 TABLET ORAL DAILY
Status: DISCONTINUED | OUTPATIENT
Start: 2019-04-04 | End: 2019-04-05 | Stop reason: HOSPADM

## 2019-04-04 RX ORDER — GLUCAGON 1 MG
1 KIT INJECTION
Status: DISCONTINUED | OUTPATIENT
Start: 2019-04-04 | End: 2019-04-05 | Stop reason: HOSPADM

## 2019-04-04 RX ORDER — AMLODIPINE BESYLATE 5 MG/1
5 TABLET ORAL ONCE
Status: COMPLETED | OUTPATIENT
Start: 2019-04-04 | End: 2019-04-04

## 2019-04-04 RX ORDER — INSULIN ASPART 100 [IU]/ML
0-5 INJECTION, SOLUTION INTRAVENOUS; SUBCUTANEOUS
Status: DISCONTINUED | OUTPATIENT
Start: 2019-04-04 | End: 2019-04-05 | Stop reason: HOSPADM

## 2019-04-04 RX ADMIN — LEVOTHYROXINE SODIUM 112 MCG: 112 TABLET ORAL at 05:04

## 2019-04-04 RX ADMIN — METOPROLOL TARTRATE 50 MG: 50 TABLET ORAL at 10:04

## 2019-04-04 RX ADMIN — LORAZEPAM 1 MG: 0.5 TABLET ORAL at 03:04

## 2019-04-04 RX ADMIN — OXYBUTYNIN CHLORIDE 5 MG: 5 TABLET ORAL at 10:04

## 2019-04-04 RX ADMIN — INSULIN ASPART 3 UNITS: 100 INJECTION, SOLUTION INTRAVENOUS; SUBCUTANEOUS at 05:04

## 2019-04-04 RX ADMIN — ATORVASTATIN CALCIUM 20 MG: 10 TABLET, FILM COATED ORAL at 10:04

## 2019-04-04 RX ADMIN — CETIRIZINE HYDROCHLORIDE 10 MG: 10 TABLET, FILM COATED ORAL at 10:04

## 2019-04-04 RX ADMIN — CEFTRIAXONE 1 G: 1 INJECTION, SOLUTION INTRAVENOUS at 12:04

## 2019-04-04 RX ADMIN — METHYLPREDNISOLONE SODIUM SUCCINATE 40 MG: 40 INJECTION, POWDER, FOR SOLUTION INTRAMUSCULAR; INTRAVENOUS at 05:04

## 2019-04-04 RX ADMIN — DIVALPROEX SODIUM 500 MG: 500 TABLET, DELAYED RELEASE ORAL at 06:04

## 2019-04-04 RX ADMIN — IPRATROPIUM BROMIDE AND ALBUTEROL SULFATE 3 ML: .5; 3 SOLUTION RESPIRATORY (INHALATION) at 09:04

## 2019-04-04 RX ADMIN — AZITHROMYCIN MONOHYDRATE 500 MG: 500 INJECTION, POWDER, LYOPHILIZED, FOR SOLUTION INTRAVENOUS at 08:04

## 2019-04-04 RX ADMIN — BUDESONIDE 0.5 MG: 0.5 SUSPENSION RESPIRATORY (INHALATION) at 09:04

## 2019-04-04 RX ADMIN — DIVALPROEX SODIUM 1000 MG: 500 TABLET, DELAYED RELEASE ORAL at 10:04

## 2019-04-04 RX ADMIN — AMLODIPINE BESYLATE 5 MG: 5 TABLET ORAL at 11:04

## 2019-04-04 RX ADMIN — GUAIFENESIN 600 MG: 600 TABLET, EXTENDED RELEASE ORAL at 10:04

## 2019-04-04 RX ADMIN — AMLODIPINE BESYLATE 5 MG: 5 TABLET ORAL at 10:04

## 2019-04-04 RX ADMIN — FLUTICASONE PROPIONATE 100 MCG: 50 SPRAY, METERED NASAL at 10:04

## 2019-04-04 RX ADMIN — MONTELUKAST SODIUM 10 MG: 10 TABLET, FILM COATED ORAL at 10:04

## 2019-04-04 RX ADMIN — OLANZAPINE 10 MG: 5 TABLET, FILM COATED ORAL at 10:04

## 2019-04-04 RX ADMIN — POLYETHYLENE GLYCOL 3350 17 G: 17 POWDER, FOR SOLUTION ORAL at 10:04

## 2019-04-04 RX ADMIN — PANTOPRAZOLE SODIUM 40 MG: 40 TABLET, DELAYED RELEASE ORAL at 10:04

## 2019-04-04 RX ADMIN — BUPROPION HYDROCHLORIDE 300 MG: 150 TABLET, EXTENDED RELEASE ORAL at 10:04

## 2019-04-04 RX ADMIN — ARFORMOTEROL TARTRATE 15 MCG: 15 SOLUTION RESPIRATORY (INHALATION) at 09:04

## 2019-04-04 RX ADMIN — IPRATROPIUM BROMIDE AND ALBUTEROL SULFATE 3 ML: .5; 3 SOLUTION RESPIRATORY (INHALATION) at 01:04

## 2019-04-04 RX ADMIN — LORAZEPAM 1 MG: 0.5 TABLET ORAL at 10:04

## 2019-04-04 RX ADMIN — ENOXAPARIN SODIUM 40 MG: 100 INJECTION SUBCUTANEOUS at 04:04

## 2019-04-04 NOTE — ASSESSMENT & PLAN NOTE
Now diet controlled  Is likely due to steroids  SSI  Check A1c however as she may be requiring of antidiabetic meds again

## 2019-04-04 NOTE — PROGRESS NOTES
Home Oxygen Evaluation    Date Performed: 2019    1) Patient's Home O2 Sat on room air, while at rest: 90%        If O2 sats on room air at rest are 88% or below, patient qualifies. No additional testing needed. Document N/A in steps 2 and 3. If 89% or above, complete steps 2.      2) Patient's O2 Sat on room air while exercisin%        If O2 sats on room air while exercising remain 89% or above patient does not qualify, no further testing needed Document N/A in step 3. If O2 sats on room air while exercising are 88% or below, continue to step 3.      3) Patient's O2 Sat while exercising on O2: 92% at 3 LPM         (Must show improvement from #2 for patients to qualify)    If O2 sats improve on oxygen, patient qualifies for portable oxygen. If not, the patient does not qualify.

## 2019-04-04 NOTE — ASSESSMENT & PLAN NOTE
- Secondary to PNA + COPD.  - Continue supplemental O2, titrate as needed (on 3L NC as needed at home).  - Continue bronchodilators + ICS.  - Empiric antibiotics.    4/3  Acute hypoxia is resolve  Pt met on her baseline of 3L with appropriate sats (88-92% is acceptable)  Treat underlying AECOPD & CAPna     4/4  Resolved  Back to baseline O2 requirements

## 2019-04-04 NOTE — SUBJECTIVE & OBJECTIVE
Interval History: improved  Review of Systems   Constitutional: Negative for chills and fever.   Respiratory: Positive for cough (improving). Negative for chest tightness.      Objective:     Vital Signs (Most Recent):  Temp: 98 °F (36.7 °C) (04/04/19 0857)  Pulse: 82 (04/04/19 1023)  Resp: 16 (04/04/19 0857)  BP: (!) 150/72 (04/04/19 1023)  SpO2: (!) 93 % (04/04/19 0857) Vital Signs (24h Range):  Temp:  [97.3 °F (36.3 °C)-98 °F (36.7 °C)] 98 °F (36.7 °C)  Pulse:  [66-88] 82  Resp:  [16-20] 16  SpO2:  [87 %-94 %] 93 %  BP: (126-150)/(58-72) 150/72     Weight: 111 kg (244 lb 13.1 oz)  Body mass index is 40.74 kg/m².    Intake/Output Summary (Last 24 hours) at 4/4/2019 1035  Last data filed at 4/3/2019 2117  Gross per 24 hour   Intake 740 ml   Output 350 ml   Net 390 ml      Physical Exam   Constitutional: She is oriented to person, place, and time. She appears well-nourished. No distress.   Obese     HENT:   Head: Normocephalic and atraumatic.   Eyes: Pupils are equal, round, and reactive to light. EOM are normal.   Neck: Normal range of motion.   Cardiovascular: Normal rate, regular rhythm and normal heart sounds.   No murmur heard.  Pulmonary/Chest: Effort normal. No respiratory distress. She has no rales.   Abdominal: Soft. Bowel sounds are normal.   obese   Musculoskeletal: Normal range of motion. She exhibits no edema.   Neurological: She is oriented to person, place, and time. No cranial nerve deficit.   No focal weakness   Skin: Skin is warm and dry. She is not diaphoretic.   Psychiatric:   Mood is much better today  Feels better and is more interactive       Significant Labs:   BMP:   Recent Labs   Lab 04/04/19  0410   *   *   K 3.9   CL 98   CO2 22*   BUN 8   CREATININE 0.8   CALCIUM 9.4   MG 1.8     CBC:   Recent Labs   Lab 04/03/19  0010 04/03/19  0433 04/04/19  0410   WBC 13.34* 10.22 8.74   HGB 13.7 11.9* 13.1   HCT 39.3 36.8* 40.1    231 312       Significant Imaging: I have reviewed  and interpreted all pertinent imaging results/findings within the past 24 hours.

## 2019-04-04 NOTE — ASSESSMENT & PLAN NOTE
"- O2 sat stable on 3L NC.  Continue supplementation, wean as tolerated.  - Bronchodilators + ICS, and empiric antibiotics as above.  - Social work consult to ensure home O2 at DC.    4/3  Cont current regimen  Will confirm what exactly her reaction to Prednisone is: for now no systemic steroids ordered    4/4  Her "allergic" reaction to steroids is "elevated BP"   Tolerated IV Soumedrol yesterday  Switch to Prednisone  Cont other management  "

## 2019-04-04 NOTE — ASSESSMENT & PLAN NOTE
- Initial K 2.9, asymptomatic and EKG stable.  - Will replete to keep K >/= 4.  - Repeat BMP in AM.    4/3  Recheck in AM    4/4  Resolved

## 2019-04-04 NOTE — PLAN OF CARE
Problem: Adult Inpatient Plan of Care  Goal: Absence of Hospital-Acquired Illness or Injury  Outcome: Ongoing (interventions implemented as appropriate)  Intervention: Prevent Skin Injury  Encouraged patient to reposition to prevent skin injury. Verbalizes understanding.  Intervention: Prevent Infection  Aseptic technique maintained    Goal: Optimal Comfort and Wellbeing  Outcome: Ongoing (interventions implemented as appropriate)  Intervention: Monitor Pain and Promote Comfort  Voice no complaints of pain.      Comments: Client sits on side of bed able to reposition self independently. IV site intact. No new skin issues. Chart reviewed.

## 2019-04-04 NOTE — PROGRESS NOTES
[facility] Hospital Medicine  Progress Note    Patient Name: Esperanza Aquino  MRN: [id number]  Patient Class: IP- Inpatient   Admission Date: 4/2/2019  Length of Stay: 1 days  Attending Physician: Tana Jack MD  Primary Care Provider: Jaron Damon MD        Subjective:     Principal Problem:Acute on chronic respiratory failure with hypoxia    HPI:  Esperanza Aquino is a 58 y.o. female patient with a PMHx of HTN, severe COPD, bipolar disorder, and continued tobacco smoking, who presented to the Emergency Department with c/o SOB   that has progressively worsened over the past 4-5 days.  Associated congestion, cough  producing green sputum, and chills.  No aggravating or relieving factors.  Denies any CP,  palpitations, orthopnea, PND, edema, ABD pain, N/V/D, dizziness, syncope, or fever.  She is on home O2 @ 3L NC PRN, but admits to noncompliance.  She admits to using E-Vapor and smoking cigarettes.  Upon arrival to ED, patient hypoxic with O2 sat 87% on room air.  She was placed on 2 L nasal cannula and DuoNeb treatment given with improved O2 sat of 95%.  Initial workup resulted WBC 13, lactic 1.4, procalcitonin 5.58, troponin negative, BNP 24, K 2.9, Na 131, Mg 1.2.  ABG resulted pH 7.488, pCO2 35.8, pO2 42, SaO2 82 on room air. CXR showed left lower lobe infiltrate consistent with PNA.  Hospital Medicine was called for admission.      Hospital Course:  4/3  Patient seen  Her friend Mr Gunnar Moise  is at bedside. (she lives with him and patient wants him to make decisions for her in event that she cannot for herself. She became tearful when asked if she had family who could make decision for her)  Patient admitted with hypoxemia related to AECOPD, CAPna and non compliance with home O2    4/4  Looks and feels better  Procal returned elevated  Plan is for AM discharge    Interval History: improved  Review of Systems   Constitutional: Negative for chills and fever.    Respiratory: Positive for cough (improving). Negative for chest tightness.      Objective:     Vital Signs (Most Recent):  Temp: 98 °F (36.7 °C) (04/04/19 0857)  Pulse: 82 (04/04/19 1023)  Resp: 16 (04/04/19 0857)  BP: (!) 150/72 (04/04/19 1023)  SpO2: (!) 93 % (04/04/19 0857) Vital Signs (24h Range):  Temp:  [97.3 °F (36.3 °C)-98 °F (36.7 °C)] 98 °F (36.7 °C)  Pulse:  [66-88] 82  Resp:  [16-20] 16  SpO2:  [87 %-94 %] 93 %  BP: (126-150)/(58-72) 150/72     Weight: 111 kg (244 lb 13.1 oz)  Body mass index is 40.74 kg/m².    Intake/Output Summary (Last 24 hours) at 4/4/2019 1035  Last data filed at 4/3/2019 2117  Gross per 24 hour   Intake 740 ml   Output 350 ml   Net 390 ml      Physical Exam   Constitutional: She is oriented to person, place, and time. She appears well-nourished. No distress.   Obese     HENT:   Head: Normocephalic and atraumatic.   Eyes: Pupils are equal, round, and reactive to light. EOM are normal.   Neck: Normal range of motion.   Cardiovascular: Normal rate, regular rhythm and normal heart sounds.   No murmur heard.  Pulmonary/Chest: Effort normal. No respiratory distress. She has no rales.   Abdominal: Soft. Bowel sounds are normal.   obese   Musculoskeletal: Normal range of motion. She exhibits no edema.   Neurological: She is oriented to person, place, and time. No cranial nerve deficit.   No focal weakness   Skin: Skin is warm and dry. She is not diaphoretic.   Psychiatric:   Mood is much better today  Feels better and is more interactive       Significant Labs:   BMP:   Recent Labs   Lab 04/04/19  0410   *   *   K 3.9   CL 98   CO2 22*   BUN 8   CREATININE 0.8   CALCIUM 9.4   MG 1.8     CBC:   Recent Labs   Lab 04/03/19  0010 04/03/19  0433 04/04/19  0410   WBC 13.34* 10.22 8.74   HGB 13.7 11.9* 13.1   HCT 39.3 36.8* 40.1    231 312       Significant Imaging: I have reviewed and interpreted all pertinent imaging results/findings within the past 24  "hours.    Assessment/Plan:      * Acute on chronic respiratory failure with hypoxia  - Secondary to PNA + COPD.  - Continue supplemental O2, titrate as needed (on 3L NC as needed at home).  - Continue bronchodilators + ICS.  - Empiric antibiotics.    4/3  Acute hypoxia is resolve  Pt met on her baseline of 3L with appropriate sats (88-92% is acceptable)  Treat underlying AECOPD & CAPna     4/4  Resolved  Back to baseline O2 requirements    Left lower lobe pneumonia due to infectious organism  - Empiric IV Rocephin and azithromycin.  - Continue bronchodilators.  - Supplemental O2 as needed.  - F/U imaging.    4/3  Cont Ceftriaxone & Zithromax  Repeat CXR as needed  Pulm toilet    4/4  Cont current regimen  Plan is for AM discharge  Blood culture NGTD    Chronic obstructive pulmonary disease with acute exacerbation  - O2 sat stable on 3L NC.  Continue supplementation, wean as tolerated.  - Bronchodilators + ICS, and empiric antibiotics as above.  - Social work consult to ensure home O2 at DC.    4/3  Cont current regimen  Will confirm what exactly her reaction to Prednisone is: for now no systemic steroids ordered    4/4  Her "allergic" reaction to steroids is "elevated BP"   Tolerated IV Soumedrol yesterday  Switch to Prednisone  Cont other management    Hyperglycemia  Now diet controlled  Is likely due to steroids  SSI  Check A1c however as she may be requiring of antidiabetic meds again      Hypomagnesemia  - Initial Mg 1.2, will replete to keep >/= 2.  - Repeat Mg in AM.    Hypokalemia  - Initial K 2.9, asymptomatic and EKG stable.  - Will replete to keep K >/= 4.  - Repeat BMP in AM.    4/3  Recheck in AM    4/4  Resolved      Acute on chronic respiratory failure with hypoxia and hypercapnia        Tobacco user  - Counseled on cessation.  - Nicotine patch if needed.    Essential hypertension  Lopressor.  Amlodipine added      VTE Risk Mitigation (From admission, onward)        Ordered     IP VTE HIGH RISK PATIENT "  Once      04/03/19 1853     Place ADITHYA hose  Until discontinued      04/03/19 1853     enoxaparin injection 40 mg  Daily      04/03/19 0640     Place sequential compression device  Until discontinued      04/03/19 0640              Tana Jack MD  Department of Hospital Medicine   [facility]

## 2019-04-04 NOTE — ASSESSMENT & PLAN NOTE
- Empiric IV Rocephin and azithromycin.  - Continue bronchodilators.  - Supplemental O2 as needed.  - F/U imaging.    4/3  Cont Ceftriaxone & Zithromax  Repeat CXR as needed  Pulm toilet    4/4  Cont current regimen  Plan is for AM discharge  Blood culture NGTD

## 2019-04-04 NOTE — PLAN OF CARE
Problem: Adult Inpatient Plan of Care  Goal: Plan of Care Review  Outcome: Ongoing (interventions implemented as appropriate)  No acute events overnight. O2 in place via 3L NC, tolerating well. Denies SOB. Anxiety controlled with po prn Ativan. NSR on the monitor. Chart reviewed. Will monitor.

## 2019-04-05 VITALS
TEMPERATURE: 99 F | DIASTOLIC BLOOD PRESSURE: 68 MMHG | HEART RATE: 77 BPM | BODY MASS INDEX: 40.79 KG/M2 | SYSTOLIC BLOOD PRESSURE: 127 MMHG | OXYGEN SATURATION: 95 % | HEIGHT: 65 IN | WEIGHT: 244.81 LBS | RESPIRATION RATE: 18 BRPM

## 2019-04-05 PROBLEM — J96.21 ACUTE ON CHRONIC RESPIRATORY FAILURE WITH HYPOXIA: Status: RESOLVED | Noted: 2018-01-24 | Resolved: 2019-04-05

## 2019-04-05 LAB
POCT GLUCOSE: 109 MG/DL (ref 70–110)
POCT GLUCOSE: 189 MG/DL (ref 70–110)

## 2019-04-05 PROCEDURE — 63600175 PHARM REV CODE 636 W HCPCS: Performed by: INTERNAL MEDICINE

## 2019-04-05 PROCEDURE — 97116 GAIT TRAINING THERAPY: CPT

## 2019-04-05 PROCEDURE — 94640 AIRWAY INHALATION TREATMENT: CPT

## 2019-04-05 PROCEDURE — 27000221 HC OXYGEN, UP TO 24 HOURS

## 2019-04-05 PROCEDURE — 25000003 PHARM REV CODE 250: Performed by: INTERNAL MEDICINE

## 2019-04-05 PROCEDURE — 25000242 PHARM REV CODE 250 ALT 637 W/ HCPCS: Performed by: INTERNAL MEDICINE

## 2019-04-05 PROCEDURE — 63600175 PHARM REV CODE 636 W HCPCS: Performed by: NURSE PRACTITIONER

## 2019-04-05 PROCEDURE — 25000003 PHARM REV CODE 250: Performed by: NURSE PRACTITIONER

## 2019-04-05 PROCEDURE — 94664 DEMO&/EVAL PT USE INHALER: CPT

## 2019-04-05 PROCEDURE — 99900035 HC TECH TIME PER 15 MIN (STAT)

## 2019-04-05 PROCEDURE — 97161 PT EVAL LOW COMPLEX 20 MIN: CPT

## 2019-04-05 PROCEDURE — 25000242 PHARM REV CODE 250 ALT 637 W/ HCPCS: Performed by: NURSE PRACTITIONER

## 2019-04-05 PROCEDURE — 94761 N-INVAS EAR/PLS OXIMETRY MLT: CPT

## 2019-04-05 RX ORDER — LEVOFLOXACIN 750 MG/1
750 TABLET ORAL DAILY
Status: DISCONTINUED | OUTPATIENT
Start: 2019-04-05 | End: 2019-04-05 | Stop reason: HOSPADM

## 2019-04-05 RX ORDER — IPRATROPIUM BROMIDE AND ALBUTEROL SULFATE 2.5; .5 MG/3ML; MG/3ML
3 SOLUTION RESPIRATORY (INHALATION) EVERY 4 HOURS PRN
Qty: 90 ML | Refills: 0 | Status: SHIPPED | OUTPATIENT
Start: 2019-04-05

## 2019-04-05 RX ORDER — POTASSIUM CHLORIDE 750 MG/1
10 CAPSULE, EXTENDED RELEASE ORAL DAILY
Qty: 10 CAPSULE | Refills: 0 | Status: SHIPPED | OUTPATIENT
Start: 2019-04-06 | End: 2019-04-16

## 2019-04-05 RX ORDER — LEVOFLOXACIN 750 MG/1
750 TABLET ORAL DAILY
Qty: 4 TABLET | Refills: 0 | Status: SHIPPED | OUTPATIENT
Start: 2019-04-06

## 2019-04-05 RX ORDER — PREDNISONE 20 MG/1
40 TABLET ORAL DAILY
Qty: 6 TABLET | Refills: 0 | Status: SHIPPED | OUTPATIENT
Start: 2019-04-05 | End: 2019-04-08

## 2019-04-05 RX ORDER — GUAIFENESIN 600 MG/1
600 TABLET, EXTENDED RELEASE ORAL 2 TIMES DAILY
Qty: 12 TABLET | Refills: 0 | Status: SHIPPED | OUTPATIENT
Start: 2019-04-05 | End: 2019-04-11

## 2019-04-05 RX ADMIN — LORAZEPAM 1 MG: 0.5 TABLET ORAL at 09:04

## 2019-04-05 RX ADMIN — OXYBUTYNIN CHLORIDE 5 MG: 5 TABLET ORAL at 10:04

## 2019-04-05 RX ADMIN — CETIRIZINE HYDROCHLORIDE 10 MG: 10 TABLET, FILM COATED ORAL at 10:04

## 2019-04-05 RX ADMIN — LEVOFLOXACIN 750 MG: 750 TABLET, FILM COATED ORAL at 10:04

## 2019-04-05 RX ADMIN — MONTELUKAST SODIUM 10 MG: 10 TABLET, FILM COATED ORAL at 10:04

## 2019-04-05 RX ADMIN — PANTOPRAZOLE SODIUM 40 MG: 40 TABLET, DELAYED RELEASE ORAL at 10:04

## 2019-04-05 RX ADMIN — AMLODIPINE BESYLATE 10 MG: 10 TABLET ORAL at 10:04

## 2019-04-05 RX ADMIN — METOPROLOL TARTRATE 50 MG: 50 TABLET ORAL at 10:04

## 2019-04-05 RX ADMIN — ARFORMOTEROL TARTRATE 15 MCG: 15 SOLUTION RESPIRATORY (INHALATION) at 07:04

## 2019-04-05 RX ADMIN — FLUTICASONE PROPIONATE 100 MCG: 50 SPRAY, METERED NASAL at 10:04

## 2019-04-05 RX ADMIN — BUPROPION HYDROCHLORIDE 300 MG: 150 TABLET, EXTENDED RELEASE ORAL at 10:04

## 2019-04-05 RX ADMIN — PREDNISONE 40 MG: 20 TABLET ORAL at 10:04

## 2019-04-05 RX ADMIN — DIVALPROEX SODIUM 500 MG: 500 TABLET, DELAYED RELEASE ORAL at 06:04

## 2019-04-05 RX ADMIN — BUDESONIDE 0.5 MG: 0.5 SUSPENSION RESPIRATORY (INHALATION) at 07:04

## 2019-04-05 RX ADMIN — GUAIFENESIN 600 MG: 600 TABLET, EXTENDED RELEASE ORAL at 10:04

## 2019-04-05 RX ADMIN — LEVOTHYROXINE SODIUM 112 MCG: 112 TABLET ORAL at 06:04

## 2019-04-05 RX ADMIN — AZITHROMYCIN MONOHYDRATE 500 MG: 500 INJECTION, POWDER, LYOPHILIZED, FOR SOLUTION INTRAVENOUS at 08:04

## 2019-04-05 RX ADMIN — IPRATROPIUM BROMIDE AND ALBUTEROL SULFATE 3 ML: .5; 3 SOLUTION RESPIRATORY (INHALATION) at 07:04

## 2019-04-05 RX ADMIN — FERROUS GLUCONATE TAB 324 MG (37.5 MG ELEMENTAL IRON) 324 MG: 324 (37.5 FE) TAB at 08:04

## 2019-04-05 NOTE — PLAN OF CARE
Problem: Adult Inpatient Plan of Care  Goal: Plan of Care Review  Remains injury free. Denies c/o pain. Stable for discharge.

## 2019-04-05 NOTE — DISCHARGE SUMMARY
[facility] Hospital Medicine  Discharge Summary      Patient Name: Esperanza Aquino  MRN: [id number]  Admission Date: 4/2/2019  Hospital Length of Stay: 2 days  Discharge Date and Time:  04/05/2019 9:34 AM  Attending Physician: Tana Jack MD   Discharging Provider: Tana Jack MD  Primary Care Provider: Jaron Damon MD      HPI:   Esperanza Aquino is a 58 y.o. female patient with a PMHx of HTN, severe COPD, bipolar disorder, and continued tobacco smoking, who presented to the Emergency Department with c/o SOB   that has progressively worsened over the past 4-5 days.  Associated congestion, cough  producing green sputum, and chills.  No aggravating or relieving factors.  Denies any CP,  palpitations, orthopnea, PND, edema, ABD pain, N/V/D, dizziness, syncope, or fever.  She is on home O2 @ 3L NC PRN, but admits to noncompliance.  She admits to using E-Vapor and smoking cigarettes.  Upon arrival to ED, patient hypoxic with O2 sat 87% on room air.  She was placed on 2 L nasal cannula and DuoNeb treatment given with improved O2 sat of 95%.  Initial workup resulted WBC 13, lactic 1.4, procalcitonin 5.58, troponin negative, BNP 24, K 2.9, Na 131, Mg 1.2.  ABG resulted pH 7.488, pCO2 35.8, pO2 42, SaO2 82 on room air. CXR showed left lower lobe infiltrate consistent with PNA.  Hospital Medicine was called for admission.      * No surgery found *      Hospital Course:   4/3  Patient seen  Her friend Mr Gunnar Moise  is at bedside. (she lives with him and patient wants him to make decisions for her in event that she cannot for herself. She became tearful when asked if she had family who could make decision for her)  Patient admitted with hypoxemia related to AECOPD, CAPna and non compliance with home O2    4/4  Looks and feels better  Procal returned elevated  Plan is for AM discharge    4/5  Pt seen and examined today. She is stable for discharge  Tolerated IV Solu medrol  "without any "allergic" reaction. When askd her reaction to steroids she said elevated blood pressure. Will prescribe 3 days 40mg PO Prednisone which she is safe to get. She was admitted with mild hyponatremia of 135 and this MD feels it safe to resume this medication for BP control. PCP should monitor Sodium and potassium on HCTZ     Consults:   Consults (From admission, onward)        Status Ordering Provider     Inpatient consult to Social Work  Once     Provider:  (Not yet assigned)    Completed LULÚ DAVIS     Inpatient consult to Social Work  Once     Provider:  (Not yet assigned)    Acknowledged AJ RUCKER            Final Active Diagnoses:    Diagnosis Date Noted POA    Left lower lobe pneumonia due to infectious organism [J18.9] 01/24/2018 Yes    Chronic obstructive pulmonary disease with acute exacerbation [J44.1] 01/24/2018 Yes    Hyperglycemia [R73.9] 04/04/2019 Unknown    Hypokalemia [E87.6] 04/03/2019 Unknown    Hypomagnesemia [E83.42] 04/03/2019 Unknown    Tobacco user [Z72.0] 01/24/2018 Yes     Chronic    Essential hypertension [I10] 01/24/2018 Yes     Chronic      Problems Resolved During this Admission:    Diagnosis Date Noted Date Resolved POA    PRINCIPAL PROBLEM:  Acute on chronic respiratory failure with hypoxia [J96.21] 01/24/2018 04/05/2019 Yes    Hyponatremia [E87.1] 04/03/2019 04/04/2019 Unknown       Discharged Condition: stable    Disposition: Home or Self Care    Follow Up:  Follow-up Information     Jaron Damon MD In 3 days.    Specialty:  Family Medicine  Contact information:  [address] [facility] [address] [phone]             Follow up In 1 week.    Why:  PULMONOLOGIST               Patient Instructions:      OXYGEN FOR HOME USE     Order Specific Question Answer Comments   Liter Flow 3    Duration With activity    Qualifying SpO2: 81    Testing done at: Exercise/Activity    Device home concentrator with portable unit    Length of need (in " "months): 99 mos    Patient condition with qualifying saturation COPD    Height: 5' 5" (1.651 m)    Weight: 111 kg (244 lb 13.1 oz)    Does patient have medical equipment at home? oxygen    Alternative treatment measures have been tried or considered and deemed clinically ineffective. Yes      Diet diabetic   Order Comments: Low salt diet       Significant Diagnostic Studies: Labs:   BMP:   Recent Labs   Lab 04/04/19  0410   *   *   K 3.9   CL 98   CO2 22*   BUN 8   CREATININE 0.8   CALCIUM 9.4   MG 1.8   , CMP   Recent Labs   Lab 04/04/19  0410   *   K 3.9   CL 98   CO2 22*   *   BUN 8   CREATININE 0.8   CALCIUM 9.4   ANIONGAP 14   ESTGFRAFRICA >60   EGFRNONAA >60    and CBC   Recent Labs   Lab 04/04/19  0410   WBC 8.74   HGB 13.1   HCT 40.1          Pending Diagnostic Studies:     None         Medications:  Reconciled Home Medications:      Medication List      START taking these medications    guaiFENesin 600 mg 12 hr tablet  Commonly known as:  MUCINEX  Take 1 tablet (600 mg total) by mouth 2 (two) times daily. for 6 days     levoFLOXacin 750 MG tablet  Commonly known as:  LEVAQUIN  Take 1 tablet (750 mg total) by mouth once daily.  Start taking on:  4/6/2019     potassium chloride 10 MEQ Cpsr  Commonly known as:  MICRO-K  Take 1 capsule (10 mEq total) by mouth once daily. for 10 doses  Start taking on:  4/6/2019     predniSONE 20 MG tablet  Commonly known as:  DELTASONE  Take 2 tablets (40 mg total) by mouth once daily. for 3 days        CHANGE how you take these medications    hydroCHLOROthiazide 25 MG tablet  Commonly known as:  HYDRODIURIL  TAKE 1 TABLET BY MOUTH ONCE DAILY  What changed:  Another medication with the same name was removed. Continue taking this medication, and follow the directions you see here.        CONTINUE taking these medications    albuterol 90 mcg/actuation inhaler  Commonly known as:  PROVENTIL/VENTOLIN HFA  INHALE 2 PUFFS INTO THE LUNGS EVERY 8 " HOURS     albuterol-ipratropium 2.5 mg-0.5 mg/3 mL nebulizer solution  Commonly known as:  DUO-NEB  Take 3 mLs by nebulization every 4 (four) hours as needed for Wheezing. Rescue     atorvastatin 20 MG tablet  Commonly known as:  LIPITOR  TAKE ONE TABLET BY MOUTH AT BEDTIME     baclofen 10 MG tablet  Commonly known as:  LIORESAL  TAKE 1 TABLET BY MOUTH THREE TIMES PER DAY AS DIRECTED     BREO ELLIPTA 200-25 mcg/dose Dsdv diskus inhaler  Generic drug:  fluticasone-vilanterol  use 1 inhalation by mouth according to package directions     buPROPion 300 MG 24 hr tablet  Commonly known as:  WELLBUTRIN XL  Take 1 tablet by mouth every morning     cyclobenzaprine 10 MG tablet  Commonly known as:  FLEXERIL  TAKE 1 TABLET BY MOUTH TWICE DAILY AS NEEDED     divalproex  MG Tb24  Commonly known as:  DEPAKOTE  Take 1 TABLET  IN THE MORNING AND 2 AT BEDTIME     ferrous gluconate 324 MG tablet  Commonly known as:  FERGON  Take 1 tablet (324 mg total) by mouth 2 (two) times daily with meals.     fluticasone 50 mcg/actuation nasal spray  Commonly known as:  FLONASE  USE 2 SPRAYS IN EACH NOSTRIL EVERY DAY     hydrOXYzine HCl 25 MG tablet  Commonly known as:  ATARAX  Take 1 tablet by mouth four times daily.     levothyroxine 112 MCG tablet  Commonly known as:  SYNTHROID  TAKE ONE TABLET BY MOUTH EVERY DAY     loratadine 10 mg tablet  Commonly known as:  CLARITIN  TAKE ONE TABLET BY MOUTH EVERY DAY     LORazepam 1 MG tablet  Commonly known as:  ATIVAN  Take 1 tablet by mouth three times daily     metoprolol tartrate 50 MG tablet  Commonly known as:  LOPRESSOR  TAKE 1 TABLET BY MOUTH TWICE PER DAY     montelukast 10 mg tablet  Commonly known as:  SINGULAIR  TAKE ONE TABLET BY MOUTH EVERY DAY     OLANZapine 20 MG tablet  Commonly known as:  ZyPREXA  Take 1 tablet by mouth every night at bedtime     omeprazole 40 MG capsule  Commonly known as:  PRILOSEC  TAKE ONE CAPSULE BY MOUTH EVERY DAY     oxybutynin 5 MG Tab  Commonly known as:   DITROPAN  TAKE 1 TABLET BY MOUTH TWICE DAILY     polyethylene glycol 17 gram/dose powder  Commonly known as:  GLYCOLAX  MIX AND DRINK 17 GRAMS IN 8 OUNCES OF JUICE OR WATER ACCORDING TO PACKAGE DIRECTIONS     traMADol 50 mg tablet  Commonly known as:  ULTRAM  Take 1 tablet by mouth three times daily            Indwelling Lines/Drains at time of discharge:   Lines/Drains/Airways          None          Time spent on the discharge of patient: 30 minutes  Patient was seen and examined on the date of discharge and determined to be suitable for discharge.     A1c was only 5.9. Diet control should suffice    Tana Jack MD  Department of Hospital Medicine  [facility]

## 2019-04-05 NOTE — NURSING
AVS reviewed with patient and spouse. Reviewed medications with patient and educated the importance of taking them properly. Patient's  stated he did not want to go home to get oxygen. That patient will be okay on the way home without it. MD aware. Patient and spouse state they are going to  rx on the way out of the hospital. Did not want them delivered. Patient in no acute distress at this time. No further needs. IV removed per order. Patient tolerated well. Tele monitor removed from patient. Transportation will be provided per  to home.

## 2019-04-05 NOTE — PT/OT/SLP EVAL
"Physical Therapy Evaluation and Discharge Note    Patient Name:  Esperanza Aquino   MRN:  [id number]    Recommendations:     Discharge Recommendations:  home   Discharge Equipment Recommendations: none   Barriers to discharge: None    Assessment:     Esperanza Aquino is a 58 y.o. female admitted with a medical diagnosis of Acute on chronic respiratory failure with hypoxia. .  At this time, patient is functioning at their prior level of function and does not require further acute PT services.     Recent Surgery: * No surgery found *    Plan:     During this hospitalization, patient does not require further acute PT services.  Please re-consult if situation changes.      Subjective     Chief Complaint: "I HATE BEING IN THE HOSPITAL"  PT TEARFUL THROUGHOUT EVAL  Patient/Family Comments/goals: GO HOME  Pain/Comfort:  · Pain Rating 1: 5/10  · Location 1: back  · Pain Addressed 1: Nurse notified    Patients cultural, spiritual, Mandaen conflicts given the current situation:     Living Environment:  PT LIVES WITH [place] AND OTHER ROOM MATES 1 STORY HOUSE NO STEPS, AMB INDEP COMMUNITY DISTANCES, DOES NOT DRIVE OR WORK, INDEP WITH ADL'S, PT IS O2 DEPENDENT  Prior to admission, patients level of function was INDEP.  Equipment used at home: oxygen.  DME owned (not currently used): none.  Upon discharge, patient will have assistance from ?.    Objective:     Communicated with NURSE MARTI prior to session.  Patient found supine with peripheral IV upon PT entry to room.    General Precautions: Standard  Orthopedic Precautions:N/A   Braces: N/A     Exams:  · Cognitive Exam:  Patient is oriented to Person, Place, Time and Situation  · Postural Exam:  Patient presented with the following abnormalities:    · -       Rounded shoulders  · Sensation:    · -       Intact  · RLE ROM: WFL  · RLE Strength: WFL  · LLE ROM: WFL  · LLE Strength: WFL    Functional Mobility:  · Bed Mobility:     · Rolling Left:  modified " independence  · Scooting: modified independence  · Supine to Sit: modified independence  · Transfers:     · Sit to Stand:  modified independence with no AD  · Bed to Chair: modified independence with  no AD  using  Step Transfer  · Toilet Transfer: modified independence with  no AD  using  Step Transfer  · Gait: PT ' NO AD WITH SPV, NO LOB OR SOB WITH O2 IN TOW  · Balance: GOOD    AM-PAC 6 CLICK MOBILITY  Total Score:21     Therapeutic Activities and Exercises:   PT EDUCATED IN ROLE OF P.T. AND TIANA KAUR WITH SBA    AM-PAC 6 CLICK MOBILITY  Total Score:21     Patient left SEATED AT EOB EATING BREAKFAST with all lines intact, call button in reach and NURSE notified.    GOALS:   Multidisciplinary Problems     Physical Therapy Goals     Not on file                History:     Past Medical History:   Diagnosis Date    Bipolar 1 disorder     not sure what type    Cancer     Uterine Cancer    COPD (chronic obstructive pulmonary disease)     Depression     Encounter for blood transfusion     Hypertension     Hyponatremia 4/3/2019    Personal history of peptic ulcer disease     Pneumonia        Past Surgical History:   Procedure Laterality Date    APPENDECTOMY       SECTION      CHOLECYSTECTOMY      COLONOSCOPY N/A 2015    Performed [facility]es [facility]    ESOPHAGOGASTRODUODENOSCOPY (EGD) N/A 2015    Performed by [facility]rales MD at [facility] ENDO    HERNIA REPAIR      HYSTERECTOMY         Time Tracking:     PT Received On: 19  PT Start Time: 0755     PT Stop Time: 0820  PT Total Time (min): 25 min     Billable Minutes: Evaluation 15 and Gait Training 10    Inocencia Jenkins, PT  2019

## 2019-04-05 NOTE — DISCHARGE INSTRUCTIONS
1. See PCP within 3-7 days. Show them this document. We recommend that they check a chest Xray in 4-6 weeks to ensure bilateral infiltrates previous seen are resolved  Also PCP needs to monitor your Potassium (and Sodium) levels on Hydrochlorothiazide. We will discharge on 10 days worth of low supplementation of oral potassium.  2. Stop smoking  3. Recommend wearing your oxygen continuously  4. Return to ER for complaints including but not limited to high fevers, shortness of breath or chest pain.

## 2019-04-05 NOTE — PLAN OF CARE
Problem: Adult Inpatient Plan of Care  Goal: Plan of Care Review  Outcome: Ongoing (interventions implemented as appropriate)  Remained free from injury. Blood glucose monitored. PRN pain meds managed pain. No s/s of acute distress. 12hr chart check complete.

## 2019-04-05 NOTE — NURSING
"Went to talk to patient about home O2. Patient states that she does have a portable tank, but does not need it to travel to home because she just lives " down the road". Patient became loud when she was speaking and I asked her to not be so loud.   Patient told her  "Go home and get the oxygen so I can go home!"   replied, "I'm not going all the way home just to turn back around! She will be just fine without the oxygen!"  Dr Jack notified of above.  "

## 2019-04-05 NOTE — CONSULTS
Pt's insurance will not cover shower chair. Pt has oxygen through [facility]. No further needs at this time.

## 2019-04-08 LAB
BACTERIA BLD CULT: NORMAL
BACTERIA BLD CULT: NORMAL

## 2019-04-20 ENCOUNTER — NURSE TRIAGE (OUTPATIENT)
Dept: ADMINISTRATIVE | Facility: CLINIC | Age: 59
End: 2019-04-20

## 2019-04-20 NOTE — TELEPHONE ENCOUNTER
Reason for Disposition   General information question, no triage required and triager able to answer question    Protocols used: INFORMATION ONLY CALL-A-AH    Ms. Aquino has an issue with the oxygen system and states it is not functioning properly. She was not given the name of the person/company who came out to deliver it. Look at her discharge summary and social work states [facility] delivered it. Called the [facility]  who referred me to ALTON Montejo on call rep after hours. Gustabo states he will locate her in the records and give her a call. Informed Ms. Reesred to stay by the phone to be available when Mr. Montejo calls and she verbalized understanding.

## 2019-07-21 ENCOUNTER — HOSPITAL ENCOUNTER (EMERGENCY)
Facility: HOSPITAL | Age: 59
Discharge: HOME | End: 2019-07-21
Attending: EMERGENCY MEDICINE
Payer: MEDICAID

## 2019-07-21 VITALS
WEIGHT: 257.94 LBS | SYSTOLIC BLOOD PRESSURE: 176 MMHG | HEIGHT: 65 IN | BODY MASS INDEX: 42.98 KG/M2 | HEART RATE: 78 BPM | OXYGEN SATURATION: 94 % | DIASTOLIC BLOOD PRESSURE: 91 MMHG | RESPIRATION RATE: 16 BRPM | TEMPERATURE: 98 F

## 2019-07-21 DIAGNOSIS — Z99.81 OXYGEN DEPENDENT: ICD-10-CM

## 2019-07-21 DIAGNOSIS — J96.11 CHRONIC RESPIRATORY FAILURE WITH HYPOXIA: ICD-10-CM

## 2019-07-21 DIAGNOSIS — K02.9 DENTAL DECAY: ICD-10-CM

## 2019-07-21 DIAGNOSIS — K02.9 PAIN DUE TO DENTAL CARIES: Primary | ICD-10-CM

## 2019-07-21 PROCEDURE — 99283 EMERGENCY DEPT VISIT LOW MDM: CPT

## 2019-07-21 RX ORDER — CLINDAMYCIN HYDROCHLORIDE 300 MG/1
300 CAPSULE ORAL EVERY 6 HOURS
Qty: 28 CAPSULE | Refills: 0 | Status: SHIPPED | OUTPATIENT
Start: 2019-07-21 | End: 2019-07-28

## 2019-07-21 NOTE — ED PROVIDER NOTES
SCRIBE #1 NOTE: I, Manjula Miller, am scribing for, and in the presence of, Tawana Cobb MD. I have scribed the entire note.       History     Chief Complaint   Patient presents with    Dental Pain     right lower dental pain x 1 week, called her dentist waiting for an appt.     Review of patient's allergies indicates:   Allergen Reactions    Lithobid [lithium carbonate] Other (See Comments)     Severe depression    Sudafed [pseudoephedrine hcl] Other (See Comments)     Pt states she flat lined    Prednisone Other (See Comments)     Elevated BP    Prozac [fluoxetine] Swelling    Thorazine [chlorpromazine] Swelling         History of Present Illness     HPI    7/21/2019, 11:00 AM  History obtained from the patient      History of Present Illness: Esperanza Aquino is a 59 y.o. female patient with a PMHx of HTN, COPD, Bipolar 1 disorder who presents to the Emergency Department for evaluation of R lower dental pain which onset suddenly 1 week ago. The pain has been constant and moderate in severity. She reports pain was unrelieved by Tramadol and is causing her head to hurt. She began taking leftover Keflex prescribed several months ago for another dental issue but is out of the abx. No mitigating or exacerbating factors reported. Pt tried to make a dental appt last week but they have not called her back. Patient denies any associated fever, chills, drooling, SOB, sore throat, facial swelling, N/V, and all other sxs at this time.      Arrival mode: Personal vehicle    PCP: Jaron Damon MD        Past Medical History:  Past Medical History:   Diagnosis Date    Bipolar 1 disorder     not sure what type    Cancer     Uterine Cancer    COPD (chronic obstructive pulmonary disease)     Depression     Encounter for blood transfusion     Hypertension     Hyponatremia 4/3/2019    Personal history of peptic ulcer disease     Pneumonia        Past Surgical History:  Past Surgical History:   Procedure  Laterality Date    APPENDECTOMY       SECTION      CHOLECYSTECTOMY      COLONOSCOPY N/A 2015    [facility]    ESOPHAGOGASTRODUODENOSCOPY (EGD) N/A 2015    [facility]    HERNIA REPAIR      HYSTERECTOMY           Family History:  Family History   Problem Relation Age of Onset    Lung cancer Father     Cancer Father     Brain cancer Brother     Cancer Brother        Social History:  Social History     Tobacco Use    Smoking status: Current Every Day Smoker     Packs/day: 1.00     Years: 25.00     Pack years: 25.00     Types: Cigarettes, Vaping with nicotine     Last attempt to quit: 2015     Years since quittin.5    Smokeless tobacco: Never Used    Tobacco comment: Patient now using E-Cigarrettes   Substance and Sexual Activity    Alcohol use: No    Drug use: No    Sexual activity: Yes     Partners: Male        Review of Systems     Review of Systems   Constitutional: Negative for chills and fever.   HENT: Positive for dental problem (dental pain). Negative for drooling, facial swelling, sore throat, trouble swallowing and voice change.    Respiratory: Negative for cough and shortness of breath.    Cardiovascular: Negative for chest pain.   Gastrointestinal: Negative for abdominal pain, nausea and vomiting.   Genitourinary: Negative for dysuria.   Musculoskeletal: Negative for back pain, neck pain and neck stiffness.   Skin: Negative for rash.   Neurological: Positive for headaches. Negative for weakness.   Hematological: Does not bruise/bleed easily.   All other systems reviewed and are negative.     Physical Exam     Initial Vitals [19 1052]   BP Pulse Resp Temp SpO2   (!) 176/91 78 16 97.8 °F (36.6 °C) (!) 94 %      MAP       --          Physical Exam  Nursing Notes and Vital Signs Reviewed.  Constitutional: Patient is in no acute distress. Well-nourished. Chronically on oxygen.  Head: Atraumatic.  "Normocephalic.  Eyes: PERRL. EOM intact. Conjunctivae are not pale. No scleral icterus.  Mouth/Throat: No evident facial swelling. Patient handles secretions normally. Positive for dental caries. Missing multiple teeth and has generalized teeth decay. No gingival swelling or abscess. No palpable fluctuance. No evidence of periodontal or periapical abscess. No trismus.    Neck: Supple. Full ROM. No lymphadenopathy.  Cardiovascular: Regular rate. Regular rhythm. No murmurs, rubs, or gallops.  Pulmonary/Chest: No respiratory distress. Clear to auscultation bilaterally. No wheezing or rales.  Abdominal: Soft. Non-distended.  Musculoskeletal: Moves all extremities. No obvious deformities.   Skin: Warm and dry.  Neurological:  Alert, awake, and appropriate.  Normal speech.  No acute focal neurological deficits are appreciated.  Psychiatric: Normal affect. Good eye contact. Appropriate in content.     ED Course   Procedures  ED Vital Signs:  Vitals:    07/21/19 1052   BP: (!) 176/91   Pulse: 78   Resp: 16   Temp: 97.8 °F (36.6 °C)   TempSrc: Oral   SpO2: (!) 94%   Weight: 117 kg (257 lb 15 oz)   Height: 5' 5" (1.651 m)            The Emergency Provider reviewed the vital signs and test results, which are outlined above.     ED Discussion     11:12 AM: Discussed pt dx and plan to prescribe Clindamycin and topical Benzocaine to help with pain. Patient informed to f/u with a dentist. All questions and concerns were addressed at this time. Pt expresses understanding of information and instructions, and is comfortable with plan to discharge. Pt is stable for discharge.    I discussed with patient and/or family/caretaker that evaluation in the ED does not suggest any emergent or life threatening medical conditions requiring immediate intervention beyond what was provided in the ED, and I believe patient is safe for discharge.  Regardless, an unremarkable evaluation in the ED does not preclude the development or presence of a " serious of life threatening condition. As such, patient was instructed to return immediately for any worsening or change in current symptoms.      ED Medication(s):  Medications - No data to display    Discharge Medication List as of 7/21/2019 11:12 AM      START taking these medications    Details   benzocaine (BABY ORAJEL) 7.5 % oral gel Use as directed in the mouth or throat 3 (three) times daily as needed for Pain., Starting Sun 7/21/2019, Print      clindamycin (CLEOCIN) 300 MG capsule Take 1 capsule (300 mg total) by mouth every 6 (six) hours. for 7 days, Starting Sun 7/21/2019, Until Sun 7/28/2019, Normal             Follow-up Information     [facility]. Schedule an appointment as soon as possible for a visit in 2 days.    Why:  Return to the Emergency Room, If symptoms worsen  Contact information:  [address] [facility] [place] [address]  [phone]                                     Scribe Attestation:   Scribe #1: I performed the above scribed service and the documentation accurately describes the services I performed. I attest to the accuracy of the note.     Attending:   Physician Attestation Statement for Scribe #1: I, Tawana Cobb MD, personally performed the services described in this documentation, as scribed by Manjula Miller, in my presence, and it is both accurate and complete.           Clinical Impression       ICD-10-CM ICD-9-CM   1. Pain due to dental caries K02.9 521.00   2. Dental decay K02.9 521.00   3. Chronic respiratory failure with hypoxia J96.11 518.83     799.02   4. Oxygen dependent Z99.81 V46.2       Disposition:   Disposition: Discharged  Condition: Stable         Tawana Cobb MD  07/21/19 6632

## 2019-08-08 RX ORDER — FUROSEMIDE 20 MG/1
20 TABLET ORAL 2 TIMES DAILY
Qty: 14 TABLET | Refills: 0 | OUTPATIENT
Start: 2019-08-08

## 2022-09-08 NOTE — ASSESSMENT & PLAN NOTE
- Secondary to PNA + COPD.  - Continue supplemental O2, titrate as needed (on 3L NC as needed at home).  - Continue bronchodilators + ICS.  - Empiric antibiotics.   General Sunscreen Counseling: I recommended a broad spectrum sunscreen with a SPF of 30 or higher.  I explained that SPF 30 sunscreens block approximately 97 percent of the sun's harmful rays.  Sunscreens should be applied at least 15 minutes prior to expected sun exposure and then every 2 hours after that as long as sun exposure continues. If swimming or exercising sunscreen should be reapplied every 45 minutes to an hour after getting wet or sweating.  One ounce, or the equivalent of a shot glass full of sunscreen, is adequate to protect the skin not covered by a bathing suit. I also recommended a lip balm with a sunscreen as well. Sun protective clothing can be used in lieu of sunscreen but must be worn the entire time you are exposed to the sun's rays. Detail Level: Generalized

## 2025-04-23 NOTE — PT/OT/SLP PROGRESS
"Physical Therapy      Patient Name:  Esperanza Aquino   MRN:  [id number]    P.T. 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 Didn't feel too bad after last session.       OBJECTIVE                                                                                                                                         Treatment     Therapeutic Exercise  Bridge x30  Clams 2x10 left   Pilates ring adduction 2x10  Pilates ring abduction 2x10  Straight leg raise 2.5 lbs x10 with PT assistance, 2x10 no weight   Standing 3-way kicks 2x10 bilateral  Standing lower lumbar stretch with plinth 3x10  Single leg balance on left 4x fatigue    Manual Therapy   Hip ranging into flexion, ER, IR x20 each  Hip posterior mobilization in modified hooklying grade II/III 3x10 left     Therapeutic Activity  Not Performed:  Standing hip flexion with UE support 2x20  Single leg balance 4x fatigue bilateral with UE support  Steps 6\" step with UE support 2x10 left   Walking down hallway without AD x2 laps    Skilled input: verbal instruction/cues, tactile instruction/cues and demonstration    Writer verbally educated and received verbal consent for hand placement, positioning of patient, and techniques to be performed today from patient for clothing adjustments for techniques, hand placement and palpation for techniques and therapist position for techniques as described above and how they are pertinent to the patient's plan of care.  Home Exercise Program  Access Code: [email or web address]  URL: [email or web address]  Date: 04/09/2025  Prepared by: Jocelyn Arredondo    Exercises  - Supine Bridge with Resistance Band  - 1 x daily - 7 x weekly - 3 sets - 10 reps  - Hooklying Single Leg Bent Knee  Fallouts with Resistance  - 1 x daily - 7 x weekly - 3 sets - 10 reps  - Straight Leg Raise with External Rotation  - 1 x daily - 7 x weekly - 3 sets - 10 reps      ASSESSMENT                                                                                                            Increased proximal hip loading this date introducing hip extension and abduction motions. Patient challenged with extended weight bearing on left lower extremity fatiguing with repetition. Will continue to progress patient as tolerated.   Education:   - Present and ready to learn: patient  - Results of above outlined education: Verbalizes understanding, Demonstrates understanding and Needs reinforcement    PLAN                                                                                                                           Suggestions for next session as indicated: Progress per plan of care: manual prn, proximal loading, sit to stand, standing balance, ambulation without AD, step ups, introduce ER and extension       Therapy procedure time and total treatment time can be found documented on the Time Entry flowsheet